# Patient Record
Sex: MALE | Race: WHITE | Employment: OTHER | URBAN - NONMETROPOLITAN AREA
[De-identification: names, ages, dates, MRNs, and addresses within clinical notes are randomized per-mention and may not be internally consistent; named-entity substitution may affect disease eponyms.]

---

## 2018-01-08 PROBLEM — E78.5 HYPERLIPIDEMIA: Status: ACTIVE | Noted: 2018-01-08

## 2018-01-19 ENCOUNTER — HOSPITAL ENCOUNTER (OUTPATIENT)
Age: 78
Discharge: HOME | End: 2018-01-19
Payer: MEDICARE

## 2018-01-19 LAB
ALT SERPL-CCNC: 17 U/L (ref 5–41)
AST SERPL-CCNC: 18 U/L
CHOLESTEROL/HDL RATIO: 3.7
CHOLESTEROL: 165 MG/DL
HDLC SERPL-MCNC: 45 MG/DL
LDL CHOLESTEROL: 97 MG/DL (ref 0–130)
PROSTATE SPECIFIC ANTIGEN: 5.97 UG/L
TRIGL SERPL-MCNC: 114 MG/DL
VLDLC SERPL CALC-MCNC: NORMAL MG/DL (ref 1–30)

## 2018-01-19 PROCEDURE — 36415 COLL VENOUS BLD VENIPUNCTURE: CPT

## 2018-01-19 PROCEDURE — G0103 PSA SCREENING: HCPCS

## 2018-01-19 PROCEDURE — 84460 ALANINE AMINO (ALT) (SGPT): CPT

## 2018-01-19 PROCEDURE — 84450 TRANSFERASE (AST) (SGOT): CPT

## 2018-01-19 PROCEDURE — 80061 LIPID PANEL: CPT

## 2018-01-23 ENCOUNTER — HOSPITAL ENCOUNTER (OUTPATIENT)
Dept: NON INVASIVE DIAGNOSTICS | Age: 78
Discharge: HOME | End: 2018-01-23
Payer: MEDICARE

## 2018-01-23 DIAGNOSIS — I49.9 IRREGULAR HEARTBEAT: ICD-10-CM

## 2018-01-23 PROCEDURE — 93225 XTRNL ECG REC<48 HRS REC: CPT

## 2018-02-13 ENCOUNTER — HOSPITAL ENCOUNTER (OUTPATIENT)
Dept: NON INVASIVE DIAGNOSTICS | Age: 78
Discharge: HOME | End: 2018-02-13
Payer: MEDICARE

## 2018-02-13 DIAGNOSIS — R94.31 ABNORMAL HOLTER MONITOR FINDING: ICD-10-CM

## 2018-02-13 DIAGNOSIS — I48.91 ATRIAL FIBRILLATION, UNSPECIFIED TYPE (HCC): ICD-10-CM

## 2018-02-13 LAB
LV EF: 58 %
LVEF MODALITY: NORMAL

## 2018-02-13 PROCEDURE — 3430000000 HC RX DIAGNOSTIC RADIOPHARMACEUTICAL: Performed by: INTERNAL MEDICINE

## 2018-02-13 PROCEDURE — 93017 CV STRESS TEST TRACING ONLY: CPT

## 2018-02-13 PROCEDURE — 78452 HT MUSCLE IMAGE SPECT MULT: CPT

## 2018-02-13 PROCEDURE — 6360000002 HC RX W HCPCS: Performed by: FAMILY MEDICINE

## 2018-02-13 PROCEDURE — A9500 TC99M SESTAMIBI: HCPCS | Performed by: INTERNAL MEDICINE

## 2018-02-13 PROCEDURE — 93306 TTE W/DOPPLER COMPLETE: CPT

## 2018-02-13 RX ADMIN — REGADENOSON 0.4 MG: 0.08 INJECTION, SOLUTION INTRAVENOUS at 09:33

## 2018-02-13 RX ADMIN — Medication 30 MILLICURIE: at 09:39

## 2018-02-14 ENCOUNTER — HOSPITAL ENCOUNTER (OUTPATIENT)
Dept: NON INVASIVE DIAGNOSTICS | Age: 78
Discharge: HOME | End: 2018-02-14
Payer: MEDICARE

## 2018-02-14 PROCEDURE — A9500 TC99M SESTAMIBI: HCPCS | Performed by: INTERNAL MEDICINE

## 2018-02-14 PROCEDURE — 3430000000 HC RX DIAGNOSTIC RADIOPHARMACEUTICAL: Performed by: INTERNAL MEDICINE

## 2018-02-14 RX ADMIN — Medication 30 MILLICURIE: at 12:24

## 2018-02-16 NOTE — PROCEDURES
[address] [address]                                CARDIAC STRESS TEST    PATIENT NAME: Rhina Perry                   :        1940  MED REC NO:   [id number]                              ROOM:  ACCOUNT NO:   [de-identified]                           ADMIT DATE: 2018  PROVIDER:     Fallon Ferguson    CARDIOVASCULAR DIAGNOSTIC DEPARTMENT    DATE OF STUDY:  2018    ORDERING PROVIDER:  Jaiden Luevano MD    PRIMARY CARE PROVIDER:  Jaiden Luevano MD    INTERPRETING PHYSICIAN:  Fallon Ferguson MD    PHARMACOLOGIC MYOCARDIAL PERFUSION STRESS TESTING    Stress/rest single isotope SPECT imaging with exercise stress and gated  SPECT imaging. INDICATIONS:  Assessment of a cardiac cause of:  Abnormal ECG    CLINICAL HISTORY:  The patient is a 68-year-old man with no known coronary  artery disease. Previous cardiac history includes:  None    Other previous history includes:  Chest pain    Symptoms just prior to testing include:  None    Relevant medications:  None    PROCEDURE:  The heart rate was 82 at baseline and tom to 117 beats per  minute during the regadenoson infusion. The rest blood pressure was 130/80  mm/Hg and increased to 144/80 mm/Hg. The patient did not complain of any  significant symptoms following infusion. Pharmacologic stress testing was performed with regadenoson at a dose of  0.4 mg. Additionally, low level exercise using slow treadmill walking was  performed along with vasodilator infusion. MYOCARDIAL PERFUSION IMAGING:  Imaging was performed at rest 30-45 minutes  following the injection of 30.0 mCi of sestamibi. Approximately 10 seconds  after Lexiscan injection, the patient was injected with 30.0 mCi of  sestamibi. Gating post-stress tomographic imaging was performed 30-45  minutes after stress.     STRESS ECG RESULTS:  The resting electrocardiogram demonstrated normal  sinus rhythm without significant ST-segment abnormalities that may impair  accurate ECG detection of stress induced cardiac ischemia. During vasodilator infusion and during recovery, the patient developed:    No significant ST segment changes suggestive of myocardial ischemia with  occasional premature atrial contractions (PACs) and occasional premature  ventricular contractions (PVCs). NUCLEAR IMAGING RESULTS:  The overall quality of the study is good. Moderate attenuation artifact was seen. There is no evidence of abnormal  lung uptake. Additionally, the right ventricle appears normal.  The left  ventricular cavity is noted to be normal in size on the stress images. There is no evidence of transient ischemic dilatation (TID) of the left  ventricle. Gated SPECT imaging reveals normal myocardial thickening and wall motion  with a calculated left ventricular ejection fraction of 58%. The rest images demonstrated a small perfusion abnormality of mild  intensity in the inferolateral region(s) which is most likely due to  artifact. On stress imaging, a small/moderate perfusion abnormality of mild/moderate  intensity in the inferolateral and inferior region(s) which may be due to  artifact. IMPRESSION:  1. Equivocal myocardial perfusion study. There is a small/moderate  perfusion defect of mild/moderate intensity in the inferolateral and  inferior region(s) during stress imaging which is most consistent with  ischemia but may be due to artifact. 2.  Global left ventricular systolic function was normal without regional  wall motion abnormalities. 3.  No significant electrocardiographic evidence of myocardial ischemia  during EKG monitoring without significant associated arrhythmias. Overall, these results are most consistent with a low risk for significant  coronary artery disease.     Depending on the patient symptoms and level of clinical suspicion,  aggressive medical management vs. additional testing by coronary  angiography may be indicated. Efraín Chaidez    D: 02/16/2018 5:51:48       T: 02/16/2018 5:54:27     [email or web address]  Job#: [id number]    Doc#: Unknown    CC:  [employer]. Para Goldberg

## 2018-02-28 ENCOUNTER — OFFICE VISIT (OUTPATIENT)
Dept: CARDIOLOGY | Age: 78
End: 2018-02-28
Payer: MEDICARE

## 2018-02-28 VITALS
HEIGHT: 74 IN | RESPIRATION RATE: 16 BRPM | WEIGHT: 207.2 LBS | BODY MASS INDEX: 26.59 KG/M2 | SYSTOLIC BLOOD PRESSURE: 135 MMHG | OXYGEN SATURATION: 95 % | DIASTOLIC BLOOD PRESSURE: 82 MMHG | HEART RATE: 60 BPM

## 2018-02-28 DIAGNOSIS — I49.3 FREQUENT PVCS: ICD-10-CM

## 2018-02-28 DIAGNOSIS — E78.2 MIXED HYPERLIPIDEMIA: ICD-10-CM

## 2018-02-28 DIAGNOSIS — I48.0 PAF (PAROXYSMAL ATRIAL FIBRILLATION) (HCC): Primary | ICD-10-CM

## 2018-02-28 DIAGNOSIS — R94.39 ABNORMAL STRESS TEST: ICD-10-CM

## 2018-02-28 PROCEDURE — 1036F TOBACCO NON-USER: CPT | Performed by: FAMILY MEDICINE

## 2018-02-28 PROCEDURE — 1123F ACP DISCUSS/DSCN MKR DOCD: CPT | Performed by: FAMILY MEDICINE

## 2018-02-28 PROCEDURE — G8484 FLU IMMUNIZE NO ADMIN: HCPCS | Performed by: FAMILY MEDICINE

## 2018-02-28 PROCEDURE — G8419 CALC BMI OUT NRM PARAM NOF/U: HCPCS | Performed by: FAMILY MEDICINE

## 2018-02-28 PROCEDURE — G8427 DOCREV CUR MEDS BY ELIG CLIN: HCPCS | Performed by: FAMILY MEDICINE

## 2018-02-28 PROCEDURE — 99215 OFFICE O/P EST HI 40 MIN: CPT | Performed by: FAMILY MEDICINE

## 2018-02-28 PROCEDURE — 4040F PNEUMOC VAC/ADMIN/RCVD: CPT | Performed by: FAMILY MEDICINE

## 2018-02-28 RX ORDER — B-COMPLEX WITH VITAMIN C
TABLET ORAL PRN
COMMUNITY
End: 2020-11-19

## 2018-02-28 NOTE — PROGRESS NOTES
inferolateral and inferior regions during stress imaging which is most consistent with ischemia but may be due to artifact. Overall, thses results are most consistent with a low risk for CAD    H/O echocardiogram 02/13/2018    EF 55-60%. The left ventiruclar wall thickness is mildly incrased. Aortic valve calcification without significant stenosis or regurgirtation. Evidence of mild (grade I) diastolic dysfucntion is seen.  History of Holter monitoring 01/29/2018    Atrial fibrillation for 51% of the test duration with heart rates ranging from 44 to 127 bpm with an average HR of 65 bpm. No symptoms were reported.  Hyperlipidemia     Other and unspecified hyperlipidemia     Psychosexual dysfunction with inhibited sexual excitement     Unspecified hyperplasia of prostate without urinary obstruction and other lower urinary tract symptoms (LUTS)        CURRENT ALLERGIES: Patient has no known allergies. REVIEW OF SYSTEMS: 14 systems were reviewed. Pertinent positives and negatives as above, all else negative. Past Surgical History:   Procedure Laterality Date    CYSTOURETHROSCOPY  1/12/05    HERNIA REPAIR      inguinal    PROSTATE SURGERY  1/5/11,1/12/05    TRUS-P    Social History:  Social History   Substance Use Topics    Smoking status: Former Smoker     Packs/day: 2.00     Years: 20.00     Quit date: 1/3/1977    Smokeless tobacco: Never Used    Alcohol use Yes        CURRENT MEDICATIONS:  No outpatient prescriptions have been marked as taking for the 2/28/18 encounter (Office Visit) with Keyla Acuna MD.       FAMILY HISTORY: family history includes Breast Cancer in his daughter and sister; Cancer in his son; Diabetes in his father; Heart Disease in his mother; Liver Cancer in his father.      PHYSICAL EXAM:   /82 (Site: Left Arm, Position: Sitting, Cuff Size: Medium Adult)   Pulse 60   Resp 16   Ht 6' 2\" (1.88 m)   Wt 207 lb 3.2 oz (94 kg)   SpO2 95%   BMI 26.60 kg/m²  Body mass index is 26.6 kg/m². Constitutional: He is oriented to person, place, and time. He appears well-developed and well-nourished. In no acute distress. HEENT: Normocephalic and atraumatic. No JVD present. Carotid bruit is not present. No mass and no thyromegaly present. No lymphadenopathy present. Cardiovascular: Normal rate, regular rhythm, normal heart sounds. Exam reveals no gallop and no friction rubs. A I/VI systolic murmur at the 2nd right intercostal space  Pulmonary/Chest: Effort normal and breath sounds normal. No respiratory distress. He has no wheezes, rhonchi or rales. Abdominal: Soft, non-tender. Bowel sounds and aorta are normal. He exhibits no organomegaly, mass or bruit. Extremities: No edema. No cyanosis and no clubbing. Pulses are 2+ radial and carotid pulses. 2+ dorsalis pedis and posterior tibial pulses bilaterally. Neurological: He is alert and oriented to person. No evidence of gross cranial nerve deficit. Coordination appeared normal.   Skin: Skin is warm and dry. There is no rash or diaphoresis. Psychiatric: He has a normal mood and affect. His speech is normal and behavior is normal.      MOST RECENT LABS ON RECORD:   Lab Results   Component Value Date    WBC 7.34 06/10/2016    HGB 14.1 06/10/2016    HCT 42.5 06/10/2016     06/10/2016    CHOL 165 01/19/2018    TRIG 114 01/19/2018    HDL 45 01/19/2018    ALT 17 01/19/2018    AST 18 01/19/2018     06/10/2016    K 4.5 06/10/2016     06/10/2016    CREATININE 1.00 06/10/2016    BUN 11 06/10/2016    CO2 27 06/10/2016    PSA 5.97 (H) 01/19/2018    LABA1C 5.7 06/10/2016       ASSESSMENT:  1. PAF (paroxysmal atrial fibrillation) ([facility] [address]    2. Abnormal stress test    3. Mixed hyperlipidemia    4.  Frequent PVCs       PLAN:  New Onset Paroxymal Atrial Fibrillation: Rate Control-Holter monitor: 51% of atrial fibrillation on 1/29/2018  Beta Blocker: Not indicated due to being asymptomatic with rate controlled at the current

## 2018-08-29 ENCOUNTER — OFFICE VISIT (OUTPATIENT)
Dept: CARDIOLOGY | Age: 78
End: 2018-08-29
Payer: MEDICARE

## 2018-08-29 VITALS
WEIGHT: 205.6 LBS | HEART RATE: 94 BPM | BODY MASS INDEX: 26.39 KG/M2 | HEIGHT: 74 IN | OXYGEN SATURATION: 95 % | SYSTOLIC BLOOD PRESSURE: 119 MMHG | DIASTOLIC BLOOD PRESSURE: 77 MMHG | RESPIRATION RATE: 16 BRPM

## 2018-08-29 DIAGNOSIS — E78.2 MIXED HYPERLIPIDEMIA: ICD-10-CM

## 2018-08-29 DIAGNOSIS — I49.3 FREQUENT PVCS: ICD-10-CM

## 2018-08-29 DIAGNOSIS — I48.0 PAF (PAROXYSMAL ATRIAL FIBRILLATION) (HCC): Primary | ICD-10-CM

## 2018-08-29 PROCEDURE — 1123F ACP DISCUSS/DSCN MKR DOCD: CPT | Performed by: FAMILY MEDICINE

## 2018-08-29 PROCEDURE — 93000 ELECTROCARDIOGRAM COMPLETE: CPT | Performed by: FAMILY MEDICINE

## 2018-08-29 PROCEDURE — G8427 DOCREV CUR MEDS BY ELIG CLIN: HCPCS | Performed by: FAMILY MEDICINE

## 2018-08-29 PROCEDURE — G8419 CALC BMI OUT NRM PARAM NOF/U: HCPCS | Performed by: FAMILY MEDICINE

## 2018-08-29 PROCEDURE — 1101F PT FALLS ASSESS-DOCD LE1/YR: CPT | Performed by: FAMILY MEDICINE

## 2018-08-29 PROCEDURE — 1036F TOBACCO NON-USER: CPT | Performed by: FAMILY MEDICINE

## 2018-08-29 PROCEDURE — 99213 OFFICE O/P EST LOW 20 MIN: CPT | Performed by: FAMILY MEDICINE

## 2018-08-29 PROCEDURE — 4040F PNEUMOC VAC/ADMIN/RCVD: CPT | Performed by: FAMILY MEDICINE

## 2018-08-29 RX ORDER — LOVASTATIN 40 MG/1
40 TABLET ORAL NIGHTLY
Qty: 90 TABLET | Refills: 3 | Status: SHIPPED | OUTPATIENT
Start: 2018-08-29 | End: 2019-03-22

## 2018-08-29 NOTE — PROGRESS NOTES
of the test duration with heart rates ranging from 44 to 127 bpm with an average HR of 65 bpm. No symptoms were reported.  Hyperlipidemia     Other and unspecified hyperlipidemia     Psychosexual dysfunction with inhibited sexual excitement     Unspecified hyperplasia of prostate without urinary obstruction and other lower urinary tract symptoms (LUTS)        CURRENT ALLERGIES: Patient has no known allergies. REVIEW OF SYSTEMS: 14 systems were reviewed. Pertinent positives and negatives as above, all else negative. Past Surgical History:   Procedure Laterality Date    CYSTOURETHROSCOPY  1/12/05    HERNIA REPAIR      inguinal    PROSTATE SURGERY  1/5/11,1/12/05    TRUS-P    Social History:  Social History   Substance Use Topics    Smoking status: Former Smoker     Packs/day: 2.00     Years: 20.00     Quit date: 1/3/1977    Smokeless tobacco: Never Used    Alcohol use Yes        CURRENT MEDICATIONS:  Outpatient Prescriptions Marked as Taking for the 8/29/18 encounter (Office Visit) with Jah Dumont MD   Medication Sig Dispense Refill    apixaban (ELIQUIS) 5 MG TABS tablet Take 1 tablet by mouth 2 times daily 180 tablet 3    Multiple Vitamins-Minerals (ICAPS AREDS 2 PO) Take by mouth      B Complex Vitamins (VITAMIN B COMPLEX) TABS Take by mouth as needed      finasteride (PROSCAR) 5 MG tablet Take 5 mg by mouth daily.  tamsulosin (FLOMAX) 0.4 MG capsule Take 0.4 mg by mouth daily. FAMILY HISTORY: family history includes Breast Cancer in his daughter and sister; Cancer in his son; Diabetes in his father; Heart Disease in his mother; Liver Cancer in his father. PHYSICAL EXAM:   /77 (Site: Right Arm, Position: Sitting, Cuff Size: Medium Adult)   Pulse 94   Resp 16   Ht 6' 2\" (1.88 m)   Wt 205 lb 9.6 oz (93.3 kg)   SpO2 95%   BMI 26.40 kg/m²  Body mass index is 26.4 kg/m². Constitutional: He is oriented to person, place, and time.  He appears well-developed and well-nourished. In no acute distress. HEENT: Normocephalic and atraumatic. No JVD present. Carotid bruit is not present. No mass and no thyromegaly present. No lymphadenopathy present. Cardiovascular: Normal rate, irregularly irregular rhythm, normal heart sounds. Exam reveals no gallop and no friction rubs. A I/VI systolic murmur at the 2nd right intercostal space  Pulmonary/Chest: Effort normal and breath sounds normal. No respiratory distress. He has no wheezes, rhonchi or rales. Abdominal: Soft, non-tender. Bowel sounds and aorta are normal. He exhibits no organomegaly, mass or bruit. Extremities: No edema. No cyanosis and no clubbing. Pulses are 2+ radial and carotid pulses. 2+ dorsalis pedis and posterior tibial pulses bilaterally. Neurological: He is alert and oriented to person. No evidence of gross cranial nerve deficit. Coordination appeared normal.   Skin: Skin is warm and dry. There is no rash or diaphoresis. Psychiatric: He has a normal mood and affect. His speech is normal and behavior is normal.      MOST RECENT LABS ON RECORD:   Lab Results   Component Value Date    WBC 8.32 05/14/2018    HGB 14.4 05/14/2018    HCT 43.3 05/14/2018     05/14/2018    CHOL 165 01/19/2018    TRIG 114 01/19/2018    HDL 55 05/14/2018    ALT 17 05/14/2018    ALT 17 05/14/2018    AST 22 05/14/2018    AST 22 05/14/2018    AST 22 05/14/2018     05/14/2018    K 4.4 05/14/2018     05/14/2018    CREATININE 1.00 05/14/2018    BUN 15 05/14/2018    CO2 26 05/14/2018    PSA 5.97 (H) 01/19/2018    GLUF 98 05/14/2018    LABA1C 5.7 06/10/2016       ASSESSMENT:  1. PAF (paroxysmal atrial fibrillation) ([facility] [address]    2. Frequent PVCs    3. Mixed hyperlipidemia       PLAN:  Paroxymal Atrial Fibrillation: Rate Control-Holter monitor: 51% of atrial fibrillation on 1/29/2018. Currently appears completely asymptomatic. Beta Blocker: Not indicated due to being asymptomatic with rate controlled at the current time.   Stroke

## 2018-09-21 PROBLEM — I48.0 PAROXYSMAL ATRIAL FIBRILLATION (HCC): Status: ACTIVE | Noted: 2018-09-21

## 2018-11-05 ENCOUNTER — HOSPITAL ENCOUNTER (OUTPATIENT)
Age: 78
Discharge: HOME | End: 2018-11-05
Payer: MEDICARE

## 2018-11-05 DIAGNOSIS — E78.2 MIXED HYPERLIPIDEMIA: ICD-10-CM

## 2018-11-05 DIAGNOSIS — E78.5 HYPERLIPIDEMIA, UNSPECIFIED HYPERLIPIDEMIA TYPE: ICD-10-CM

## 2018-11-05 DIAGNOSIS — Z12.5 SCREENING PSA (PROSTATE SPECIFIC ANTIGEN): ICD-10-CM

## 2018-11-05 LAB
ALT SERPL-CCNC: 17 U/L (ref 5–41)
CHOLESTEROL, FASTING: 186 MG/DL
CHOLESTEROL/HDL RATIO: 3.7
HDLC SERPL-MCNC: 50 MG/DL
LDL CHOLESTEROL: 117 MG/DL (ref 0–130)
PROSTATE SPECIFIC ANTIGEN: 4.99 UG/L
TRIGLYCERIDE, FASTING: 94 MG/DL
VLDLC SERPL CALC-MCNC: NORMAL MG/DL (ref 1–30)

## 2018-11-05 PROCEDURE — 36415 COLL VENOUS BLD VENIPUNCTURE: CPT

## 2018-11-05 PROCEDURE — 80061 LIPID PANEL: CPT

## 2018-11-05 PROCEDURE — G0103 PSA SCREENING: HCPCS

## 2018-11-05 PROCEDURE — 84460 ALANINE AMINO (ALT) (SGPT): CPT

## 2019-02-01 ENCOUNTER — HOSPITAL ENCOUNTER (OUTPATIENT)
Age: 79
Discharge: HOME | End: 2019-02-01
Payer: MEDICARE

## 2019-02-01 LAB
-: ABNORMAL
AMORPHOUS: ABNORMAL
BACTERIA: ABNORMAL
BILIRUBIN URINE: NEGATIVE
CASTS UA: ABNORMAL /LPF
COLOR: YELLOW
COMMENT UA: NORMAL
CRYSTALS, UA: ABNORMAL /HPF
EPITHELIAL CELLS UA: ABNORMAL /HPF (ref 0–5)
GLUCOSE URINE: NEGATIVE
KETONES, URINE: NEGATIVE
LEUKOCYTE ESTERASE, URINE: NEGATIVE
MUCUS: ABNORMAL
NITRITE, URINE: NEGATIVE
OTHER OBSERVATIONS UA: ABNORMAL
PH UA: 6.5 (ref 5–9)
PROSTATE SPECIFIC ANTIGEN: 5.28 UG/L
PROTEIN UA: NEGATIVE
RBC UA: ABNORMAL /HPF (ref 0–2)
RENAL EPITHELIAL, UA: ABNORMAL /HPF
SPECIFIC GRAVITY UA: 1.02 (ref 1.01–1.02)
TRICHOMONAS: ABNORMAL
TURBIDITY: CLEAR
URINE HGB: NEGATIVE
UROBILINOGEN, URINE: NORMAL
WBC UA: ABNORMAL /HPF (ref 0–5)
YEAST: ABNORMAL

## 2019-02-01 PROCEDURE — 81001 URINALYSIS AUTO W/SCOPE: CPT

## 2019-02-01 PROCEDURE — 84153 ASSAY OF PSA TOTAL: CPT

## 2019-02-01 PROCEDURE — 36415 COLL VENOUS BLD VENIPUNCTURE: CPT

## 2019-09-04 ENCOUNTER — OFFICE VISIT (OUTPATIENT)
Dept: CARDIOLOGY | Age: 79
End: 2019-09-04
Payer: MEDICARE

## 2019-09-04 VITALS
RESPIRATION RATE: 18 BRPM | DIASTOLIC BLOOD PRESSURE: 70 MMHG | HEIGHT: 74 IN | SYSTOLIC BLOOD PRESSURE: 122 MMHG | WEIGHT: 204 LBS | OXYGEN SATURATION: 94 % | BODY MASS INDEX: 26.18 KG/M2 | HEART RATE: 64 BPM

## 2019-09-04 DIAGNOSIS — E78.2 MIXED HYPERLIPIDEMIA: ICD-10-CM

## 2019-09-04 DIAGNOSIS — I48.0 PAROXYSMAL ATRIAL FIBRILLATION (HCC): Primary | ICD-10-CM

## 2019-09-04 PROCEDURE — 1123F ACP DISCUSS/DSCN MKR DOCD: CPT | Performed by: FAMILY MEDICINE

## 2019-09-04 PROCEDURE — G8427 DOCREV CUR MEDS BY ELIG CLIN: HCPCS | Performed by: FAMILY MEDICINE

## 2019-09-04 PROCEDURE — 1036F TOBACCO NON-USER: CPT | Performed by: FAMILY MEDICINE

## 2019-09-04 PROCEDURE — 93000 ELECTROCARDIOGRAM COMPLETE: CPT | Performed by: FAMILY MEDICINE

## 2019-09-04 PROCEDURE — 4040F PNEUMOC VAC/ADMIN/RCVD: CPT | Performed by: FAMILY MEDICINE

## 2019-09-04 PROCEDURE — G8419 CALC BMI OUT NRM PARAM NOF/U: HCPCS | Performed by: FAMILY MEDICINE

## 2019-09-04 PROCEDURE — 99213 OFFICE O/P EST LOW 20 MIN: CPT | Performed by: FAMILY MEDICINE

## 2019-09-04 NOTE — PROGRESS NOTES
Equivocal myocardial perfusion study. There is a small/moderate perfusion defect of mild/moderate intensity in the inferolateral and inferior regions during stress imaging which is most consistent with ischemia but may be due to artifact. Overall, thses results are most consistent with a low risk for CAD    H/O echocardiogram 2018    EF 55-60%. The left ventiruclar wall thickness is mildly incrased. Aortic valve calcification without significant stenosis or regurgirtation. Evidence of mild (grade I) diastolic dysfucntion is seen.  History of Holter monitoring 2018    Atrial fibrillation for 51% of the test duration with heart rates ranging from 44 to 127 bpm with an average HR of 65 bpm. No symptoms were reported.  Hyperlipidemia     Other and unspecified hyperlipidemia     Psychosexual dysfunction with inhibited sexual excitement     Unspecified hyperplasia of prostate without urinary obstruction and other lower urinary tract symptoms (LUTS)        CURRENT ALLERGIES: Patient has no known allergies. REVIEW OF SYSTEMS: 14 systems were reviewed. Pertinent positives and negatives as above, all else negative. Past Surgical History:   Procedure Laterality Date    CYSTOURETHROSCOPY  05    HERNIA REPAIR      inguinal    PROSTATE SURGERY  11,05    TRUS-P    Social History:  Social History     Tobacco Use    Smoking status: Former Smoker     Packs/day: 2.00     Years: 20.00     Pack years: 40.00     Last attempt to quit: 1/3/1977     Years since quittin.6    Smokeless tobacco: Never Used   Substance Use Topics    Alcohol use:  Yes    Drug use: Not on file        CURRENT MEDICATIONS:  Outpatient Medications Marked as Taking for the 19 encounter (Office Visit) with Arjun Hurtado MD   Medication Sig Dispense Refill    simvastatin (ZOCOR) 20 MG tablet Take 1 tablet by mouth every evening 90 tablet 3    finasteride (PROSCAR) 5 MG tablet Take 5 mg by mouth daily     

## 2020-01-23 ENCOUNTER — TELEPHONE (OUTPATIENT)
Dept: CARDIOLOGY | Age: 80
End: 2020-01-23

## 2020-10-07 ENCOUNTER — OFFICE VISIT (OUTPATIENT)
Dept: CARDIOLOGY | Age: 80
End: 2020-10-07
Payer: MEDICARE

## 2020-10-07 ENCOUNTER — HOSPITAL ENCOUNTER (OUTPATIENT)
Age: 80
Discharge: HOME | End: 2020-10-07
Payer: MEDICARE

## 2020-10-07 ENCOUNTER — HOSPITAL ENCOUNTER (OUTPATIENT)
Dept: NON INVASIVE DIAGNOSTICS | Age: 80
Discharge: HOME | End: 2020-10-07
Payer: MEDICARE

## 2020-10-07 VITALS
OXYGEN SATURATION: 96 % | DIASTOLIC BLOOD PRESSURE: 79 MMHG | HEIGHT: 74 IN | SYSTOLIC BLOOD PRESSURE: 134 MMHG | HEART RATE: 54 BPM | BODY MASS INDEX: 27.34 KG/M2 | RESPIRATION RATE: 18 BRPM | WEIGHT: 213 LBS

## 2020-10-07 PROBLEM — R00.1 SEVERE SINUS BRADYCARDIA: Status: ACTIVE | Noted: 2020-10-07

## 2020-10-07 LAB
HCT VFR BLD CALC: 43.8 % (ref 40.7–50.3)
HEMOGLOBIN: 14.1 G/DL (ref 13–17)
MCH RBC QN AUTO: 31.3 PG (ref 25.2–33.5)
MCHC RBC AUTO-ENTMCNC: 32.2 G/DL (ref 28.4–34.8)
MCV RBC AUTO: 97.3 FL (ref 82.6–102.9)
NRBC AUTOMATED: 0 PER 100 WBC
PDW BLD-RTO: 13.1 % (ref 11.8–14.4)
PLATELET # BLD: 263 K/UL (ref 138–453)
PMV BLD AUTO: 8.4 FL (ref 8.1–13.5)
RBC # BLD: 4.5 M/UL (ref 4.21–5.77)
WBC # BLD: 7.8 K/UL (ref 3.5–11.3)

## 2020-10-07 PROCEDURE — G8417 CALC BMI ABV UP PARAM F/U: HCPCS | Performed by: FAMILY MEDICINE

## 2020-10-07 PROCEDURE — G8484 FLU IMMUNIZE NO ADMIN: HCPCS | Performed by: FAMILY MEDICINE

## 2020-10-07 PROCEDURE — 93226 XTRNL ECG REC<48 HR SCAN A/R: CPT

## 2020-10-07 PROCEDURE — G8427 DOCREV CUR MEDS BY ELIG CLIN: HCPCS | Performed by: FAMILY MEDICINE

## 2020-10-07 PROCEDURE — 93225 XTRNL ECG REC<48 HRS REC: CPT

## 2020-10-07 PROCEDURE — 85027 COMPLETE CBC AUTOMATED: CPT

## 2020-10-07 PROCEDURE — 1123F ACP DISCUSS/DSCN MKR DOCD: CPT | Performed by: FAMILY MEDICINE

## 2020-10-07 PROCEDURE — 93010 ELECTROCARDIOGRAM REPORT: CPT | Performed by: FAMILY MEDICINE

## 2020-10-07 PROCEDURE — 1036F TOBACCO NON-USER: CPT | Performed by: FAMILY MEDICINE

## 2020-10-07 PROCEDURE — 99211 OFF/OP EST MAY X REQ PHY/QHP: CPT | Performed by: FAMILY MEDICINE

## 2020-10-07 PROCEDURE — 99214 OFFICE O/P EST MOD 30 MIN: CPT | Performed by: FAMILY MEDICINE

## 2020-10-07 PROCEDURE — 93005 ELECTROCARDIOGRAM TRACING: CPT | Performed by: FAMILY MEDICINE

## 2020-10-07 PROCEDURE — 36415 COLL VENOUS BLD VENIPUNCTURE: CPT

## 2020-10-07 PROCEDURE — 4040F PNEUMOC VAC/ADMIN/RCVD: CPT | Performed by: FAMILY MEDICINE

## 2020-10-07 NOTE — PATIENT INSTRUCTIONS
SURVEY:    You may be receiving a survey from [employer] regarding your visit today. Please complete the survey to enable us to provide the highest quality of care to you and your family. If you cannot score us a very good on any question, please call the office to discuss how we could have made your experience a very good one. Thank you. Eron Mckeon was your MA today!

## 2020-10-07 NOTE — PROGRESS NOTES
Jose Maria Espinoza am scribing for and in the presence of Samir Kingsley. Citlalli DONIS, MS, F.A.C.C. Patient: Judy Swift  : 1940  Date of Visit: 2020    REASON FOR VISIT / CONSULTATION: Follow-up (Hx:PAF,HLD. Pt states he is doing just fine. C/o: Palpitaitos on occashion. Denies: CP, Lighteaded/dizziness, SOB.)      Dear Barry Bales MD,    I had the pleasure of seeing Judy Swift today. Mr. Jayla Crowe is a 78 y.o. male with a new onset of atrial fibrillation that had shown on a Holter monitor of 51% of atrial fibrillation. He also had an echocardiogram that had shown an ejection fraction of 55% and a cardiovascular stress test that had shown small-moderate perfusion defect of mild/moderate intensity in the inferolateral and inferior region during stress imaging which is most consistent with ischemia. However, because of his lack of symptoms we decided to opt for a guideline maximal medical management approach. Mr. Jayla Crowe says he is doing well. He just came back from [place] and has a great time down there. He does get some mild lightheaded and dizziness with standing up to quickly however no falls or near falls. He denied any current or recent chest pain, abdominal pain, bleeding problems, problems with his medications or any other concerns at this time. Exercise Tolerance: Mr. Jayla Crowe reports that he has an excellent exercise tolerance. His says that he could thinks he could walk 4-5 miles without developing chest discomfort or significant shortness of breath. He reports occasional lightheadedness and dizziness but mostly just if he gets up to quick and denies any falls or near falls. Bleeding Risks: Mr. Jayla Crowe denies any current or recent bleeding problems including a history of a GI bleed, ulcers, recent or upcoming surgeries, blood in his stool or black tarry stools or blood in his urine.     Past Medical History:   Diagnosis Date    Enlarged prostate     H/O cardiovascular stress test 2018    Equivocal myocardial perfusion study. There is a small/moderate perfusion defect of mild/moderate intensity in the inferolateral and inferior regions during stress imaging which is most consistent with ischemia but may be due to artifact. Overall, thses results are most consistent with a low risk for CAD    H/O echocardiogram 2018    EF 55-60%. The left ventiruclar wall thickness is mildly incrased. Aortic valve calcification without significant stenosis or regurgirtation. Evidence of mild (grade I) diastolic dysfucntion is seen.  History of Holter monitoring 2018    Atrial fibrillation for 51% of the test duration with heart rates ranging from 44 to 127 bpm with an average HR of 65 bpm. No symptoms were reported.  Hyperlipidemia     Other and unspecified hyperlipidemia     Psychosexual dysfunction with inhibited sexual excitement     Unspecified hyperplasia of prostate without urinary obstruction and other lower urinary tract symptoms (LUTS)        CURRENT ALLERGIES: Patient has no known allergies. REVIEW OF SYSTEMS: 14 systems were reviewed. Pertinent positives and negatives as above, all else negative. Past Surgical History:   Procedure Laterality Date    CYSTOURETHROSCOPY  05    HERNIA REPAIR      inguinal    PROSTATE SURGERY  11,05    TRUS-P    Social History:  Social History     Tobacco Use    Smoking status: Former Smoker     Packs/day: 2.00     Years: 20.00     Pack years: 40.00     Last attempt to quit: 1/3/1977     Years since quittin.7    Smokeless tobacco: Never Used   Substance Use Topics    Alcohol use:  Yes    Drug use: Not on file        CURRENT MEDICATIONS:  Outpatient Medications Marked as Taking for the 10/7/20 encounter (Office Visit) with Mona Griffith MD   Medication Sig Dispense Refill    simvastatin (ZOCOR) 20 MG tablet Take 1 tablet by mouth every evening 90 tablet 3    apixaban (ELIQUIS) 5 MG TABS tablet Take 1 tablet by mouth 2 times daily 180 tablet 3    finasteride (PROSCAR) 5 MG tablet Take 5 mg by mouth daily      Multiple Vitamins-Minerals (ICAPS AREDS 2 PO) Take by mouth      B Complex Vitamins (VITAMIN B COMPLEX) TABS Take by mouth as needed      tamsulosin (FLOMAX) 0.4 MG capsule Take 0.4 mg by mouth daily. FAMILY HISTORY: family history includes Breast Cancer in his daughter and sister; Cancer in his son; Diabetes in his father; Heart Disease in his mother; Liver Cancer in his father. PHYSICAL EXAM:   BP (!) 144/74 (Site: Right Upper Arm, Position: Sitting, Cuff Size: Medium Adult)   Pulse 57   Resp 18   Ht 6' 2\" (1.88 m)   Wt 213 lb (96.6 kg)   SpO2 96%   BMI 27.35 kg/m²  Body mass index is 27.35 kg/m². Constitutional: He is oriented to person, place, and time. He appears well-developed and well-nourished. In no acute distress. HEENT: Normocephalic and atraumatic. No JVD present. Carotid bruit is not present. No mass and no thyromegaly present. No lymphadenopathy present. Cardiovascular: Normal rate, regular rhythm, normal heart sounds. Exam reveals no gallop and no friction rubs. A II/VI systolic murmur at the myocardial apex  Pulmonary/Chest: Effort normal and breath sounds normal. No respiratory distress. He has no wheezes, rhonchi or rales. Abdominal: Soft, non-tender. Bowel sounds and aorta are normal. He exhibits no organomegaly, mass or bruit. Extremities: No edema. No cyanosis and no clubbing. Pulses are 2+ radial and carotid pulses. 2+ dorsalis pedis and posterior tibial pulses bilaterally. Neurological: He is alert and oriented to person. No evidence of gross cranial nerve deficit. Coordination appeared normal.   Skin: Skin is warm and dry. There is no rash or diaphoresis. Psychiatric: He has a normal mood and affect.  His speech is normal and behavior is normal.      MOST RECENT LABS ON RECORD:   Lab Results   Component Value Date    WBC 8.16 05/15/2019    HGB 14.3 05/15/2019    HCT 44.7 05/15/2019     05/15/2019    CHOL 165 01/19/2018    TRIG 114 01/19/2018    HDL 54 05/15/2019    ALT 18 05/15/2019    ALT 18 05/15/2019    AST 26 05/15/2019    AST 26 05/15/2019     05/15/2019    K 4.6 05/15/2019     05/15/2019    CREATININE 0.95 05/15/2019    BUN 21 05/15/2019    CO2 23 05/15/2019    PSA 5.28 (H) 02/01/2019    GLUF 98 05/14/2018    LABA1C 5.7 06/10/2016       ASSESSMENT:  1. Severe sinus bradycardia    2. Paroxysmal atrial fibrillation (HCC)    3. Bradycardia    4. Essential hypertension    5. Mixed hyperlipidemia       PLAN:  Paroxymal Atrial Fibrillation: Rate Control: Holter monitor: 51% of atrial fibrillation on 1/29/2018. Appears to remain completely asymptomatic. Beta Blocker: Not indicated due to being asymptomatic with rate controlled at the current time. [id number] Score for Atrial Fibrillation Stroke Risk   Risk   Factors  Component Value   C CHF No 0   H HTN No 0   A2 Age >= 76 Yes,  (78 y.o.) 2   D DM No 0   S2 Prior Stroke/TIA No 0   V Vascular Disease No 0   A Age 74-69 No,  (78 y.o.) 0   Sc Sex male 0    [id number]  Score  2   Score last updated 09/3/41 31:78 PM EDT  Click here for a link to the UpToDate guideline \"Atrial Fibrillation: Anticoagulation therapy to prevent embolization  Disclaimer: Risk Score calculation is dependent on accuracy of patient problem list and past encounter diagnosis. Stroke Risk: His CHADS2-VASc score is 2 (2.2% stroke risk)   Anticoagulation: Continue Apixaban (Eliquis) 5 mg every 12 hours. I also reminded him to watch for signs of blood in his stool or black tarry stools and stop the medication immediately if this develops as this could be life threatening. Because of this, I also took the liberty of ordering a Complete Blood Count (CBC) now to assess his Hgb and WBC count. 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  However at this time he appears to most

## 2020-10-08 ENCOUNTER — TELEPHONE (OUTPATIENT)
Dept: CARDIOLOGY | Age: 80
End: 2020-10-08

## 2020-10-08 NOTE — TELEPHONE ENCOUNTER
----- Message from Marta Richards MD sent at 10/7/2020 11:49 PM EDT -----  Let Mr. Nielsonwyn Chacho know their test result was ok. Thanks.

## 2020-10-19 LAB
ACQUISITION DURATION: NORMAL S
AVERAGE HEART RATE: 60 BPM
EKG DIAGNOSIS: NORMAL
FASTEST SUPRAVENTRICULAR RATE: 138 BPM
HOLTER MAX HEART RATE: 109 BPM
HOOKUP DATE: NORMAL
HOOKUP TIME: NORMAL
LONGEST SUPRAVENTRICULAR RATE: 87 BPM
MAX HEART RATE TIME/DATE: NORMAL
MIN HEART RATE TIME/DATE: NORMAL
MIN HEART RATE: 43 BPM
NUMBER OF FASTEST SUPRAVENTRICULAR BEATS: 9
NUMBER OF LONGEST SUPRAVENTRICULAR BEATS: 19
NUMBER OF QRS COMPLEXES: NORMAL
NUMBER OF SUPRAVENTRICULAR BEATS IN RUNS: 207
NUMBER OF SUPRAVENTRICULAR COUPLETS: 374
NUMBER OF SUPRAVENTRICULAR ECTOPICS: 4947
NUMBER OF SUPRAVENTRICULAR ISOLATED BEATS: 3986
NUMBER OF SUPRAVENTRICULAR RUNS: 56
NUMBER OF VENTRICULAR BEATS IN RUNS: 0
NUMBER OF VENTRICULAR BIGEMINAL CYCLES: 28
NUMBER OF VENTRICULAR COUPLETS: 1
NUMBER OF VENTRICULAR ECTOPICS: 239
NUMBER OF VENTRICULAR ISOLATED BEATS: 237
NUMBER OF VENTRICULAR RUNS: 0

## 2020-10-20 ENCOUNTER — TELEPHONE (OUTPATIENT)
Dept: CARDIOLOGY | Age: 80
End: 2020-10-20

## 2020-10-20 NOTE — TELEPHONE ENCOUNTER
----- Message from Maday Owens MD sent at 10/19/2020 10:42 PM EDT -----  Let Mr. Yanezia Doctor know their test result was ok. Thanks.

## 2020-11-24 ENCOUNTER — HOSPITAL ENCOUNTER (OUTPATIENT)
Age: 80
Discharge: HOME | End: 2020-11-24
Payer: MEDICARE

## 2020-11-24 LAB
ALBUMIN SERPL-MCNC: 4.1 G/DL (ref 3.5–5.2)
ALBUMIN/GLOBULIN RATIO: 1.4 (ref 1–2.5)
ALP BLD-CCNC: 82 U/L (ref 40–129)
ALT SERPL-CCNC: 14 U/L (ref 5–41)
ANION GAP SERPL CALCULATED.3IONS-SCNC: 10 MMOL/L (ref 9–17)
AST SERPL-CCNC: 18 U/L
BILIRUB SERPL-MCNC: 0.33 MG/DL (ref 0.3–1.2)
BUN BLDV-MCNC: 15 MG/DL (ref 8–23)
BUN/CREAT BLD: 17 (ref 9–20)
CALCIUM SERPL-MCNC: 9.3 MG/DL (ref 8.6–10.4)
CHLORIDE BLD-SCNC: 106 MMOL/L (ref 98–107)
CHOLESTEROL/HDL RATIO: 3.9
CHOLESTEROL: 182 MG/DL
CO2: 26 MMOL/L (ref 20–31)
CREAT SERPL-MCNC: 0.87 MG/DL (ref 0.7–1.2)
ESTIMATED AVERAGE GLUCOSE: 120 MG/DL
GFR AFRICAN AMERICAN: >60 ML/MIN
GFR NON-AFRICAN AMERICAN: >60 ML/MIN
GFR SERPL CREATININE-BSD FRML MDRD: ABNORMAL ML/MIN/{1.73_M2}
GFR SERPL CREATININE-BSD FRML MDRD: ABNORMAL ML/MIN/{1.73_M2}
GLUCOSE BLD-MCNC: 101 MG/DL (ref 70–99)
HBA1C MFR BLD: 5.8 % (ref 4–6)
HCT VFR BLD CALC: 45.8 % (ref 40.7–50.3)
HDLC SERPL-MCNC: 47 MG/DL
HEMOGLOBIN: 14.8 G/DL (ref 13–17)
LDL CHOLESTEROL: 115 MG/DL (ref 0–130)
MCH RBC QN AUTO: 31.4 PG (ref 25.2–33.5)
MCHC RBC AUTO-ENTMCNC: 32.3 G/DL (ref 28.4–34.8)
MCV RBC AUTO: 97.2 FL (ref 82.6–102.9)
NRBC AUTOMATED: 0 PER 100 WBC
PDW BLD-RTO: 12.7 % (ref 11.8–14.4)
PLATELET # BLD: 255 K/UL (ref 138–453)
PMV BLD AUTO: 8.2 FL (ref 8.1–13.5)
POTASSIUM SERPL-SCNC: 4.6 MMOL/L (ref 3.7–5.3)
PROSTATE SPECIFIC ANTIGEN: 6.81 UG/L
RBC # BLD: 4.71 M/UL (ref 4.21–5.77)
SODIUM BLD-SCNC: 142 MMOL/L (ref 135–144)
TOTAL PROTEIN: 7.1 G/DL (ref 6.4–8.3)
TRIGL SERPL-MCNC: 101 MG/DL
VLDLC SERPL CALC-MCNC: NORMAL MG/DL (ref 1–30)
WBC # BLD: 7.7 K/UL (ref 3.5–11.3)

## 2020-11-24 PROCEDURE — 36415 COLL VENOUS BLD VENIPUNCTURE: CPT

## 2020-11-24 PROCEDURE — 80061 LIPID PANEL: CPT

## 2020-11-24 PROCEDURE — 83036 HEMOGLOBIN GLYCOSYLATED A1C: CPT

## 2020-11-24 PROCEDURE — G0103 PSA SCREENING: HCPCS

## 2020-11-24 PROCEDURE — 85027 COMPLETE CBC AUTOMATED: CPT

## 2020-11-24 PROCEDURE — 80053 COMPREHEN METABOLIC PANEL: CPT

## 2021-05-06 ENCOUNTER — HOSPITAL ENCOUNTER (OUTPATIENT)
Age: 81
Discharge: HOME | End: 2021-05-06
Payer: MEDICARE

## 2021-05-06 DIAGNOSIS — I48.0 PAROXYSMAL ATRIAL FIBRILLATION (HCC): ICD-10-CM

## 2021-05-06 LAB
ALBUMIN SERPL-MCNC: 3.9 G/DL (ref 3.5–5.2)
ALBUMIN/GLOBULIN RATIO: 1.3 (ref 1–2.5)
ALP BLD-CCNC: 76 U/L (ref 40–129)
ALT SERPL-CCNC: 14 U/L (ref 5–41)
ANION GAP SERPL CALCULATED.3IONS-SCNC: 6 MMOL/L (ref 9–17)
AST SERPL-CCNC: 20 U/L
BILIRUB SERPL-MCNC: 0.41 MG/DL (ref 0.3–1.2)
BUN BLDV-MCNC: 14 MG/DL (ref 8–23)
BUN/CREAT BLD: 16 (ref 9–20)
CALCIUM SERPL-MCNC: 9.4 MG/DL (ref 8.6–10.4)
CHLORIDE BLD-SCNC: 105 MMOL/L (ref 98–107)
CO2: 27 MMOL/L (ref 20–31)
CREAT SERPL-MCNC: 0.89 MG/DL (ref 0.7–1.2)
GFR AFRICAN AMERICAN: >60 ML/MIN
GFR NON-AFRICAN AMERICAN: >60 ML/MIN
GFR SERPL CREATININE-BSD FRML MDRD: ABNORMAL ML/MIN/{1.73_M2}
GFR SERPL CREATININE-BSD FRML MDRD: ABNORMAL ML/MIN/{1.73_M2}
GLUCOSE BLD-MCNC: 98 MG/DL (ref 70–99)
POTASSIUM SERPL-SCNC: 4.1 MMOL/L (ref 3.7–5.3)
SODIUM BLD-SCNC: 138 MMOL/L (ref 135–144)
TOTAL PROTEIN: 6.9 G/DL (ref 6.4–8.3)

## 2021-05-06 PROCEDURE — 80053 COMPREHEN METABOLIC PANEL: CPT

## 2021-05-06 PROCEDURE — 36415 COLL VENOUS BLD VENIPUNCTURE: CPT

## 2021-10-13 ENCOUNTER — OFFICE VISIT (OUTPATIENT)
Dept: CARDIOLOGY | Age: 81
End: 2021-10-13
Payer: MEDICARE

## 2021-10-13 VITALS
DIASTOLIC BLOOD PRESSURE: 73 MMHG | WEIGHT: 205 LBS | HEART RATE: 63 BPM | RESPIRATION RATE: 18 BRPM | OXYGEN SATURATION: 97 % | BODY MASS INDEX: 26.31 KG/M2 | HEIGHT: 74 IN | SYSTOLIC BLOOD PRESSURE: 124 MMHG

## 2021-10-13 DIAGNOSIS — I48.0 PAROXYSMAL ATRIAL FIBRILLATION (HCC): Primary | ICD-10-CM

## 2021-10-13 DIAGNOSIS — I51.7 LVH (LEFT VENTRICULAR HYPERTROPHY): ICD-10-CM

## 2021-10-13 DIAGNOSIS — R00.1 BRADYCARDIA: ICD-10-CM

## 2021-10-13 DIAGNOSIS — E78.2 MIXED HYPERLIPIDEMIA: ICD-10-CM

## 2021-10-13 DIAGNOSIS — I10 ESSENTIAL HYPERTENSION: ICD-10-CM

## 2021-10-13 PROCEDURE — 1123F ACP DISCUSS/DSCN MKR DOCD: CPT | Performed by: FAMILY MEDICINE

## 2021-10-13 PROCEDURE — G8417 CALC BMI ABV UP PARAM F/U: HCPCS | Performed by: FAMILY MEDICINE

## 2021-10-13 PROCEDURE — 93005 ELECTROCARDIOGRAM TRACING: CPT | Performed by: FAMILY MEDICINE

## 2021-10-13 PROCEDURE — 4040F PNEUMOC VAC/ADMIN/RCVD: CPT | Performed by: FAMILY MEDICINE

## 2021-10-13 PROCEDURE — G8427 DOCREV CUR MEDS BY ELIG CLIN: HCPCS | Performed by: FAMILY MEDICINE

## 2021-10-13 PROCEDURE — 93010 ELECTROCARDIOGRAM REPORT: CPT | Performed by: FAMILY MEDICINE

## 2021-10-13 PROCEDURE — 99211 OFF/OP EST MAY X REQ PHY/QHP: CPT | Performed by: FAMILY MEDICINE

## 2021-10-13 PROCEDURE — 99214 OFFICE O/P EST MOD 30 MIN: CPT | Performed by: FAMILY MEDICINE

## 2021-10-13 PROCEDURE — G8484 FLU IMMUNIZE NO ADMIN: HCPCS | Performed by: FAMILY MEDICINE

## 2021-10-13 PROCEDURE — 1036F TOBACCO NON-USER: CPT | Performed by: FAMILY MEDICINE

## 2021-10-13 NOTE — PATIENT INSTRUCTIONS
SURVEY:    You may be receiving a survey from [employer] regarding your visit today. Please complete the survey to enable us to provide the highest quality of care to you and your family. If you cannot score us a very good on any question, please call the office to discuss how we could have made your experience a very good one. Thank you.

## 2021-10-13 NOTE — PROGRESS NOTES
Supriya Malin am scribing for and in the presence of Nazia Ortiz. Citlalli DONIS, MS, F.A.C.C. Patient: Vinnie Waldrop  : 1940  Date of Visit: 2021    REASON FOR VISIT / CONSULTATION: Follow-up (Hx: nico, PAF, HTN, HLD. pt is here for 1 year f/u. doing well. denies: CP, sob, dizziness, lightheaded, palps)      Dear ROSIE Sapp CNP,    I had the pleasure of seeing Vinnie Waldrop today. Mr. Fabian Teresa is a 78 y.o. male with a new onset of atrial fibrillation that had shown on a Holter monitor of 51% of atrial fibrillation. He also had an echocardiogram that had shown an ejection fraction of 55% and a cardiovascular stress test that had shown small-moderate perfusion defect of mild/moderate intensity in the inferolateral and inferior region during stress imaging which is most consistent with ischemia. However, because of his lack of symptoms we decided to opt for a guideline maximal medical management approach. Mr. Fabian Teresa says he is doing well. He said he checks his pulse and it does skip beats sometimes. Overall he reports doing well. He denies any significant chest pain or chest pressure and says he works out regularly but denies any significant chest pain or chest pressure. He reports being fairly active but says he may have a bit more shortness of breath with exertion. He denied any current or recent chest pain, abdominal pain, bleeding problems, problems with his medications or any other concerns at this time. He denies any dizziness or lightheadedness. He denies any palpitations. He is eating and drinking and okay. Bleeding Risks: Mr. Fabian Teresa denies any current or recent bleeding problems including a history of a GI bleed, ulcers, recent or upcoming surgeries, blood in his stool or black tarry stools or blood in his urine.     Past Medical History:   Diagnosis Date    Bradycardia     Enlarged prostate     H/O cardiovascular stress test 2018 Equivocal myocardial perfusion study. There is a small/moderate perfusion defect of mild/moderate intensity in the inferolateral and inferior regions during stress imaging which is most consistent with ischemia but may be due to artifact. Overall, thses results are most consistent with a low risk for CAD    H/O echocardiogram 2018    EF 55-60%. The left ventiruclar wall thickness is mildly incrased. Aortic valve calcification without significant stenosis or regurgirtation. Evidence of mild (grade I) diastolic dysfucntion is seen.  History of Holter monitoring 2018    Atrial fibrillation for 51% of the test duration with heart rates ranging from 44 to 127 bpm with an average HR of 65 bpm. No symptoms were reported.  Hyperlipidemia     Other and unspecified hyperlipidemia     PAF (paroxysmal atrial fibrillation) (HCC)     Psychosexual dysfunction with inhibited sexual excitement     Unspecified hyperplasia of prostate without urinary obstruction and other lower urinary tract symptoms (LUTS)        CURRENT ALLERGIES: Patient has no known allergies. REVIEW OF SYSTEMS: 14 systems were reviewed. Pertinent positives and negatives as above, all else negative. Past Surgical History:   Procedure Laterality Date    CYSTOURETHROSCOPY  05    HERNIA REPAIR      inguinal    PROSTATE SURGERY  11,05    TRUS-P    Social History:  Social History     Tobacco Use    Smoking status: Former Smoker     Packs/day: 2.00     Years: 20.00     Pack years: 40.00     Quit date: 1/3/1977     Years since quittin.8    Smokeless tobacco: Never Used   Substance Use Topics    Alcohol use:  Yes    Drug use: Not on file        CURRENT MEDICATIONS:  Outpatient Medications Marked as Taking for the 10/13/21 encounter (Office Visit) with Fabby Yancey MD   Medication Sig Dispense Refill    ELIQUIS 5 MG TABS tablet TAKE 1 TABLET BY MOUTH TWICE DAILY 180 tablet 3    simvastatin (ZOCOR) 20 MG tablet Take 1 tablet by mouth every evening 90 tablet 3    finasteride (PROSCAR) 5 MG tablet Take 5 mg by mouth daily      Multiple Vitamins-Minerals (ICAPS AREDS 2 PO) Take by mouth      tamsulosin (FLOMAX) 0.4 MG capsule Take 0.4 mg by mouth daily. FAMILY HISTORY: family history includes Breast Cancer in his daughter and sister; Cancer in his son; Diabetes in his father; Heart Disease in his mother; Liver Cancer in his father. PHYSICAL EXAM:   /73 (Site: Left Upper Arm, Position: Sitting, Cuff Size: Medium Adult)   Pulse 63   Resp 18   Ht 6' 2.02\" (1.88 m)   Wt 205 lb (93 kg)   SpO2 97%   BMI 26.31 kg/m²  Body mass index is 26.31 kg/m². Constitutional: He is oriented to person, place, and time. He appears well-developed and well-nourished. In no acute distress. HEENT: Normocephalic and atraumatic. No JVD present. Carotid bruit is not present. No mass and no thyromegaly present. No lymphadenopathy present. Cardiovascular: Normal rate, regular rhythm, normal heart sounds. Exam reveals no gallop and no friction rubs. A II/VI systolic murmur at the myocardial apex  Pulmonary/Chest: Effort normal and breath sounds normal. No respiratory distress. He has no wheezes, rhonchi or rales. Abdominal: Soft, non-tender. Bowel sounds and aorta are normal. He exhibits no organomegaly, mass or bruit. Extremities: No edema. No cyanosis and no clubbing. Pulses are 2+ radial and carotid pulses. 2+ dorsalis pedis and posterior tibial pulses bilaterally. Neurological: He is alert and oriented to person. No evidence of gross cranial nerve deficit. Coordination appeared normal.   Skin: Skin is warm and dry. There is no rash or diaphoresis. Psychiatric: He has a normal mood and affect.  His speech is normal and behavior is normal.      MOST RECENT LABS ON RECORD:   Lab Results   Component Value Date    WBC 7.7 11/24/2020    HGB 14.8 11/24/2020    HCT 45.8 11/24/2020     11/24/2020    CHOL 182 11/24/2020 TRIG 101 11/24/2020    HDL 47 11/24/2020    ALT 14 05/06/2021    AST 20 05/06/2021     05/06/2021    K 4.1 05/06/2021     05/06/2021    CREATININE 0.89 05/06/2021    BUN 14 05/06/2021    CO2 27 05/06/2021    PSA 6.81 (H) 11/24/2020    GLUF 98 05/14/2018    LABA1C 5.8 11/24/2020       ASSESSMENT:  1. Paroxysmal atrial fibrillation (HCC)    2. Bradycardia    3. Essential hypertension    4. Mixed hyperlipidemia    5. LVH (left ventricular hypertrophy)       PLAN:    · Increased shortness of breath with exertion: may be age related  · I ordered an echocardiogram due to his shortness of breath to assess his myocardial structure and function. Paroxymal Atrial Fibrillation: Rate Control: Holter monitor: 51% of atrial fibrillation on 1/29/2018. Appears to remain completely asymptomatic. Beta Blocker: Not indicated due to being asymptomatic with rate controlled at the current time. [id number] Score for Atrial Fibrillation Stroke Risk   Risk   Factors  Component Value   C CHF No 0   H HTN Yes 1   A2 Age >= 76 Yes,  (78 y.o.) 2   D DM No 0   S2 Prior Stroke/TIA No 0   V Vascular Disease No 0   A Age 74-69 No,  (78 y.o.) 0   Sc Sex male 0    [id number]  Score  3   Score last updated 05/4/75 54:47 PM EDT  Click here for a link to the UpToDate guideline \"Atrial Fibrillation: Anticoagulation therapy to prevent embolization  Disclaimer: Risk Score calculation is dependent on accuracy of patient problem list and past encounter diagnosis. In regards to anticoagulation. Mr. Yaa Layne currently has a CHADS2-VASc score of 3/9 with an approximately 3.2% risk of stroke per year. Anticoagulation: Continue Apixaban (Eliquis) 5 mg every 12 hours. I also reminded him to watch for signs of blood in his stool or black tarry stools and stop the medication immediately if this develops as this could be life threatening.      History of Bradycardia: Asymptomatic, as above we did discuss this and we will continue to monitor this for now.   Essential Hypertension: Controlled   ·  Additional Testing List: I ordered a echocardiogram to better assess for the etiology of this problem and to help guide future management    · Hyperlipidemia: Mixed LDL: 115 on 11/24/2020   · Cholesterol Reduction Therapy: Continue simvastatin (Zocor) 20 mg daily. In the meantime, I encouraged Mr. Shamir Phillips to continue to take his other medications. FOLLOW UP:   I told Mr. Shamir Phillips to call my office if he had any problems, but otherwise I asked him to Return in about 1 year (around 10/13/2022). However, I would be happy to see him sooner should the need arise. Sincerely,  Kristopher Renteria. Citlalli DONIS, MS, F.A.C.C. [facility] Cardiology Specialist    [address] [place] [address]  Phone: [phone], Fax: [phone]     I believe that the risk of significant morbidity and mortality related to the patient's current medical conditions are: Intermediate. The documentation recorded by the scribe, accurately and completely reflects the services I personally performed and the decisions made by me. Radha Rodriguez MD, MS, F.A.C.C.  October 13, 2021

## 2021-10-29 ENCOUNTER — HOSPITAL ENCOUNTER (OUTPATIENT)
Dept: NON INVASIVE DIAGNOSTICS | Age: 81
Discharge: HOME | End: 2021-10-29
Payer: MEDICARE

## 2021-10-29 DIAGNOSIS — I48.0 PAROXYSMAL ATRIAL FIBRILLATION (HCC): ICD-10-CM

## 2021-10-29 DIAGNOSIS — I10 ESSENTIAL HYPERTENSION: ICD-10-CM

## 2021-10-29 DIAGNOSIS — E78.2 MIXED HYPERLIPIDEMIA: ICD-10-CM

## 2021-10-29 DIAGNOSIS — R00.1 BRADYCARDIA: ICD-10-CM

## 2021-10-29 LAB
LV EF: 55 %
LVEF MODALITY: NORMAL

## 2021-10-29 PROCEDURE — 93306 TTE W/DOPPLER COMPLETE: CPT

## 2021-11-02 ENCOUNTER — TELEPHONE (OUTPATIENT)
Dept: CARDIOLOGY | Age: 81
End: 2021-11-02

## 2021-11-02 NOTE — TELEPHONE ENCOUNTER
----- Message from Amrita Yung MD sent at 11/1/2021 10:26 PM EDT -----  Let Mr. Fabian Teresa know their test result was ok. Will discuss at next visit. Thanks.

## 2021-12-20 DIAGNOSIS — I48.0 PAROXYSMAL ATRIAL FIBRILLATION (HCC): Primary | ICD-10-CM

## 2022-01-28 ENCOUNTER — TELEPHONE (OUTPATIENT)
Dept: CARDIOLOGY | Age: 82
End: 2022-01-28

## 2022-01-28 NOTE — TELEPHONE ENCOUNTER
That is fine. I would do something like the . Jamee dot phrase with the correct dose. Thanks. Because of Mr. Sheehan's elevated CHADS2-VASc2, I am moderately concerned about his longterm risk of stroke. Therefore I discussed with him at length the risks, benefits and alternatives to treating with anticoagulation including no treatment, aspirin therapy, Warfarin, Pradaxa, Xarelto, Savysa and Eliquis. I explained that although any treatments would increase his bleeding risk which unfortunately occasionally can be fatal, treated with anticoagulation would likely cut her ischemic stroke risk in half. After discussing the risks and benefits of each of the medications we ultimately settled on Eliquis 5 mg b.i.d. I instructed him to watch for any signs of blood in his stool, urine or black tarry stools and if this developed to stop the medication immediately and call my or your office and/or go to nearest emergency room. Mr. Mariel Valencia verbalized understanding.

## 2022-01-28 NOTE — TELEPHONE ENCOUNTER
Mr. Batsheva Mario is requesting a letter be written listing why he needs to be on Eliquis and why this is beneficial so that we can fax his prescription & this letter to the [place]. He reports the [place] told him if we could provide this, they would send him Eliquis. What would you like me to include in letter?

## 2022-10-10 ENCOUNTER — OFFICE VISIT (OUTPATIENT)
Dept: CARDIOLOGY | Age: 82
End: 2022-10-10
Payer: MEDICARE

## 2022-10-10 VITALS
OXYGEN SATURATION: 96 % | WEIGHT: 198.2 LBS | BODY MASS INDEX: 25.44 KG/M2 | HEART RATE: 56 BPM | SYSTOLIC BLOOD PRESSURE: 118 MMHG | HEIGHT: 74 IN | DIASTOLIC BLOOD PRESSURE: 64 MMHG | RESPIRATION RATE: 18 BRPM

## 2022-10-10 DIAGNOSIS — Z79.01 ENCOUNTER FOR CURRENT LONG-TERM USE OF ANTICOAGULANTS: ICD-10-CM

## 2022-10-10 DIAGNOSIS — I48.0 PAROXYSMAL ATRIAL FIBRILLATION (HCC): Primary | ICD-10-CM

## 2022-10-10 DIAGNOSIS — R00.1 BRADYCARDIA: ICD-10-CM

## 2022-10-10 DIAGNOSIS — E78.2 MIXED HYPERLIPIDEMIA: ICD-10-CM

## 2022-10-10 DIAGNOSIS — I10 ESSENTIAL HYPERTENSION: ICD-10-CM

## 2022-10-10 PROCEDURE — G8417 CALC BMI ABV UP PARAM F/U: HCPCS | Performed by: FAMILY MEDICINE

## 2022-10-10 PROCEDURE — 93010 ELECTROCARDIOGRAM REPORT: CPT | Performed by: FAMILY MEDICINE

## 2022-10-10 PROCEDURE — 99213 OFFICE O/P EST LOW 20 MIN: CPT | Performed by: FAMILY MEDICINE

## 2022-10-10 PROCEDURE — G8484 FLU IMMUNIZE NO ADMIN: HCPCS | Performed by: FAMILY MEDICINE

## 2022-10-10 PROCEDURE — 1123F ACP DISCUSS/DSCN MKR DOCD: CPT | Performed by: FAMILY MEDICINE

## 2022-10-10 PROCEDURE — 1036F TOBACCO NON-USER: CPT | Performed by: FAMILY MEDICINE

## 2022-10-10 PROCEDURE — G8427 DOCREV CUR MEDS BY ELIG CLIN: HCPCS | Performed by: FAMILY MEDICINE

## 2022-10-10 PROCEDURE — 99211 OFF/OP EST MAY X REQ PHY/QHP: CPT | Performed by: FAMILY MEDICINE

## 2022-10-10 PROCEDURE — 93005 ELECTROCARDIOGRAM TRACING: CPT | Performed by: FAMILY MEDICINE

## 2022-10-10 NOTE — PROGRESS NOTES
Winsome Lyons am scribing for and in the presence of Halley Frances. Citlalli DONIS, MS, F.A.C.C. Patient: Lalita Lyon  : 1940  Date of Visit: October 10, 2022    REASON FOR VISIT / CONSULTATION: Follow-up (Hx: PAF, HTN, HLD, hx of nico. Pt is here for 1 year f/u. Denies: CP, Sob, dizziness, lightheaded, palps)    Dear ROSIE Montano - CNP,    I had the pleasure of seeing Lalita Lyon today. Mr. Caden Sood is a 78 y.o. male with a new onset of atrial fibrillation that had shown on a Holter monitor of 51% of atrial fibrillation. He also had an echocardiogram that had shown an ejection fraction of 55% and a cardiovascular stress test that had shown small-moderate perfusion defect of mild/moderate intensity in the inferolateral and inferior region during stress imaging which is most consistent with ischemia. However, because of his lack of symptoms we decided to opt for a guideline maximal medical management approach. Echo done on 10/29/2021 showed EF 55%, Mild mitral regurgitation. Mr. Caden Sood is here today for his yearly follow up. He has been doing well since his lat visit. He said after 70 years he is officially retiring from [employer]. He is going to [facility] for a two week vacation. He only had one episode of dizziness from standing too fast. He has noticed going in and out of atrial fibrillation only by feeling his pulse. He denies any shortness of breath with exertion. He denied any current or recent chest pain, abdominal pain, bleeding problems, problems with his medications or any other concerns at this time. He denies any recurrent dizziness or lightheadedness. He denies any palpitations. Bleeding Risks: Mr. Caden Sood denies any current or recent bleeding problems including a history of a GI bleed, ulcers, recent or upcoming surgeries, blood in his stool or black tarry stools or blood in his urine.      Exercise Tolerance: Mr. Caden Sood reports that he has a fairly good exercise tolerance. He says that he could walk 1 mile without developing chest discomfort or significant shortness of breath. Past Medical History:   Diagnosis Date    Bradycardia     Enlarged prostate     H/O cardiovascular stress test 2018    Equivocal myocardial perfusion study. There is a small/moderate perfusion defect of mild/moderate intensity in the inferolateral and inferior regions during stress imaging which is most consistent with ischemia but may be due to artifact. Overall, thses results are most consistent with a low risk for CAD    H/O echocardiogram 2018    EF 55-60%. The left ventiruclar wall thickness is mildly incrased. Aortic valve calcification without significant stenosis or regurgirtation. Evidence of mild (grade I) diastolic dysfucntion is seen. History of Holter monitoring 2018    Atrial fibrillation for 51% of the test duration with heart rates ranging from 44 to 127 bpm with an average HR of 65 bpm. No symptoms were reported. Hyperlipidemia     Other and unspecified hyperlipidemia     PAF (paroxysmal atrial fibrillation) (HCC)     Psychosexual dysfunction with inhibited sexual excitement     Unspecified hyperplasia of prostate without urinary obstruction and other lower urinary tract symptoms (LUTS)        CURRENT ALLERGIES: Patient has no known allergies. REVIEW OF SYSTEMS: 14 systems were reviewed. Pertinent positives and negatives as above, all else negative.      Past Surgical History:   Procedure Laterality Date    CYSTOURETHROSCOPY  05    HERNIA REPAIR      inguinal    PROSTATE SURGERY  11,05    TRUS-P    Social History:  Social History     Tobacco Use    Smoking status: Former     Packs/day: 2.00     Years: 20.00     Pack years: 40.00     Types: Cigarettes     Quit date: 1/3/1977     Years since quittin.7    Smokeless tobacco: Never   Substance Use Topics    Alcohol use: Yes        CURRENT MEDICATIONS:  Outpatient Medications Marked as Taking for the 10/10/22 encounter (Office Visit) with Lupis Mendieta MD   Medication Sig Dispense Refill    ELIQUIS 5 MG TABS tablet TAKE 1 TABLET BY MOUTH TWICE DAILY 180 tablet 3    simvastatin (ZOCOR) 20 MG tablet Take 1 tablet by mouth every evening 90 tablet 3    finasteride (PROSCAR) 5 MG tablet Take 5 mg by mouth daily      Multiple Vitamins-Minerals (ICAPS AREDS 2 PO) Take by mouth      tamsulosin (FLOMAX) 0.4 MG capsule Take 0.4 mg by mouth daily. FAMILY HISTORY: family history includes Breast Cancer in his daughter and sister; Cancer in his son; Diabetes in his father; Heart Disease in his mother; Liver Cancer in his father. PHYSICAL EXAM:   /64 (Site: Left Upper Arm, Position: Sitting, Cuff Size: Medium Adult)   Pulse 56   Resp 18   Ht 6' 2.02\" (1.88 m)   Wt 198 lb 3.2 oz (89.9 kg)   SpO2 96%   BMI 25.44 kg/m²  Body mass index is 25.44 kg/m². Constitutional: He is oriented to person, place, and time. He appears well-developed and well-nourished. In no acute distress. HEENT: Normocephalic and atraumatic. No JVD present. Carotid bruit is not present. No mass and no thyromegaly present. No lymphadenopathy present. Cardiovascular: Normal rate, regular rhythm, normal heart sounds. Exam reveals no gallop and no friction rubs. A IV/VI systolic murmur at the 2nd right intercostal space  Pulmonary/Chest: Effort normal and breath sounds normal. No respiratory distress. He has no wheezes, rhonchi or rales. Abdominal: Soft, non-tender. Bowel sounds and aorta are normal. He exhibits no organomegaly, mass or bruit. Extremities: No edema. No cyanosis and no clubbing. Pulses are 2+ radial and carotid pulses. 2+ dorsalis pedis and posterior tibial pulses bilaterally. Neurological: He is alert and oriented to person. No evidence of gross cranial nerve deficit. Coordination appeared normal.   Skin: Skin is warm and dry. There is no rash or diaphoresis.    Psychiatric: He has a normal mood and affect. His speech is normal and behavior is normal.      MOST RECENT LABS ON RECORD:   Lab Results   Component Value Date    WBC 7.25 11/15/2021    HGB 14.6 11/15/2021    HCT 43.7 11/15/2021     11/15/2021    CHOL 172 11/15/2021    TRIG 90 11/15/2021    HDL 39 11/15/2021    ALT 16 11/15/2021    AST 17 11/15/2021     11/15/2021    K 4.4 11/15/2021     11/15/2021    CREATININE 0.96 11/15/2021    BUN 12 11/15/2021    CO2 25 11/15/2021    TSH 2.4013 11/15/2021    PSA 6.81 (H) 11/24/2020    GLUF 99 11/15/2021    LABA1C 5.8 11/24/2020       ASSESSMENT:  1. Paroxysmal atrial fibrillation (HCC)    2. Bradycardia    3. Essential hypertension    4. Mixed hyperlipidemia    5. Encounter for current long-term use of anticoagulants       PLAN:  Paroxymal Atrial Fibrillation: Rate Control: Holter monitor: 51% of atrial fibrillation on 1/29/2018. Appears to remain completely asymptomatic. EKG today shows sinus bradycardia  Beta Blocker: Not indicated due to being asymptomatic with rate controlled at the current time. [id number] Score for Atrial Fibrillation Stroke Risk   Risk   Factors  Component Value   C CHF No 0   H HTN Yes 1   A2 Age >= 76 Yes,  (78 y.o.) 2   D DM No 0   S2 Prior Stroke/TIA No 0   V Vascular Disease No 0   A Age 74-69 No,  (78 y.o.) 0   Sc Sex male 0    [id number]  Score  3   Score last updated 28/7/04 59:64 PM EDT  Click here for a link to the UpToDate guideline \"Atrial Fibrillation: Anticoagulation therapy to prevent embolization  Disclaimer: Risk Score calculation is dependent on accuracy of patient problem list and past encounter diagnosis. In regards to anticoagulation. Mr. Laron Barthel currently has a CHADS2-VASc score of 3/9 with an approximately 3.2% risk of stroke per year. Anticoagulation: Continue Apixaban (Eliquis) 5 mg every 12 hours.  I also reminded him to watch for signs of blood in his stool or black tarry stools and stop the medication immediately if this develops as this could be life threatening. History of Bradycardia: Asymptomatic, We will continue to monitor this for now. Essential Hypertension: Controlled     Hyperlipidemia: Mixed LDL: 115 on 11/15/2021  Cholesterol Reduction Therapy: Continue simvastatin (Zocor) 20 mg daily. We will review his blood work when he brings a copy to our office. In the meantime, I encouraged Mr. Kathie Hernandez to continue to take his other medications. FOLLOW UP:   I told Mr. Kathie Hernandez to call my office if he had any problems, but otherwise I asked him to Return in about 1 year (around 10/10/2023). However, I would be happy to see him sooner should the need arise. Sincerely,  Sun Pacheco. Citlalli DONIS, MS, F.A.C.C. [facility] Cardiology Specialist    [address] [place] [address]  Phone: [phone], Fax: [phone]     I believe that the risk of significant morbidity and mortality related to the patient's current medical conditions are: Intermediate. The documentation recorded by the scribe, accurately and completely reflects the services I personally performed and the decisions made by me. Cecilio Edwards MD, MS, F.A.C.C.  October 10, 2022

## 2023-10-25 ENCOUNTER — OFFICE VISIT (OUTPATIENT)
Dept: CARDIOLOGY | Age: 83
End: 2023-10-25
Payer: MEDICARE

## 2023-10-25 VITALS
HEIGHT: 74 IN | RESPIRATION RATE: 16 BRPM | SYSTOLIC BLOOD PRESSURE: 151 MMHG | HEART RATE: 63 BPM | WEIGHT: 203.8 LBS | DIASTOLIC BLOOD PRESSURE: 74 MMHG | OXYGEN SATURATION: 95 % | BODY MASS INDEX: 26.15 KG/M2

## 2023-10-25 DIAGNOSIS — Z87.898 HISTORY OF BRADYCARDIA: ICD-10-CM

## 2023-10-25 DIAGNOSIS — E78.2 MIXED HYPERLIPIDEMIA: ICD-10-CM

## 2023-10-25 DIAGNOSIS — I10 PRIMARY HYPERTENSION: ICD-10-CM

## 2023-10-25 DIAGNOSIS — I38 DIASTOLIC MURMUR: ICD-10-CM

## 2023-10-25 DIAGNOSIS — I48.0 PAF (PAROXYSMAL ATRIAL FIBRILLATION) (HCC): Primary | ICD-10-CM

## 2023-10-25 DIAGNOSIS — Z79.01 CHRONIC ANTICOAGULATION: ICD-10-CM

## 2023-10-25 PROCEDURE — 99214 OFFICE O/P EST MOD 30 MIN: CPT | Performed by: FAMILY MEDICINE

## 2023-10-25 PROCEDURE — 93010 ELECTROCARDIOGRAM REPORT: CPT | Performed by: FAMILY MEDICINE

## 2023-10-25 PROCEDURE — 3077F SYST BP >= 140 MM HG: CPT | Performed by: FAMILY MEDICINE

## 2023-10-25 PROCEDURE — 3078F DIAST BP <80 MM HG: CPT | Performed by: FAMILY MEDICINE

## 2023-10-25 PROCEDURE — G8484 FLU IMMUNIZE NO ADMIN: HCPCS | Performed by: FAMILY MEDICINE

## 2023-10-25 PROCEDURE — 93005 ELECTROCARDIOGRAM TRACING: CPT | Performed by: FAMILY MEDICINE

## 2023-10-25 PROCEDURE — G8417 CALC BMI ABV UP PARAM F/U: HCPCS | Performed by: FAMILY MEDICINE

## 2023-10-25 PROCEDURE — G8427 DOCREV CUR MEDS BY ELIG CLIN: HCPCS | Performed by: FAMILY MEDICINE

## 2023-10-25 PROCEDURE — 1036F TOBACCO NON-USER: CPT | Performed by: FAMILY MEDICINE

## 2023-10-25 PROCEDURE — 1123F ACP DISCUSS/DSCN MKR DOCD: CPT | Performed by: FAMILY MEDICINE

## 2023-10-25 RX ORDER — LOSARTAN POTASSIUM 25 MG/1
25 TABLET ORAL DAILY
Qty: 90 TABLET | Refills: 3 | Status: SHIPPED | OUTPATIENT
Start: 2023-10-25

## 2023-10-25 NOTE — PATIENT INSTRUCTIONS
SURVEY:    You may be receiving a survey from [facility] regarding your visit today. Please complete the survey to enable us to provide the highest quality of care to you and your family. If you cannot score us a very good on any question, please call the office to discuss how we could have made your experience a very good one. Thank you.

## 2023-10-25 NOTE — PROGRESS NOTES
Mikayla Tinsley am scribing for and in the presence of Angeles Serrano. Citlalli DONIS, MS, F.A.C.C. Patient: Jeffery Chilel  : 1940  Date of Visit: 2023    REASON FOR VISIT / CONSULTATION: Atrial Fibrillation (Hx: PAF, HTN, HLD, Hx of [place]lan. Yearly follow up. //He has been doing well. Palpitations when he sleeping/laying down - very rare. //Denies: CP, SOB, Lightheaded/dizziness. )    Dear Devante Escalante, APRN - CNP,    I had the pleasure of seeing Jeffery Chilel today. Mr. Felicitas Villareal is a 78 y.o. male with a new onset of atrial fibrillation that had shown on a Holter monitor of 51% of atrial fibrillation. He also had an echocardiogram that had shown an ejection fraction of 55% and a cardiovascular stress test that had shown small-moderate perfusion defect of mild/moderate intensity in the inferolateral and inferior region during stress imaging which is most consistent with ischemia. However, because of his lack of symptoms we decided to opt for a guideline maximal medical management approach. Echo done on 10/29/2021 showed EF 55%, Mild mitral regurgitation. Mr. Felicitas Villareal is here today for his yearly follow up. He has been doing well since his lat visit. He reports he does feel some rare palpitations when laying down/sleeping at night. He has officially been retired for 1 year from [employer] after working there for 70 years. He denies having any chest pain, pressure or tightness. He denies any shortness of breath with exertion or lightheaded/dizziness. No abdominal pain, bleeding problems, bowel issues, problems with his medications or any other concerns at this time. No cough, fever or chills. No nausea or vomiting. Bleeding Risks: Mr. Felicitas Villareal denies any current or recent bleeding problems including a history of a GI bleed, ulcers, recent or upcoming surgeries, blood in his stool or black tarry stools or blood in his urine.      Exercise Tolerance: Mr. Felicitas Villareal reports that

## 2023-11-10 ENCOUNTER — NURSE ONLY (OUTPATIENT)
Dept: CARDIOLOGY | Age: 83
End: 2023-11-10

## 2023-11-10 ENCOUNTER — TELEPHONE (OUTPATIENT)
Dept: CARDIOLOGY | Age: 83
End: 2023-11-10

## 2023-11-10 VITALS — SYSTOLIC BLOOD PRESSURE: 131 MMHG | DIASTOLIC BLOOD PRESSURE: 74 MMHG | HEART RATE: 58 BPM

## 2023-11-10 NOTE — TELEPHONE ENCOUNTER
Mr. Shari Turner came into the office today for a nurse visit BP check. Per our last note from 10/25 we did start him on Losartan due to his higher readings when here. His nurse visit today showed mildly elevated readings as well, however his home BP readings are fairly well controlled. For example 032-652 mmHg Systolic number and HR in the 60's. We did compare his home cuff with our cuff today as well and it was fairly comparable so I do believe his home readings. He reported today that he DID NOT START the Losartan due to his home BP readings being controlled. His last Echo from 2021 showed no LVH. He does have an Echo scheduled to be done on 11/17 a week from today to reassess this. I let him know that not taking this medication is ultimately his choice however I also told him that I would let you know and get your opinion as well. Please advise. Thanks!

## 2023-11-10 NOTE — PROGRESS NOTES
BP check to compare his home BP cuff to our machine. His readings were fairly controlled. He has an Echo scheduled for next Friday. We can then assess his LVH at that time. His last Echo from 2021 showed no LVH.

## 2023-11-11 NOTE — TELEPHONE ENCOUNTER
Yes it is up to him but I do think that his risk of stroke is unnecessarily elevated and that starting this low dose losartan would reduce that risk but it is up to him. The issue is not that his BP is not ever controlled but frequently uncontrolled. Thanks.

## 2023-11-17 ENCOUNTER — HOSPITAL ENCOUNTER (OUTPATIENT)
Age: 83
End: 2023-11-17
Attending: FAMILY MEDICINE
Payer: MEDICARE

## 2023-11-17 VITALS
WEIGHT: 203.71 LBS | BODY MASS INDEX: 26.14 KG/M2 | SYSTOLIC BLOOD PRESSURE: 131 MMHG | HEIGHT: 74 IN | DIASTOLIC BLOOD PRESSURE: 74 MMHG

## 2023-11-17 DIAGNOSIS — Z87.898 HISTORY OF BRADYCARDIA: ICD-10-CM

## 2023-11-17 DIAGNOSIS — Z79.01 CHRONIC ANTICOAGULATION: ICD-10-CM

## 2023-11-17 DIAGNOSIS — I48.0 PAF (PAROXYSMAL ATRIAL FIBRILLATION) (HCC): ICD-10-CM

## 2023-11-17 DIAGNOSIS — E78.2 MIXED HYPERLIPIDEMIA: ICD-10-CM

## 2023-11-17 DIAGNOSIS — I10 PRIMARY HYPERTENSION: ICD-10-CM

## 2023-11-17 DIAGNOSIS — I38 DIASTOLIC MURMUR: ICD-10-CM

## 2023-11-17 LAB
ECHO AO ROOT DIAM: 3.8 CM
ECHO AO ROOT INDEX: 1.74 CM/M2
ECHO AV ACCELERATION TIME: 60.78 MS
ECHO AV CUSP MM: 1.5 CM
ECHO AV MEAN GRADIENT: 7 MMHG
ECHO AV MEAN VELOCITY: 1.2 M/S
ECHO AV PEAK VELOCITY: 1.7 M/S
ECHO AV VTI: 33 CM
ECHO BSA: 2.2 M2
ECHO EST RA PRESSURE: 3 MMHG
ECHO LA DIAMETER INDEX: 1.87 CM/M2
ECHO LA DIAMETER: 4.1 CM
ECHO LA MAJOR AXIS: 5 CM
ECHO LA TO AORTIC ROOT RATIO: 1.08
ECHO LA VOL BP: 53 ML (ref 18–58)
ECHO LA VOL MOD A2C: 71 ML (ref 18–58)
ECHO LA VOL MOD A4C: 35 ML (ref 18–58)
ECHO LA VOL/BSA BIPLANE: 24 ML/M2 (ref 16–34)
ECHO LA VOLUME AREA LENGTH: 60 ML
ECHO LA VOLUME INDEX AREA LENGTH: 27 ML/M2 (ref 16–34)
ECHO LA VOLUME INDEX MOD A2C: 32 ML/M2 (ref 16–34)
ECHO LA VOLUME INDEX MOD A4C: 16 ML/M2 (ref 16–34)
ECHO LV E' LATERAL VELOCITY: 6 CM/S
ECHO LV EDV A2C: 95 ML
ECHO LV EDV A4C: 91 ML
ECHO LV EDV BP: 93 ML (ref 67–155)
ECHO LV EDV INDEX A4C: 42 ML/M2
ECHO LV EDV INDEX BP: 42 ML/M2
ECHO LV EDV NDEX A2C: 43 ML/M2
ECHO LV EJECTION FRACTION BIPLANE: 75 % (ref 55–100)
ECHO LV ESV A2C: 27 ML
ECHO LV ESV A4C: 21 ML
ECHO LV ESV BP: 23 ML (ref 22–58)
ECHO LV ESV INDEX A2C: 12 ML/M2
ECHO LV ESV INDEX A4C: 10 ML/M2
ECHO LV ESV INDEX BP: 11 ML/M2
ECHO LV FRACTIONAL SHORTENING: 31 % (ref 28–44)
ECHO LV INTERNAL DIMENSION DIASTOLE INDEX: 2.05 CM/M2
ECHO LV INTERNAL DIMENSION DIASTOLIC: 4.5 CM (ref 4.2–5.9)
ECHO LV INTERNAL DIMENSION SYSTOLIC INDEX: 1.42 CM/M2
ECHO LV INTERNAL DIMENSION SYSTOLIC: 3.1 CM
ECHO LV IVSD: 1.2 CM (ref 0.6–1)
ECHO LV IVSS: 1.4 CM
ECHO LV MASS 2D: 210.2 G (ref 88–224)
ECHO LV MASS INDEX 2D: 96 G/M2 (ref 49–115)
ECHO LV POSTERIOR WALL DIASTOLIC: 1.3 CM (ref 0.6–1)
ECHO LV POSTERIOR WALL SYSTOLIC: 1.4 CM
ECHO LV RELATIVE WALL THICKNESS RATIO: 0.58
ECHO MV A VELOCITY: 0.56 M/S
ECHO MV E DECELERATION TIME (DT): 294.7 MS
ECHO MV E VELOCITY: 0.63 M/S
ECHO MV E/A RATIO: 1.13
ECHO MV E/E' LATERAL: 10.5
ECHO PV MAX VELOCITY: 0.8 M/S
ECHO RIGHT VENTRICULAR SYSTOLIC PRESSURE (RVSP): 20 MMHG
ECHO TV REGURGITANT MAX VELOCITY: 2.08 M/S

## 2023-11-17 PROCEDURE — 93306 TTE W/DOPPLER COMPLETE: CPT

## 2023-11-17 PROCEDURE — 93306 TTE W/DOPPLER COMPLETE: CPT | Performed by: FAMILY MEDICINE

## 2023-11-20 ENCOUNTER — TELEPHONE (OUTPATIENT)
Dept: CARDIOLOGY | Age: 83
End: 2023-11-20

## 2023-11-20 NOTE — TELEPHONE ENCOUNTER
----- Message from Rob Gordon MD sent at 11/17/2023  6:21 PM EST -----  Let Mr. Brett Chairez know their test result was ok. Will discuss at next visit. Thanks.

## 2024-11-20 ENCOUNTER — OFFICE VISIT (OUTPATIENT)
Dept: CARDIOLOGY | Age: 84
End: 2024-11-20
Payer: MEDICARE

## 2024-11-20 VITALS
HEIGHT: 74 IN | DIASTOLIC BLOOD PRESSURE: 65 MMHG | HEART RATE: 66 BPM | BODY MASS INDEX: 26.69 KG/M2 | OXYGEN SATURATION: 93 % | RESPIRATION RATE: 18 BRPM | WEIGHT: 208 LBS | SYSTOLIC BLOOD PRESSURE: 136 MMHG

## 2024-11-20 DIAGNOSIS — E78.2 MIXED HYPERLIPIDEMIA: ICD-10-CM

## 2024-11-20 DIAGNOSIS — D68.69 SECONDARY HYPERCOAGULABILITY DISORDER (HCC): ICD-10-CM

## 2024-11-20 DIAGNOSIS — I48.0 PAF (PAROXYSMAL ATRIAL FIBRILLATION) (HCC): Primary | ICD-10-CM

## 2024-11-20 DIAGNOSIS — I38 DIASTOLIC MURMUR: ICD-10-CM

## 2024-11-20 DIAGNOSIS — Z79.01 ENCOUNTER FOR CURRENT LONG-TERM USE OF ANTICOAGULANTS: ICD-10-CM

## 2024-11-20 DIAGNOSIS — Z87.898 HISTORY OF BRADYCARDIA: ICD-10-CM

## 2024-11-20 DIAGNOSIS — I10 PRIMARY HYPERTENSION: ICD-10-CM

## 2024-11-20 DIAGNOSIS — Z79.01 CHRONIC ANTICOAGULATION: ICD-10-CM

## 2024-11-20 PROCEDURE — G8427 DOCREV CUR MEDS BY ELIG CLIN: HCPCS | Performed by: FAMILY MEDICINE

## 2024-11-20 PROCEDURE — 93010 ELECTROCARDIOGRAM REPORT: CPT | Performed by: FAMILY MEDICINE

## 2024-11-20 PROCEDURE — 99211 OFF/OP EST MAY X REQ PHY/QHP: CPT | Performed by: FAMILY MEDICINE

## 2024-11-20 PROCEDURE — 93005 ELECTROCARDIOGRAM TRACING: CPT | Performed by: FAMILY MEDICINE

## 2024-11-20 PROCEDURE — 1123F ACP DISCUSS/DSCN MKR DOCD: CPT | Performed by: FAMILY MEDICINE

## 2024-11-20 PROCEDURE — G8417 CALC BMI ABV UP PARAM F/U: HCPCS | Performed by: FAMILY MEDICINE

## 2024-11-20 PROCEDURE — G8484 FLU IMMUNIZE NO ADMIN: HCPCS | Performed by: FAMILY MEDICINE

## 2024-11-20 PROCEDURE — 1159F MED LIST DOCD IN RCRD: CPT | Performed by: FAMILY MEDICINE

## 2024-11-20 PROCEDURE — 99214 OFFICE O/P EST MOD 30 MIN: CPT | Performed by: FAMILY MEDICINE

## 2024-11-20 PROCEDURE — 1036F TOBACCO NON-USER: CPT | Performed by: FAMILY MEDICINE

## 2024-11-20 RX ORDER — ATORVASTATIN CALCIUM 40 MG/1
40 TABLET, FILM COATED ORAL NIGHTLY
Qty: 90 TABLET | Refills: 3 | Status: SHIPPED | OUTPATIENT
Start: 2024-11-20

## 2024-11-20 RX ORDER — ATORVASTATIN CALCIUM 40 MG/1
40 TABLET, FILM COATED ORAL NIGHTLY
Qty: 90 TABLET | Refills: 3 | Status: SHIPPED | OUTPATIENT
Start: 2024-11-20 | End: 2024-11-20 | Stop reason: SDUPTHER

## 2024-11-20 NOTE — PROGRESS NOTES
(around 11/20/2025). However, I would be happy to see him sooner should the need arise.     Sincerely,  Navjot Lennon MD, MS, [email or web address].  [facility] Cardiology Specialist    [address] [place], [place] [address]  Phone: [phone], Fax: [phone]     I believe that the risk of significant morbidity and mortality related to the patient's current medical conditions are: Intermediate. At least 30 minutes were spent during prep work, discussion and exam of the patient, and follow up documentation and all of their questions were answered.    The documentation recorded by the scribe, accurately and completely reflects the services I personally performed and the decisions made by me. Navjot Lennon MD, MS, HOLLAND.KALIE. November 20, 2024    The patient (or guardian, if applicable) and other individuals in attendance with the patient were advised that Artificial Intelligence will be utilized during this visit to record, process the conversation to generate a clinical note, and support improvement of the AI technology. The patient (or guardian, if applicable) and other individuals in attendance at the appointment consented to the use of AI, including the recording.

## 2024-12-08 ENCOUNTER — APPOINTMENT (OUTPATIENT)
Dept: CT IMAGING | Age: 84
End: 2024-12-08
Payer: MEDICARE

## 2024-12-08 ENCOUNTER — HOSPITAL ENCOUNTER (EMERGENCY)
Age: 84
Discharge: ANOTHER HOSPITAL | End: 2024-12-09
Attending: STUDENT IN AN ORGANIZED HEALTH CARE EDUCATION/TRAINING PROGRAM
Payer: MEDICARE

## 2024-12-08 DIAGNOSIS — S32.19XA OTHER CLOSED FRACTURE OF SACRUM, INITIAL ENCOUNTER (HCC): Primary | ICD-10-CM

## 2024-12-08 DIAGNOSIS — S32.592A: ICD-10-CM

## 2024-12-08 PROBLEM — S32.10XA CLOSED FRACTURE OF SACRUM, INITIAL ENCOUNTER (HCC): Status: ACTIVE | Noted: 2024-12-08

## 2024-12-08 LAB
ALBUMIN SERPL-MCNC: 3.7 G/DL (ref 3.5–5.2)
ALBUMIN/GLOB SERPL: 1.7 {RATIO} (ref 1–2.5)
ALP SERPL-CCNC: 62 U/L (ref 40–129)
ALT SERPL-CCNC: 17 U/L (ref 10–50)
ANION GAP SERPL CALCULATED.3IONS-SCNC: 10 MMOL/L (ref 9–16)
AST SERPL-CCNC: 35 U/L (ref 10–50)
BACTERIA URNS QL MICRO: ABNORMAL
BASOPHILS # BLD: <0.03 K/UL (ref 0–0.2)
BASOPHILS NFR BLD: 0 % (ref 0–2)
BILIRUB SERPL-MCNC: 0.7 MG/DL (ref 0–1.2)
BILIRUB UR QL STRIP: NEGATIVE
BUN SERPL-MCNC: 20 MG/DL (ref 8–23)
BUN/CREAT SERPL: 17 (ref 9–20)
CALCIUM SERPL-MCNC: 8.9 MG/DL (ref 8.6–10.4)
CHLORIDE SERPL-SCNC: 107 MMOL/L (ref 98–107)
CLARITY UR: CLEAR
CO2 SERPL-SCNC: 23 MMOL/L (ref 20–31)
COLOR UR: YELLOW
CREAT SERPL-MCNC: 1.2 MG/DL (ref 0.7–1.2)
EOSINOPHIL # BLD: 0.1 K/UL (ref 0–0.44)
EOSINOPHILS RELATIVE PERCENT: 1 % (ref 1–4)
EPI CELLS #/AREA URNS HPF: ABNORMAL /HPF (ref 0–5)
ERYTHROCYTE [DISTWIDTH] IN BLOOD BY AUTOMATED COUNT: 13.2 % (ref 11.8–14.4)
GFR, ESTIMATED: 62 ML/MIN/1.73M2
GLUCOSE SERPL-MCNC: 121 MG/DL (ref 74–99)
GLUCOSE UR STRIP-MCNC: NEGATIVE MG/DL
HCT VFR BLD AUTO: 35.6 % (ref 40.7–50.3)
HGB BLD-MCNC: 12.2 G/DL (ref 13–17)
HGB UR QL STRIP.AUTO: ABNORMAL
IMM GRANULOCYTES # BLD AUTO: 0.04 K/UL (ref 0–0.3)
IMM GRANULOCYTES NFR BLD: 0 %
INR PPP: 1.2
KETONES UR STRIP-MCNC: NEGATIVE MG/DL
LEUKOCYTE ESTERASE UR QL STRIP: NEGATIVE
LYMPHOCYTES NFR BLD: 1.53 K/UL (ref 1.1–3.7)
LYMPHOCYTES RELATIVE PERCENT: 16 % (ref 24–43)
MCH RBC QN AUTO: 31.9 PG (ref 25.2–33.5)
MCHC RBC AUTO-ENTMCNC: 34.3 G/DL (ref 28.4–34.8)
MCV RBC AUTO: 93.2 FL (ref 82.6–102.9)
MONOCYTES NFR BLD: 0.99 K/UL (ref 0.1–1.2)
MONOCYTES NFR BLD: 10 % (ref 3–12)
MUCOUS THREADS URNS QL MICRO: ABNORMAL
NEUTROPHILS NFR BLD: 73 % (ref 36–65)
NEUTS SEG NFR BLD: 6.83 K/UL (ref 1.5–8.1)
NITRITE UR QL STRIP: NEGATIVE
NRBC BLD-RTO: 0 PER 100 WBC
PH UR STRIP: 6 [PH] (ref 5–9)
PLATELET # BLD AUTO: 179 K/UL (ref 138–453)
PMV BLD AUTO: 8.3 FL (ref 8.1–13.5)
POTASSIUM SERPL-SCNC: 4.2 MMOL/L (ref 3.7–5.3)
PROT SERPL-MCNC: 5.9 G/DL (ref 6.6–8.7)
PROT UR STRIP-MCNC: ABNORMAL MG/DL
PROTHROMBIN TIME: 15 SEC (ref 11.7–14.1)
RBC # BLD AUTO: 3.82 M/UL (ref 4.21–5.77)
RBC #/AREA URNS HPF: ABNORMAL /HPF (ref 0–2)
SODIUM SERPL-SCNC: 140 MMOL/L (ref 136–145)
SP GR UR STRIP: >1.03 (ref 1.01–1.02)
TROPONIN I SERPL HS-MCNC: 17 NG/L (ref 0–22)
UROBILINOGEN UR STRIP-ACNC: NORMAL EU/DL (ref 0–1)
WBC #/AREA URNS HPF: ABNORMAL /HPF (ref 0–5)
WBC OTHER # BLD: 9.5 K/UL (ref 3.5–11.3)

## 2024-12-08 PROCEDURE — 71250 CT THORAX DX C-: CPT

## 2024-12-08 PROCEDURE — 99285 EMERGENCY DEPT VISIT HI MDM: CPT

## 2024-12-08 PROCEDURE — 6360000002 HC RX W HCPCS: Performed by: PHYSICIAN ASSISTANT

## 2024-12-08 PROCEDURE — 85014 HEMATOCRIT: CPT

## 2024-12-08 PROCEDURE — 80053 COMPREHEN METABOLIC PANEL: CPT

## 2024-12-08 PROCEDURE — 93005 ELECTROCARDIOGRAM TRACING: CPT | Performed by: PHYSICIAN ASSISTANT

## 2024-12-08 PROCEDURE — 85025 COMPLETE CBC W/AUTO DIFF WBC: CPT

## 2024-12-08 PROCEDURE — 85610 PROTHROMBIN TIME: CPT

## 2024-12-08 PROCEDURE — 96374 THER/PROPH/DIAG INJ IV PUSH: CPT

## 2024-12-08 PROCEDURE — 72125 CT NECK SPINE W/O DYE: CPT

## 2024-12-08 PROCEDURE — 36415 COLL VENOUS BLD VENIPUNCTURE: CPT

## 2024-12-08 PROCEDURE — 85018 HEMOGLOBIN: CPT

## 2024-12-08 PROCEDURE — 81001 URINALYSIS AUTO W/SCOPE: CPT

## 2024-12-08 PROCEDURE — 84484 ASSAY OF TROPONIN QUANT: CPT

## 2024-12-08 PROCEDURE — 70450 CT HEAD/BRAIN W/O DYE: CPT

## 2024-12-08 RX ORDER — FENTANYL CITRATE 50 UG/ML
25 INJECTION, SOLUTION INTRAMUSCULAR; INTRAVENOUS ONCE
Status: COMPLETED | OUTPATIENT
Start: 2024-12-08 | End: 2024-12-08

## 2024-12-08 RX ADMIN — FENTANYL CITRATE 25 MCG: 50 INJECTION INTRAMUSCULAR; INTRAVENOUS at 21:16

## 2024-12-08 ASSESSMENT — PAIN DESCRIPTION - LOCATION
LOCATION: PELVIS
LOCATION: PELVIS

## 2024-12-08 ASSESSMENT — PAIN SCALES - GENERAL
PAINLEVEL_OUTOF10: 9
PAINLEVEL_OUTOF10: 3

## 2024-12-08 ASSESSMENT — LIFESTYLE VARIABLES
HOW MANY STANDARD DRINKS CONTAINING ALCOHOL DO YOU HAVE ON A TYPICAL DAY: PATIENT DOES NOT DRINK
HOW OFTEN DO YOU HAVE A DRINK CONTAINING ALCOHOL: NEVER

## 2024-12-08 ASSESSMENT — PAIN DESCRIPTION - DESCRIPTORS
DESCRIPTORS: ACHING
DESCRIPTORS: ACHING

## 2024-12-08 NOTE — ED PROVIDER NOTES
[facility] EMERGENCY DEPARTMENT  EMERGENCY DEPARTMENT ENCOUNTER      Pt Name: Gonzales Sheehan  MRN: [id number]  Birthdate 1940  Date of evaluation: 12/8/2024  Provider: Ariel Valencia PA-C  8:39 PM    CHIEF COMPLAINT       Chief Complaint   Patient presents with    Fall     Pt. Reports slipping going down steps & slid down a full flight of steps. Pt.  Reports left buttocks & groin pain. Denies LOC or hitting head. Pt. Reports taking eliquis at home. Pt. Tongue bruised. No deformity noted in triage         HISTORY OF PRESENT ILLNESS    Gonzales Sheehan is a 84 y.o. male who presents to the emergency department brought in by EMS because his family was unable to ambulate him to the vehicle.  He fell this morning going to the restroom and went headfirst on his back down 15 stairs at home.  His family has been with him and because of his continued pain which seems to be lower back and pelvis and brought him in for evaluation as well as he is on Eliquis for atrial fibrillation.  He denies any headache or cervical pain associated with his fall but is unable to ambulate even a couple steps with use of a walker because of left-sided hip and low back pain.  He also has bruising to his tongue with no difficulty in speaking no laceration noted and no difficulty with breathing or swallowing.     HPI    Nursing Notes were reviewed.    REVIEW OF SYSTEMS       Review of Systems    Except as noted above the remainder of the review of systems was reviewed and negative.       PAST MEDICAL HISTORY     Past Medical History:   Diagnosis Date    AMD (age-related macular degeneration), wet (HCC)     Bradycardia     Enlarged prostate     H/O cardiovascular stress test 02/13/2018    Equivocal myocardial perfusion study. There is a small/moderate perfusion defect of mild/moderate intensity in the inferolateral and inferior regions during stress imaging which is most consistent with ischemia but may be due to artifact. Overall, thses

## 2024-12-09 ENCOUNTER — HOSPITAL ENCOUNTER (INPATIENT)
Age: 84
LOS: 1 days | Discharge: NURSING, REHAB OR LONG-TERM CARE | DRG: 552 | End: 2024-12-10
Attending: EMERGENCY MEDICINE | Admitting: SURGERY
Payer: MEDICARE

## 2024-12-09 ENCOUNTER — APPOINTMENT (OUTPATIENT)
Dept: CT IMAGING | Age: 84
DRG: 552 | End: 2024-12-09
Payer: MEDICARE

## 2024-12-09 ENCOUNTER — APPOINTMENT (OUTPATIENT)
Dept: GENERAL RADIOLOGY | Age: 84
DRG: 552 | End: 2024-12-09
Payer: MEDICARE

## 2024-12-09 VITALS
BODY MASS INDEX: 26.71 KG/M2 | HEART RATE: 60 BPM | OXYGEN SATURATION: 97 % | DIASTOLIC BLOOD PRESSURE: 54 MMHG | RESPIRATION RATE: 16 BRPM | SYSTOLIC BLOOD PRESSURE: 137 MMHG | WEIGHT: 208 LBS | TEMPERATURE: 97.9 F

## 2024-12-09 DIAGNOSIS — S32.592D: ICD-10-CM

## 2024-12-09 DIAGNOSIS — S32.9XXA CLOSED DISPLACED FRACTURE OF PELVIS, UNSPECIFIED PART OF PELVIS, INITIAL ENCOUNTER (HCC): Primary | ICD-10-CM

## 2024-12-09 DIAGNOSIS — S32.009A LUMBAR TRANSVERSE PROCESS FRACTURE, CLOSED, INITIAL ENCOUNTER (HCC): ICD-10-CM

## 2024-12-09 DIAGNOSIS — N50.1 PELVIC HEMATOMA, MALE: ICD-10-CM

## 2024-12-09 DIAGNOSIS — S32.009A CLOSED FRACTURE OF TRANSVERSE PROCESS OF LUMBAR VERTEBRA, INITIAL ENCOUNTER (HCC): ICD-10-CM

## 2024-12-09 DIAGNOSIS — S32.810S MULTIPLE CLOSED PELVIC FRACTURES WITH DISRUPTION OF PELVIC CIRCLE, SEQUELA: ICD-10-CM
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
## 2024-12-09 LAB
ABO: NORMAL
ALBUMIN SERPL BCG-MCNC: 3.4 G/DL (ref 3.5–5.1)
ALP SERPL-CCNC: 64 U/L (ref 38–126)
ALT SERPL W/O P-5'-P-CCNC: 16 U/L (ref 11–66)
ANION GAP SERPL CALC-SCNC: 9 MEQ/L (ref 8–16)
ANTIBODY SCREEN: NORMAL
AST SERPL-CCNC: 28 U/L (ref 5–40)
BASOPHILS ABSOLUTE: 0 THOU/MM3 (ref 0–0.1)
BASOPHILS NFR BLD AUTO: 0.5 %
BILIRUB SERPL-MCNC: 0.8 MG/DL (ref 0.3–1.2)
BUN SERPL-MCNC: 19 MG/DL (ref 7–22)
CALCIUM SERPL-MCNC: 9 MG/DL (ref 8.5–10.5)
CHLORIDE SERPL-SCNC: 107 MEQ/L (ref 98–111)
CK SERPL-CCNC: 601 U/L (ref 55–170)
CO2 SERPL-SCNC: 24 MEQ/L (ref 23–33)
CREAT SERPL-MCNC: 1 MG/DL (ref 0.4–1.2)
DEPRECATED RDW RBC AUTO: 46 FL (ref 35–45)
EKG ATRIAL RATE: 64 BPM
EKG P AXIS: 61 DEGREES
EKG P-R INTERVAL: 238 MS
EKG Q-T INTERVAL: 396 MS
EKG QRS DURATION: 92 MS
EKG QTC CALCULATION (BAZETT): 408 MS
EKG R AXIS: -36 DEGREES
EKG T AXIS: 21 DEGREES
EKG VENTRICULAR RATE: 64 BPM
EOSINOPHIL NFR BLD AUTO: 2.9 %
EOSINOPHILS ABSOLUTE: 0.3 THOU/MM3 (ref 0–0.4)
ERYTHROCYTE [DISTWIDTH] IN BLOOD BY AUTOMATED COUNT: 13.4 % (ref 11.5–14.5)
GFR SERPL CREATININE-BSD FRML MDRD: 73 ML/MIN/1.73M2
GLUCOSE SERPL-MCNC: 106 MG/DL (ref 70–108)
HCT VFR BLD AUTO: 34.3 % (ref 40.7–50.3)
HCT VFR BLD AUTO: 35.7 % (ref 42–52)
HGB BLD-MCNC: 11.5 G/DL (ref 13–17)
HGB BLD-MCNC: 11.9 GM/DL (ref 14–18)
IMM GRANULOCYTES # BLD AUTO: 0.05 THOU/MM3 (ref 0–0.07)
IMM GRANULOCYTES NFR BLD AUTO: 0.6 %
LYMPHOCYTES ABSOLUTE: 1.9 THOU/MM3 (ref 1–4.8)
LYMPHOCYTES NFR BLD AUTO: 21.5 %
MCH RBC QN AUTO: 31.2 PG (ref 26–33)
MCHC RBC AUTO-ENTMCNC: 33.3 GM/DL (ref 32.2–35.5)
MCV RBC AUTO: 93.5 FL (ref 80–94)
MONOCYTES ABSOLUTE: 1 THOU/MM3 (ref 0.4–1.3)
MONOCYTES NFR BLD AUTO: 11 %
NEUTROPHILS ABSOLUTE: 5.6 THOU/MM3 (ref 1.8–7.7)
NEUTROPHILS NFR BLD AUTO: 63.5 %
NRBC BLD AUTO-RTO: 0 /100 WBC
OSMOLALITY SERPL CALC.SUM OF ELEC: 282.1 MOSMOL/KG (ref 275–300)
PLATELET # BLD AUTO: 173 THOU/MM3 (ref 130–400)
PMV BLD AUTO: 8.5 FL (ref 9.4–12.4)
POTASSIUM SERPL-SCNC: 4 MEQ/L (ref 3.5–5.2)
PROT SERPL-MCNC: 6.1 G/DL (ref 6.1–8)
RBC # BLD AUTO: 3.82 MILL/MM3 (ref 4.7–6.1)
RH FACTOR: NORMAL
SODIUM SERPL-SCNC: 140 MEQ/L (ref 135–145)
WBC # BLD AUTO: 8.8 THOU/MM3 (ref 4.8–10.8)

## 2024-12-09 PROCEDURE — 86900 BLOOD TYPING SEROLOGIC ABO: CPT

## 2024-12-09 PROCEDURE — 6360000002 HC RX W HCPCS: Performed by: OCCUPATIONAL THERAPIST

## 2024-12-09 PROCEDURE — 2580000003 HC RX 258: Performed by: OCCUPATIONAL THERAPIST

## 2024-12-09 PROCEDURE — 74177 CT ABD & PELVIS W/CONTRAST: CPT

## 2024-12-09 PROCEDURE — 86850 RBC ANTIBODY SCREEN: CPT

## 2024-12-09 PROCEDURE — 73630 X-RAY EXAM OF FOOT: CPT

## 2024-12-09 PROCEDURE — 82550 ASSAY OF CK (CPK): CPT

## 2024-12-09 PROCEDURE — 6370000000 HC RX 637 (ALT 250 FOR IP): Performed by: OCCUPATIONAL THERAPIST

## 2024-12-09 PROCEDURE — 85025 COMPLETE CBC W/AUTO DIFF WBC: CPT

## 2024-12-09 PROCEDURE — 36415 COLL VENOUS BLD VENIPUNCTURE: CPT

## 2024-12-09 PROCEDURE — 72190 X-RAY EXAM OF PELVIS: CPT

## 2024-12-09 PROCEDURE — 99223 1ST HOSP IP/OBS HIGH 75: CPT | Performed by: SURGERY

## 2024-12-09 PROCEDURE — 1200000003 HC TELEMETRY R&B

## 2024-12-09 PROCEDURE — 96376 TX/PRO/DX INJ SAME DRUG ADON: CPT

## 2024-12-09 PROCEDURE — 80053 COMPREHEN METABOLIC PANEL: CPT

## 2024-12-09 PROCEDURE — 97110 THERAPEUTIC EXERCISES: CPT

## 2024-12-09 PROCEDURE — 6360000004 HC RX CONTRAST MEDICATION: Performed by: EMERGENCY MEDICINE

## 2024-12-09 PROCEDURE — 93010 ELECTROCARDIOGRAM REPORT: CPT | Performed by: INTERNAL MEDICINE

## 2024-12-09 PROCEDURE — 99285 EMERGENCY DEPT VISIT HI MDM: CPT

## 2024-12-09 PROCEDURE — 97162 PT EVAL MOD COMPLEX 30 MIN: CPT

## 2024-12-09 PROCEDURE — 6360000002 HC RX W HCPCS: Performed by: STUDENT IN AN ORGANIZED HEALTH CARE EDUCATION/TRAINING PROGRAM

## 2024-12-09 PROCEDURE — 97530 THERAPEUTIC ACTIVITIES: CPT

## 2024-12-09 PROCEDURE — 86901 BLOOD TYPING SEROLOGIC RH(D): CPT

## 2024-12-09 RX ORDER — MORPHINE SULFATE 2 MG/ML
2 INJECTION, SOLUTION INTRAMUSCULAR; INTRAVENOUS
Status: DISCONTINUED | OUTPATIENT
Start: 2024-12-09 | End: 2024-12-10 | Stop reason: HOSPADM

## 2024-12-09 RX ORDER — 0.9 % SODIUM CHLORIDE 0.9 %
500 INTRAVENOUS SOLUTION INTRAVENOUS ONCE
Status: DISCONTINUED | OUTPATIENT
Start: 2024-12-09 | End: 2024-12-10 | Stop reason: HOSPADM

## 2024-12-09 RX ORDER — ATORVASTATIN CALCIUM 40 MG/1
40 TABLET, FILM COATED ORAL NIGHTLY
Status: DISCONTINUED | OUTPATIENT
Start: 2024-12-09 | End: 2024-12-10 | Stop reason: HOSPADM

## 2024-12-09 RX ORDER — FAMOTIDINE 20 MG/1
20 TABLET, FILM COATED ORAL 2 TIMES DAILY
Status: DISCONTINUED | OUTPATIENT
Start: 2024-12-09 | End: 2024-12-10 | Stop reason: HOSPADM

## 2024-12-09 RX ORDER — ACETAMINOPHEN 325 MG/1
650 TABLET ORAL EVERY 4 HOURS PRN
Status: DISCONTINUED | OUTPATIENT
Start: 2024-12-09 | End: 2024-12-10 | Stop reason: HOSPADM

## 2024-12-09 RX ORDER — SODIUM CHLORIDE 9 MG/ML
INJECTION, SOLUTION INTRAVENOUS PRN
Status: DISCONTINUED | OUTPATIENT
Start: 2024-12-09 | End: 2024-12-10 | Stop reason: HOSPADM

## 2024-12-09 RX ORDER — FINASTERIDE 5 MG/1
5 TABLET, FILM COATED ORAL DAILY
Status: DISCONTINUED | OUTPATIENT
Start: 2024-12-09 | End: 2024-12-10 | Stop reason: HOSPADM

## 2024-12-09 RX ORDER — TRAMADOL HYDROCHLORIDE 50 MG/1
100 TABLET ORAL EVERY 6 HOURS PRN
Status: DISCONTINUED | OUTPATIENT
Start: 2024-12-09 | End: 2024-12-10 | Stop reason: HOSPADM

## 2024-12-09 RX ORDER — BISACODYL 10 MG
10 SUPPOSITORY, RECTAL RECTAL DAILY PRN
Status: DISCONTINUED | OUTPATIENT
Start: 2024-12-09 | End: 2024-12-10 | Stop reason: HOSPADM

## 2024-12-09 RX ORDER — FENTANYL CITRATE 50 UG/ML
25 INJECTION, SOLUTION INTRAMUSCULAR; INTRAVENOUS ONCE
Status: COMPLETED | OUTPATIENT
Start: 2024-12-09 | End: 2024-12-09

## 2024-12-09 RX ORDER — CYCLOBENZAPRINE HCL 10 MG
10 TABLET ORAL 3 TIMES DAILY PRN
Status: DISCONTINUED | OUTPATIENT
Start: 2024-12-09 | End: 2024-12-10 | Stop reason: HOSPADM

## 2024-12-09 RX ORDER — ONDANSETRON 4 MG/1
4 TABLET, ORALLY DISINTEGRATING ORAL EVERY 8 HOURS PRN
Status: DISCONTINUED | OUTPATIENT
Start: 2024-12-09 | End: 2024-12-10 | Stop reason: HOSPADM

## 2024-12-09 RX ORDER — POLYETHYLENE GLYCOL 3350 17 G/17G
17 POWDER, FOR SOLUTION ORAL DAILY
Status: DISCONTINUED | OUTPATIENT
Start: 2024-12-09 | End: 2024-12-10 | Stop reason: HOSPADM

## 2024-12-09 RX ORDER — TRAMADOL HYDROCHLORIDE 50 MG/1
50 TABLET ORAL EVERY 6 HOURS PRN
Status: DISCONTINUED | OUTPATIENT
Start: 2024-12-09 | End: 2024-12-10 | Stop reason: HOSPADM

## 2024-12-09 RX ORDER — ONDANSETRON 2 MG/ML
4 INJECTION INTRAMUSCULAR; INTRAVENOUS EVERY 6 HOURS PRN
Status: DISCONTINUED | OUTPATIENT
Start: 2024-12-09 | End: 2024-12-10 | Stop reason: HOSPADM

## 2024-12-09 RX ORDER — POTASSIUM CHLORIDE 7.45 MG/ML
10 INJECTION INTRAVENOUS PRN
Status: DISCONTINUED | OUTPATIENT
Start: 2024-12-09 | End: 2024-12-10 | Stop reason: HOSPADM

## 2024-12-09 RX ORDER — SODIUM CHLORIDE 0.9 % (FLUSH) 0.9 %
5-40 SYRINGE (ML) INJECTION PRN
Status: DISCONTINUED | OUTPATIENT
Start: 2024-12-09 | End: 2024-12-10 | Stop reason: HOSPADM

## 2024-12-09 RX ORDER — SODIUM CHLORIDE 0.9 % (FLUSH) 0.9 %
5-40 SYRINGE (ML) INJECTION EVERY 12 HOURS SCHEDULED
Status: DISCONTINUED | OUTPATIENT
Start: 2024-12-09 | End: 2024-12-10 | Stop reason: HOSPADM

## 2024-12-09 RX ORDER — IOPAMIDOL 755 MG/ML
80 INJECTION, SOLUTION INTRAVASCULAR
Status: COMPLETED | OUTPATIENT
Start: 2024-12-09 | End: 2024-12-09

## 2024-12-09 RX ORDER — TRAMADOL HYDROCHLORIDE 50 MG/1
50 TABLET ORAL ONCE
Status: COMPLETED | OUTPATIENT
Start: 2024-12-09 | End: 2024-12-09

## 2024-12-09 RX ORDER — MAGNESIUM SULFATE IN WATER 40 MG/ML
2000 INJECTION, SOLUTION INTRAVENOUS PRN
Status: DISCONTINUED | OUTPATIENT
Start: 2024-12-09 | End: 2024-12-10 | Stop reason: HOSPADM

## 2024-12-09 RX ORDER — POTASSIUM CHLORIDE 29.8 MG/ML
20 INJECTION INTRAVENOUS PRN
Status: DISCONTINUED | OUTPATIENT
Start: 2024-12-09 | End: 2024-12-10 | Stop reason: HOSPADM

## 2024-12-09 RX ORDER — SENNOSIDES A AND B 8.6 MG/1
1 TABLET, FILM COATED ORAL DAILY PRN
Status: DISCONTINUED | OUTPATIENT
Start: 2024-12-09 | End: 2024-12-10

## 2024-12-09 RX ORDER — TAMSULOSIN HYDROCHLORIDE 0.4 MG/1
0.4 CAPSULE ORAL DAILY
Status: DISCONTINUED | OUTPATIENT
Start: 2024-12-09 | End: 2024-12-10 | Stop reason: HOSPADM

## 2024-12-09 RX ADMIN — SODIUM CHLORIDE, PRESERVATIVE FREE 10 ML: 5 INJECTION INTRAVENOUS at 10:16

## 2024-12-09 RX ADMIN — TRAMADOL HYDROCHLORIDE 50 MG: 50 TABLET ORAL at 03:28

## 2024-12-09 RX ADMIN — TRAMADOL HYDROCHLORIDE 50 MG: 50 TABLET ORAL at 12:57

## 2024-12-09 RX ADMIN — FENTANYL CITRATE 25 MCG: 50 INJECTION INTRAMUSCULAR; INTRAVENOUS at 00:06

## 2024-12-09 RX ADMIN — MORPHINE SULFATE 2 MG: 2 INJECTION, SOLUTION INTRAMUSCULAR; INTRAVENOUS at 16:25

## 2024-12-09 RX ADMIN — ATORVASTATIN CALCIUM 40 MG: 40 TABLET, FILM COATED ORAL at 21:13

## 2024-12-09 RX ADMIN — TRAMADOL HYDROCHLORIDE 100 MG: 50 TABLET ORAL at 21:13

## 2024-12-09 RX ADMIN — TAMSULOSIN HYDROCHLORIDE 0.4 MG: 0.4 CAPSULE ORAL at 21:13

## 2024-12-09 RX ADMIN — FINASTERIDE 5 MG: 5 TABLET, FILM COATED ORAL at 21:13

## 2024-12-09 RX ADMIN — POLYETHYLENE GLYCOL 3350 17 G: 17 POWDER, FOR SOLUTION ORAL at 10:16

## 2024-12-09 RX ADMIN — MORPHINE SULFATE 2 MG: 2 INJECTION, SOLUTION INTRAMUSCULAR; INTRAVENOUS at 06:56

## 2024-12-09 RX ADMIN — IOPAMIDOL 80 ML: 755 INJECTION, SOLUTION INTRAVENOUS at 02:31

## 2024-12-09 RX ADMIN — SODIUM CHLORIDE, PRESERVATIVE FREE 10 ML: 5 INJECTION INTRAVENOUS at 23:10

## 2024-12-09 RX ADMIN — FAMOTIDINE 20 MG: 20 TABLET, FILM COATED ORAL at 21:13

## 2024-12-09 RX ADMIN — FAMOTIDINE 20 MG: 20 TABLET, FILM COATED ORAL at 10:16

## 2024-12-09 ASSESSMENT — PAIN SCALES - GENERAL
PAINLEVEL_OUTOF10: 7
PAINLEVEL_OUTOF10: 8
PAINLEVEL_OUTOF10: 2
PAINLEVEL_OUTOF10: 4
PAINLEVEL_OUTOF10: 5
PAINLEVEL_OUTOF10: 5
PAINLEVEL_OUTOF10: 10
PAINLEVEL_OUTOF10: 3
PAINLEVEL_OUTOF10: 8
PAINLEVEL_OUTOF10: 5
PAINLEVEL_OUTOF10: 3
PAINLEVEL_OUTOF10: 5
PAINLEVEL_OUTOF10: 5

## 2024-12-09 ASSESSMENT — PAIN DESCRIPTION - LOCATION
LOCATION: HIP
LOCATION: PELVIS
LOCATION: SACRUM
LOCATION: HIP

## 2024-12-09 ASSESSMENT — ENCOUNTER SYMPTOMS
COUGH: 0
VOMITING: 0
COLOR CHANGE: 0
SHORTNESS OF BREATH: 0
BACK PAIN: 1
EYE PAIN: 0
ABDOMINAL PAIN: 0
NAUSEA: 0

## 2024-12-09 ASSESSMENT — PAIN - FUNCTIONAL ASSESSMENT
PAIN_FUNCTIONAL_ASSESSMENT: ACTIVITIES ARE NOT PREVENTED
PAIN_FUNCTIONAL_ASSESSMENT: 0-10
PAIN_FUNCTIONAL_ASSESSMENT: ACTIVITIES ARE NOT PREVENTED
PAIN_FUNCTIONAL_ASSESSMENT: 0-10
PAIN_FUNCTIONAL_ASSESSMENT: NONE - DENIES PAIN
PAIN_FUNCTIONAL_ASSESSMENT: ACTIVITIES ARE NOT PREVENTED
PAIN_FUNCTIONAL_ASSESSMENT: ACTIVITIES ARE NOT PREVENTED

## 2024-12-09 ASSESSMENT — PATIENT HEALTH QUESTIONNAIRE - PHQ9
SUM OF ALL RESPONSES TO PHQ QUESTIONS 1-9: 0
1. LITTLE INTEREST OR PLEASURE IN DOING THINGS: NOT AT ALL
SUM OF ALL RESPONSES TO PHQ9 QUESTIONS 1 & 2: 0
SUM OF ALL RESPONSES TO PHQ QUESTIONS 1-9: 0
SUM OF ALL RESPONSES TO PHQ QUESTIONS 1-9: 0
2. FEELING DOWN, DEPRESSED OR HOPELESS: NOT AT ALL
SUM OF ALL RESPONSES TO PHQ QUESTIONS 1-9: 0

## 2024-12-09 ASSESSMENT — PAIN DESCRIPTION - DESCRIPTORS
DESCRIPTORS: ACHING
DESCRIPTORS: STABBING
DESCRIPTORS: ACHING
DESCRIPTORS: STABBING
DESCRIPTORS: STABBING
DESCRIPTORS: ACHING

## 2024-12-09 ASSESSMENT — PAIN DESCRIPTION - ORIENTATION
ORIENTATION: LEFT

## 2024-12-09 ASSESSMENT — PAIN DESCRIPTION - PAIN TYPE
TYPE: ACUTE PAIN

## 2024-12-09 ASSESSMENT — LIFESTYLE VARIABLES
HOW MANY STANDARD DRINKS CONTAINING ALCOHOL DO YOU HAVE ON A TYPICAL DAY: 3 OR 4
HOW OFTEN DO YOU HAVE A DRINK CONTAINING ALCOHOL: 2-4 TIMES A MONTH

## 2024-12-09 ASSESSMENT — PAIN SCALES - WONG BAKER: WONGBAKER_NUMERICALRESPONSE: NO HURT

## 2024-12-09 NOTE — ED NOTES
Pt. To be transferred to [facility]. Spoke to Marialuisa prior to transfer. Pt. In stable condition at this time.

## 2024-12-09 NOTE — ED PROVIDER NOTES
Referral to Emergency Department     Referring Physician: Dr. Francois Wesley  Referring Facility: Hospital: [facility] Attending Receiving Referral: Mohan Atkinson MD  Date: 12/8/24  Time: 11:14 PM EST     Brief History: 84-year-old gentleman who slipped and fell down a full slight flight of stairs at around 1 AM earlier today.  Presented to the [place] emergency department at around 8 PM with right sided hip pain.  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  Acute comminuted fracture of the left sacral kris, traumatic avulsion fracture of the left pubic symphysis with diastases of the pubic symphysis measuring approximately 2.3 cm there is also associated hemorrhage with both the sacral kris fracture and the pubic symphysis diastases.  Additionally acute fractures at the left L4 and L5 transverse process were also noted.  CT scans were done without IV contrast.  Images are available in PACS.    Vitals are stable.       Clinical Concern: Trauma with left sacral ala fracture, pubic symphysis diastases both with associated hematomas.      Charge nurse notified of expected patient.    Mohan Atkinson MD  11:14 PM Mohan Marshall MD  12/08/24 [id number]

## 2024-12-09 NOTE — ED NOTES
Patient resting on cot with eyes closed. Respirations even and unlabored. VS stable. Call light in reach. Patient family at bedside. Patient denies needs at this time.

## 2024-12-09 NOTE — H&P
minutes collectively between subjective/objective examination, chart review, documentation, clinical reasoning and discussion with attending regarding plan/interval changes.    Electronically signed by Schuyler Roman MD on 12/9/2024 at 5:43 AM     Patient seen and examined independently by me 12/9/2024  MARIAMA after fall > 24 hrs prior  Pelvic fxs with diastasis of pubic symphysis TP fx lumbar  Ortho consult  Pain control  Therapies  May need I{AR depending on course  Was on eliquis, last dose Saturday stable Hg no plans for reversal   I personally supervised the PA/NP in the evaluation, management and development of the treatment plan for Gonzales Sheehan  on the same date of service as above.      I personally interviewed Gonzales Sheehan   and  discussed his review of symptoms as able due to the patient's condition, as well as performed an individual physical exam on the same   date of service as above.  In addition I discussed the patient's condition and treatment options with the patient, if able, and/or designated family if available.      I have also reviewed and agree with the past medical,  family and social history updates as well as care plans unless otherwise noted below.  All questions were answered.      I examined independently and reviewed relevant data myself and may have done so in the context of team rounds.  A full chart review was performed by me.       I attest that this medical record entry accurately reflects signatures and notations that I made in my capacity as an M.D. when I treated and diagnosed Gonzales Sheehan on the date of service above     I was responsible for all medical decision making involving this encounter.      I identified and/or confirmed all problems associated with this patient encounter by my own direct physical examination of this patient and review of all radiology studies and labwork  that were ordered and available.    Active Hospital Problems    Diagnosis

## 2024-12-09 NOTE — ED NOTES
ED to inpatient nurses report      Chief Complaint:  Chief Complaint   Patient presents with    Trauma Transfer     Present to ED from: home - trauma transfer from [place]     MOA:     LOC: alert and orientated to name, place, date  Mobility: Fully dependent  Oxygen Baseline: RA    Current needs required: RA     Code Status:   Full Code    What abnormal results were found and what did you give/do to treat them? Pelvic Fracture/Lumbar Fracture      Mental Status:  Level of Consciousness: Alert (0)    Psych Assessment:        Vitals:  Patient Vitals for the past 24 hrs:   BP Temp Pulse Resp SpO2 Weight   12/09/24 0550 -- -- 59 17 96 % --   12/09/24 0524 -- -- 62 14 95 % --   12/09/24 0358 131/74 -- 62 17 95 % --   12/09/24 0329 (!) 108/48 -- 68 19 95 % --   12/09/24 0313 134/74 -- 62 13 95 % --   12/09/24 0212 139/77 -- 67 12 97 % --   12/09/24 0157 124/74 -- 72 16 94 % --   12/09/24 0144 -- 97.4 °F (36.3 °C) -- -- -- --   12/09/24 0141 (!) 150/95 -- 63 20 97 % 94.3 kg (208 lb)        LDAs:   Peripheral IV 12/08/24 Right Antecubital (Active)       Peripheral IV 12/09/24 Right Antecubital (Active)       Peripheral IV 12/09/24 Left Antecubital (Active)       Ambulatory Status:  Presents to emergency department  because of falls (Syncope, seizure, or loss of consciousness): Yes, Age > 70: Yes, Altered Mental Status, Intoxication with alcohol or substance confusion (Disorientation, impaired judgment, poor safety awaremess, or inability to follow instructions): No, Impaired Mobility: Ambulates or transfers with assistive devices or assistance; Unable to ambulate or transer.: No, Nursing Judgement: Yes    Diagnosis:  DISPOSITION Admitted 12/09/2024 02:58:26 AM   Final diagnoses:   Closed displaced fracture of pelvis, unspecified part of pelvis, initial encounter ([facility])   Closed fracture of transverse process of lumbar vertebra, initial encounter ([facility])        Consults:  IP CONSULT TO ORTHOPEDIC SURGERY     Pain Score:  Pain

## 2024-12-09 NOTE — ED TRIAGE NOTES
Patient into the ED by ems as a trauma consult transfer from [facility]. Pt reports he fell down several stairs on 12/8 at 0100. LOC denied. Pt on Elequis. Fractures of L4 & L5 and a pelvic fracture with left sacral displacement reported. Pelvic binder in place upon arrival. Pt a/o x4. [place] reports giving 25 mcg fentanyl at 0000. 2/10 pain reported. Dr. Garcia at bedside.

## 2024-12-09 NOTE — ED PROVIDER NOTES
[facility] ORTHOPEDICS [facility]K      EMERGENCY MEDICINE     Pt Name: Gonzales Sheehan  MRN: [id number]  Birthdate 1940  Date of evaluation: 12/8/2024  Provider: PADILLA CUMMINGS MD    CHIEF COMPLAINT       Chief Complaint   Patient presents with    Trauma Transfer     HISTORY OF PRESENT ILLNESS   Gonzales Sheehan is a pleasant 84 y.o. male who presents to the emergency department from as a transfer from [facility], brought in by EMS for evaluation of traumatic pelvic and spinal fractures.  Patient was seen at [facility] after falling down stairs.  He stated that he fell going down 15 stairs at home.  This occurred on the morning of the eighth.  He had increasing pain and so family brought him to be evaluated.  At [facility] they found that he had acute comminuted fracture of the left sacral kris, traumatic avulsion fracture of the left pubic symphysis with diastasis of the pubic symphysis and traumatic fractures of left L4 and L5 transverse processes.  Patient arrived with normal vital signs.  Pelvic binder was in place.  Patient is on Eliquis for A-fib.  He was transferred here for trauma consultation and orthopedics.    PASTMEDICAL HISTORY     Past Medical History:   Diagnosis Date    AMD (age-related macular degeneration), wet (HCC)     Bradycardia     Enlarged prostate     H/O cardiovascular stress test 02/13/2018    Equivocal myocardial perfusion study. There is a small/moderate perfusion defect of mild/moderate intensity in the inferolateral and inferior regions during stress imaging which is most consistent with ischemia but may be due to artifact. Overall, thses results are most consistent with a low risk for CAD    H/O echocardiogram 02/13/2018    EF 55-60%. The left ventiruclar wall thickness is mildly incrased. Aortic valve calcification without significant stenosis or regurgirtation. Evidence of mild (grade I) diastolic dysfucntion is seen.     History of Holter monitoring 01/29/2018    Atrial fibrillation for 51%

## 2024-12-09 NOTE — PLAN OF CARE
Problem: Discharge Planning  Goal: Discharge to home or other facility with appropriate resources  Outcome: Progressing  Flowsheets  Taken 12/9/2024 1136  Discharge to home or other facility with appropriate resources: Identify barriers to discharge with patient and caregiver  Taken 12/9/2024 0801  Discharge to home or other facility with appropriate resources: Identify barriers to discharge with patient and caregiver     Problem: Pain  Goal: Verbalizes/displays adequate comfort level or baseline comfort level  Outcome: Progressing  Flowsheets (Taken 12/9/2024 1136)  Verbalizes/displays adequate comfort level or baseline comfort level: Encourage patient to monitor pain and request assistance     Problem: ABCDS Injury Assessment  Goal: Absence of physical injury  Outcome: Progressing  Flowsheets (Taken 12/9/2024 1136)  Absence of Physical Injury: Implement safety measures based on patient assessment     Problem: Safety - Adult  Goal: Free from fall injury  Outcome: Progressing  Flowsheets (Taken 12/9/2024 1136)  Free From Fall Injury: Instruct family/caregiver on patient safety     Problem: Skin/Tissue Integrity - Adult  Goal: Skin integrity remains intact  Outcome: Progressing  Flowsheets (Taken 12/9/2024 1136)  Skin Integrity Remains Intact: Monitor for areas of redness and/or skin breakdown     Problem: Musculoskeletal - Adult  Goal: Return mobility to safest level of function  Outcome: Progressing  Flowsheets (Taken 12/9/2024 1136)  Return Mobility to Safest Level of Function: Assess patient stability and activity tolerance for standing, transferring and ambulating with or without assistive devices     Problem: Gastrointestinal - Adult  Goal: Maintains or returns to baseline bowel function  Outcome: Progressing  Flowsheets (Taken 12/9/2024 1136)  Maintains or returns to baseline bowel function: Assess bowel function     Problem: Genitourinary - Adult  Goal: Absence of urinary retention  Outcome:

## 2024-12-10 ENCOUNTER — HOSPITAL ENCOUNTER (INPATIENT)
Age: 84
LOS: 14 days | Discharge: HOME HEALTH | DRG: 560 | End: 2024-12-24
Attending: STUDENT IN AN ORGANIZED HEALTH CARE EDUCATION/TRAINING PROGRAM | Admitting: STUDENT IN AN ORGANIZED HEALTH CARE EDUCATION/TRAINING PROGRAM
Payer: MEDICARE

## 2024-12-10 VITALS
TEMPERATURE: 98.2 F | RESPIRATION RATE: 18 BRPM | HEART RATE: 67 BPM | BODY MASS INDEX: 26.03 KG/M2 | HEIGHT: 74 IN | SYSTOLIC BLOOD PRESSURE: 118 MMHG | OXYGEN SATURATION: 93 % | DIASTOLIC BLOOD PRESSURE: 62 MMHG | WEIGHT: 202.82 LBS

## 2024-12-10 DIAGNOSIS — S32.10XA CLOSED FRACTURE OF SACRUM, INITIAL ENCOUNTER (HCC): ICD-10-CM

## 2024-12-10 DIAGNOSIS — S32.810S MULTIPLE CLOSED PELVIC FRACTURES WITH DISRUPTION OF PELVIC CIRCLE, SEQUELA: ICD-10-CM

## 2024-12-10 DIAGNOSIS — S32.009A LUMBAR TRANSVERSE PROCESS FRACTURE, CLOSED, INITIAL ENCOUNTER (HCC): Primary | ICD-10-CM

## 2024-12-10 DIAGNOSIS — N50.1 PELVIC HEMATOMA, MALE: ICD-10-CM
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
## 2024-12-10 DIAGNOSIS — I48.0 PAROXYSMAL ATRIAL FIBRILLATION (HCC): ICD-10-CM

## 2024-12-10 DIAGNOSIS — S32.592D: ICD-10-CM
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
## 2024-12-10 LAB
ANION GAP SERPL CALC-SCNC: 10 MEQ/L (ref 8–16)
BASOPHILS ABSOLUTE: 0 THOU/MM3 (ref 0–0.1)
BASOPHILS NFR BLD AUTO: 0.3 %
BUN SERPL-MCNC: 18 MG/DL (ref 7–22)
CALCIUM SERPL-MCNC: 8.6 MG/DL (ref 8.5–10.5)
CHLORIDE SERPL-SCNC: 105 MEQ/L (ref 98–111)
CO2 SERPL-SCNC: 21 MEQ/L (ref 23–33)
CREAT SERPL-MCNC: 0.9 MG/DL (ref 0.4–1.2)
DEPRECATED RDW RBC AUTO: 47.8 FL (ref 35–45)
EOSINOPHIL NFR BLD AUTO: 3.4 %
EOSINOPHILS ABSOLUTE: 0.3 THOU/MM3 (ref 0–0.4)
ERYTHROCYTE [DISTWIDTH] IN BLOOD BY AUTOMATED COUNT: 13.5 % (ref 11.5–14.5)
GFR SERPL CREATININE-BSD FRML MDRD: 83 ML/MIN/1.73M2
GLUCOSE SERPL-MCNC: 118 MG/DL (ref 70–108)
HCT VFR BLD AUTO: 31.6 % (ref 42–52)
HGB BLD-MCNC: 10.2 GM/DL (ref 14–18)
IMM GRANULOCYTES # BLD AUTO: 0.05 THOU/MM3 (ref 0–0.07)
IMM GRANULOCYTES NFR BLD AUTO: 0.5 %
LYMPHOCYTES ABSOLUTE: 1.6 THOU/MM3 (ref 1–4.8)
LYMPHOCYTES NFR BLD AUTO: 16.6 %
MCH RBC QN AUTO: 31 PG (ref 26–33)
MCHC RBC AUTO-ENTMCNC: 32.3 GM/DL (ref 32.2–35.5)
MCV RBC AUTO: 96 FL (ref 80–94)
MONOCYTES ABSOLUTE: 1 THOU/MM3 (ref 0.4–1.3)
MONOCYTES NFR BLD AUTO: 10.6 %
NEUTROPHILS ABSOLUTE: 6.6 THOU/MM3 (ref 1.8–7.7)
NEUTROPHILS NFR BLD AUTO: 68.6 %
NRBC BLD AUTO-RTO: 0 /100 WBC
PLATELET # BLD AUTO: 157 THOU/MM3 (ref 130–400)
PMV BLD AUTO: 8.8 FL (ref 9.4–12.4)
POTASSIUM SERPL-SCNC: 4.2 MEQ/L (ref 3.5–5.2)
RBC # BLD AUTO: 3.29 MILL/MM3 (ref 4.7–6.1)
SODIUM SERPL-SCNC: 136 MEQ/L (ref 135–145)
WBC # BLD AUTO: 9.6 THOU/MM3 (ref 4.8–10.8)

## 2024-12-10 PROCEDURE — 6370000000 HC RX 637 (ALT 250 FOR IP): Performed by: OCCUPATIONAL THERAPIST

## 2024-12-10 PROCEDURE — 36415 COLL VENOUS BLD VENIPUNCTURE: CPT

## 2024-12-10 PROCEDURE — 2580000003 HC RX 258: Performed by: OCCUPATIONAL THERAPIST

## 2024-12-10 PROCEDURE — 1180000000 HC REHAB R&B

## 2024-12-10 PROCEDURE — 99223 1ST HOSP IP/OBS HIGH 75: CPT | Performed by: NURSE PRACTITIONER

## 2024-12-10 PROCEDURE — 97110 THERAPEUTIC EXERCISES: CPT

## 2024-12-10 PROCEDURE — 6360000002 HC RX W HCPCS: Performed by: OCCUPATIONAL THERAPIST

## 2024-12-10 PROCEDURE — 97535 SELF CARE MNGMENT TRAINING: CPT

## 2024-12-10 PROCEDURE — 80048 BASIC METABOLIC PNL TOTAL CA: CPT

## 2024-12-10 PROCEDURE — 97530 THERAPEUTIC ACTIVITIES: CPT

## 2024-12-10 PROCEDURE — 2580000003 HC RX 258: Performed by: NURSE PRACTITIONER

## 2024-12-10 PROCEDURE — 85025 COMPLETE CBC W/AUTO DIFF WBC: CPT

## 2024-12-10 PROCEDURE — 6370000000 HC RX 637 (ALT 250 FOR IP): Performed by: NURSE PRACTITIONER

## 2024-12-10 PROCEDURE — 99239 HOSP IP/OBS DSCHRG MGMT >30: CPT | Performed by: SURGERY

## 2024-12-10 PROCEDURE — 97166 OT EVAL MOD COMPLEX 45 MIN: CPT

## 2024-12-10 RX ORDER — POLYETHYLENE GLYCOL 3350 17 G/17G
17 POWDER, FOR SOLUTION ORAL DAILY
Status: CANCELLED | OUTPATIENT
Start: 2024-12-11

## 2024-12-10 RX ORDER — MAGNESIUM SULFATE IN WATER 40 MG/ML
2000 INJECTION, SOLUTION INTRAVENOUS PRN
Status: CANCELLED | OUTPATIENT
Start: 2024-12-10

## 2024-12-10 RX ORDER — ASCORBIC ACID 500 MG
500 TABLET ORAL 2 TIMES DAILY WITH MEALS
Status: DISCONTINUED | OUTPATIENT
Start: 2024-12-11 | End: 2024-12-19

## 2024-12-10 RX ORDER — ACETAMINOPHEN 325 MG/1
650 TABLET ORAL EVERY 4 HOURS PRN
Status: DISCONTINUED | OUTPATIENT
Start: 2024-12-10 | End: 2024-12-12

## 2024-12-10 RX ORDER — CYCLOBENZAPRINE HCL 10 MG
10 TABLET ORAL 3 TIMES DAILY PRN
Status: DISCONTINUED | OUTPATIENT
Start: 2024-12-10 | End: 2024-12-24 | Stop reason: HOSPADM

## 2024-12-10 RX ORDER — FINASTERIDE 5 MG/1
5 TABLET, FILM COATED ORAL DAILY
Status: DISCONTINUED | OUTPATIENT
Start: 2024-12-10 | End: 2024-12-24 | Stop reason: HOSPADM

## 2024-12-10 RX ORDER — ATORVASTATIN CALCIUM 40 MG/1
40 TABLET, FILM COATED ORAL NIGHTLY
Status: DISCONTINUED | OUTPATIENT
Start: 2024-12-10 | End: 2024-12-24 | Stop reason: HOSPADM

## 2024-12-10 RX ORDER — TRAMADOL HYDROCHLORIDE 50 MG/1
100 TABLET ORAL EVERY 6 HOURS PRN
Status: DISCONTINUED | OUTPATIENT
Start: 2024-12-10 | End: 2024-12-24 | Stop reason: HOSPADM

## 2024-12-10 RX ORDER — SODIUM CHLORIDE 9 MG/ML
INJECTION, SOLUTION INTRAVENOUS PRN
Status: DISCONTINUED | OUTPATIENT
Start: 2024-12-10 | End: 2024-12-24 | Stop reason: HOSPADM

## 2024-12-10 RX ORDER — POTASSIUM CHLORIDE 7.45 MG/ML
10 INJECTION INTRAVENOUS PRN
Status: DISCONTINUED | OUTPATIENT
Start: 2024-12-10 | End: 2024-12-24 | Stop reason: HOSPADM

## 2024-12-10 RX ORDER — SODIUM CHLORIDE 0.9 % (FLUSH) 0.9 %
5-40 SYRINGE (ML) INJECTION PRN
Status: CANCELLED | OUTPATIENT
Start: 2024-12-10

## 2024-12-10 RX ORDER — ONDANSETRON 4 MG/1
4 TABLET, ORALLY DISINTEGRATING ORAL EVERY 8 HOURS PRN
Status: CANCELLED | OUTPATIENT
Start: 2024-12-10

## 2024-12-10 RX ORDER — TRAMADOL HYDROCHLORIDE 50 MG/1
50 TABLET ORAL EVERY 6 HOURS PRN
Status: CANCELLED | OUTPATIENT
Start: 2024-12-10

## 2024-12-10 RX ORDER — SODIUM CHLORIDE 0.9 % (FLUSH) 0.9 %
5-40 SYRINGE (ML) INJECTION EVERY 12 HOURS SCHEDULED
Status: DISCONTINUED | OUTPATIENT
Start: 2024-12-10 | End: 2024-12-18

## 2024-12-10 RX ORDER — BISACODYL 10 MG
10 SUPPOSITORY, RECTAL RECTAL DAILY PRN
Status: DISCONTINUED | OUTPATIENT
Start: 2024-12-10 | End: 2024-12-24 | Stop reason: HOSPADM

## 2024-12-10 RX ORDER — SENNOSIDES A AND B 8.6 MG/1
2 TABLET, FILM COATED ORAL NIGHTLY
Status: CANCELLED | OUTPATIENT
Start: 2024-12-10

## 2024-12-10 RX ORDER — FERROUS SULFATE 325(65) MG
325 TABLET ORAL 2 TIMES DAILY WITH MEALS
Status: DISCONTINUED | OUTPATIENT
Start: 2024-12-11 | End: 2024-12-19

## 2024-12-10 RX ORDER — FAMOTIDINE 20 MG/1
20 TABLET, FILM COATED ORAL 2 TIMES DAILY
Status: DISCONTINUED | OUTPATIENT
Start: 2024-12-10 | End: 2024-12-24 | Stop reason: HOSPADM

## 2024-12-10 RX ORDER — SENNOSIDES A AND B 8.6 MG/1
2 TABLET, FILM COATED ORAL NIGHTLY
Status: DISCONTINUED | OUTPATIENT
Start: 2024-12-10 | End: 2024-12-24 | Stop reason: HOSPADM

## 2024-12-10 RX ORDER — CALCIUM CARBONATE 500(1250)
500 TABLET ORAL 2 TIMES DAILY WITH MEALS
Status: DISCONTINUED | OUTPATIENT
Start: 2024-12-11 | End: 2024-12-24 | Stop reason: HOSPADM

## 2024-12-10 RX ORDER — MAGNESIUM SULFATE IN WATER 40 MG/ML
2000 INJECTION, SOLUTION INTRAVENOUS PRN
Status: DISCONTINUED | OUTPATIENT
Start: 2024-12-10 | End: 2024-12-24 | Stop reason: HOSPADM

## 2024-12-10 RX ORDER — ATORVASTATIN CALCIUM 40 MG/1
40 TABLET, FILM COATED ORAL NIGHTLY
Status: CANCELLED | OUTPATIENT
Start: 2024-12-10

## 2024-12-10 RX ORDER — BISACODYL 10 MG
10 SUPPOSITORY, RECTAL RECTAL DAILY PRN
Status: CANCELLED | OUTPATIENT
Start: 2024-12-10

## 2024-12-10 RX ORDER — DOCUSATE SODIUM 100 MG/1
100 CAPSULE, LIQUID FILLED ORAL 2 TIMES DAILY
Status: DISCONTINUED | OUTPATIENT
Start: 2024-12-10 | End: 2024-12-19

## 2024-12-10 RX ORDER — ONDANSETRON 4 MG/1
4 TABLET, ORALLY DISINTEGRATING ORAL EVERY 8 HOURS PRN
Status: DISCONTINUED | OUTPATIENT
Start: 2024-12-10 | End: 2024-12-24 | Stop reason: HOSPADM

## 2024-12-10 RX ORDER — CYCLOBENZAPRINE HCL 10 MG
10 TABLET ORAL 3 TIMES DAILY PRN
Qty: 20 TABLET | Refills: 0 | Status: ON HOLD | OUTPATIENT
Start: 2024-12-10 | End: 2024-12-20

## 2024-12-10 RX ORDER — POLYETHYLENE GLYCOL 3350 17 G/17G
17 POWDER, FOR SOLUTION ORAL DAILY
Status: DISCONTINUED | OUTPATIENT
Start: 2024-12-11 | End: 2024-12-19

## 2024-12-10 RX ORDER — POTASSIUM CHLORIDE 29.8 MG/ML
20 INJECTION INTRAVENOUS PRN
Status: CANCELLED | OUTPATIENT
Start: 2024-12-10

## 2024-12-10 RX ORDER — TRAMADOL HYDROCHLORIDE 50 MG/1
50 TABLET ORAL EVERY 6 HOURS PRN
Status: DISCONTINUED | OUTPATIENT
Start: 2024-12-10 | End: 2024-12-24 | Stop reason: HOSPADM

## 2024-12-10 RX ORDER — POTASSIUM CHLORIDE 7.45 MG/ML
10 INJECTION INTRAVENOUS PRN
Status: CANCELLED | OUTPATIENT
Start: 2024-12-10

## 2024-12-10 RX ORDER — POTASSIUM CHLORIDE 29.8 MG/ML
20 INJECTION INTRAVENOUS PRN
Status: DISCONTINUED | OUTPATIENT
Start: 2024-12-10 | End: 2024-12-24 | Stop reason: HOSPADM

## 2024-12-10 RX ORDER — SODIUM CHLORIDE 0.9 % (FLUSH) 0.9 %
5-40 SYRINGE (ML) INJECTION EVERY 12 HOURS SCHEDULED
Status: CANCELLED | OUTPATIENT
Start: 2024-12-10

## 2024-12-10 RX ORDER — CYCLOBENZAPRINE HCL 10 MG
10 TABLET ORAL 3 TIMES DAILY PRN
Status: CANCELLED | OUTPATIENT
Start: 2024-12-10

## 2024-12-10 RX ORDER — TRAMADOL HYDROCHLORIDE 50 MG/1
50 TABLET ORAL EVERY 6 HOURS PRN
Qty: 20 TABLET | Refills: 0 | Status: ON HOLD | OUTPATIENT
Start: 2024-12-10 | End: 2024-12-15

## 2024-12-10 RX ORDER — TAMSULOSIN HYDROCHLORIDE 0.4 MG/1
0.4 CAPSULE ORAL DAILY
Status: CANCELLED | OUTPATIENT
Start: 2024-12-10

## 2024-12-10 RX ORDER — DOCUSATE SODIUM 100 MG/1
100 CAPSULE, LIQUID FILLED ORAL 2 TIMES DAILY
Status: DISCONTINUED | OUTPATIENT
Start: 2024-12-10 | End: 2024-12-10 | Stop reason: HOSPADM

## 2024-12-10 RX ORDER — POLYETHYLENE GLYCOL 3350 17 G/17G
17 POWDER, FOR SOLUTION ORAL DAILY
Qty: 30 PACKET | Refills: 0 | Status: ON HOLD | OUTPATIENT
Start: 2024-12-11 | End: 2025-01-10

## 2024-12-10 RX ORDER — DOCUSATE SODIUM 100 MG/1
100 CAPSULE, LIQUID FILLED ORAL 2 TIMES DAILY
Status: CANCELLED | OUTPATIENT
Start: 2024-12-10

## 2024-12-10 RX ORDER — SENNOSIDES A AND B 8.6 MG/1
2 TABLET, FILM COATED ORAL NIGHTLY
Status: DISCONTINUED | OUTPATIENT
Start: 2024-12-10 | End: 2024-12-10 | Stop reason: HOSPADM

## 2024-12-10 RX ORDER — ACETAMINOPHEN 325 MG/1
650 TABLET ORAL EVERY 4 HOURS PRN
Status: CANCELLED | OUTPATIENT
Start: 2024-12-10

## 2024-12-10 RX ORDER — FAMOTIDINE 20 MG/1
20 TABLET, FILM COATED ORAL 2 TIMES DAILY
Status: CANCELLED | OUTPATIENT
Start: 2024-12-10

## 2024-12-10 RX ORDER — SODIUM CHLORIDE 0.9 % (FLUSH) 0.9 %
5-40 SYRINGE (ML) INJECTION PRN
Status: DISCONTINUED | OUTPATIENT
Start: 2024-12-10 | End: 2024-12-24 | Stop reason: HOSPADM

## 2024-12-10 RX ORDER — TAMSULOSIN HYDROCHLORIDE 0.4 MG/1
0.4 CAPSULE ORAL DAILY
Status: DISCONTINUED | OUTPATIENT
Start: 2024-12-10 | End: 2024-12-18

## 2024-12-10 RX ORDER — TRAMADOL HYDROCHLORIDE 50 MG/1
100 TABLET ORAL EVERY 6 HOURS PRN
Status: CANCELLED | OUTPATIENT
Start: 2024-12-10

## 2024-12-10 RX ORDER — SODIUM CHLORIDE 9 MG/ML
INJECTION, SOLUTION INTRAVENOUS PRN
Status: CANCELLED | OUTPATIENT
Start: 2024-12-10

## 2024-12-10 RX ORDER — FINASTERIDE 5 MG/1
5 TABLET, FILM COATED ORAL DAILY
Status: CANCELLED | OUTPATIENT
Start: 2024-12-10

## 2024-12-10 RX ORDER — SENNOSIDES A AND B 8.6 MG/1
2 TABLET, FILM COATED ORAL NIGHTLY
Qty: 60 TABLET | Refills: 0 | Status: ON HOLD | OUTPATIENT
Start: 2024-12-10 | End: 2025-01-09

## 2024-12-10 RX ADMIN — SODIUM CHLORIDE, PRESERVATIVE FREE 10 ML: 5 INJECTION INTRAVENOUS at 08:47

## 2024-12-10 RX ADMIN — DOCUSATE SODIUM 100 MG: 100 CAPSULE, LIQUID FILLED ORAL at 13:14

## 2024-12-10 RX ADMIN — MORPHINE SULFATE 2 MG: 2 INJECTION, SOLUTION INTRAMUSCULAR; INTRAVENOUS at 00:54

## 2024-12-10 RX ADMIN — FINASTERIDE 5 MG: 5 TABLET, FILM COATED ORAL at 21:37

## 2024-12-10 RX ADMIN — DOCUSATE SODIUM 100 MG: 100 CAPSULE, LIQUID FILLED ORAL at 21:36

## 2024-12-10 RX ADMIN — TRAMADOL HYDROCHLORIDE 100 MG: 50 TABLET ORAL at 08:47

## 2024-12-10 RX ADMIN — FAMOTIDINE 20 MG: 20 TABLET, FILM COATED ORAL at 21:37

## 2024-12-10 RX ADMIN — POLYETHYLENE GLYCOL 3350 17 G: 17 POWDER, FOR SOLUTION ORAL at 08:47

## 2024-12-10 RX ADMIN — Medication 4.8 MG: at 21:43

## 2024-12-10 RX ADMIN — SENNOSIDES 17.2 MG: 8.6 TABLET ORAL at 21:36

## 2024-12-10 RX ADMIN — TAMSULOSIN HYDROCHLORIDE 0.4 MG: 0.4 CAPSULE ORAL at 21:37

## 2024-12-10 RX ADMIN — FAMOTIDINE 20 MG: 20 TABLET, FILM COATED ORAL at 08:47

## 2024-12-10 RX ADMIN — SODIUM CHLORIDE, PRESERVATIVE FREE 10 ML: 5 INJECTION INTRAVENOUS at 21:37

## 2024-12-10 RX ADMIN — ATORVASTATIN CALCIUM 40 MG: 40 TABLET, FILM COATED ORAL at 21:37

## 2024-12-10 ASSESSMENT — PAIN DESCRIPTION - DESCRIPTORS
DESCRIPTORS: ACHING
DESCRIPTORS: ACHING
DESCRIPTORS: OTHER (COMMENT)

## 2024-12-10 ASSESSMENT — PAIN - FUNCTIONAL ASSESSMENT
PAIN_FUNCTIONAL_ASSESSMENT: ACTIVITIES ARE NOT PREVENTED

## 2024-12-10 ASSESSMENT — PAIN SCALES - GENERAL
PAINLEVEL_OUTOF10: 7
PAINLEVEL_OUTOF10: 4
PAINLEVEL_OUTOF10: 0
PAINLEVEL_OUTOF10: 7
PAINLEVEL_OUTOF10: 0
PAINLEVEL_OUTOF10: 8

## 2024-12-10 ASSESSMENT — PAIN DESCRIPTION - LOCATION
LOCATION: HIP
LOCATION: HIP
LOCATION: THROAT
LOCATION: HIP

## 2024-12-10 ASSESSMENT — PAIN DESCRIPTION - PAIN TYPE: TYPE: ACUTE PAIN

## 2024-12-10 ASSESSMENT — PAIN DESCRIPTION - ONSET: ONSET: ON-GOING

## 2024-12-10 ASSESSMENT — PAIN DESCRIPTION - ORIENTATION
ORIENTATION: LEFT
ORIENTATION: LEFT

## 2024-12-10 ASSESSMENT — PAIN DESCRIPTION - FREQUENCY: FREQUENCY: INTERMITTENT

## 2024-12-10 ASSESSMENT — PAIN SCALES - WONG BAKER: WONGBAKER_NUMERICALRESPONSE: NO HURT

## 2024-12-10 NOTE — PLAN OF CARE
Problem: Discharge Planning  Goal: Discharge to home or other facility with appropriate resources  Outcome: Progressing  Flowsheets (Taken 12/10/2024 1615)  Discharge to home or other facility with appropriate resources: Identify barriers to discharge with patient and caregiver     Problem: Safety - Adult  Goal: Free from fall injury  Outcome: Progressing  Flowsheets (Taken 12/10/2024 1615)  Free From Fall Injury: Instruct family/caregiver on patient safety     Problem: ABCDS Injury Assessment  Goal: Absence of physical injury  Outcome: Progressing  Flowsheets (Taken 12/10/2024 1615)  Absence of Physical Injury: Implement safety measures based on patient assessment     Problem: Pain  Goal: Verbalizes/displays adequate comfort level or baseline comfort level  Outcome: Progressing  Flowsheets (Taken 12/10/2024 1615)  Verbalizes/displays adequate comfort level or baseline comfort level: Encourage patient to monitor pain and request assistance     Problem: Skin/Tissue Integrity  Goal: Absence of new skin breakdown  Description: 1.  Monitor for areas of redness and/or skin breakdown  2.  Assess vascular access sites hourly  3.  Every 4-6 hours minimum:  Change oxygen saturation probe site  4.  Every 4-6 hours:  If on nasal continuous positive airway pressure, respiratory therapy assess nares and determine need for appliance change or resting period.  Outcome: Progressing     Problem: Chronic Conditions and Co-morbidities  Goal: Patient's chronic conditions and co-morbidity symptoms are monitored and maintained or improved  Outcome: Progressing  Flowsheets (Taken 12/10/2024 1615)  Care Plan - Patient's Chronic Conditions and Co-Morbidity Symptoms are Monitored and Maintained or Improved: Monitor and assess patient's chronic conditions and comorbid symptoms for stability, deterioration, or improvement

## 2024-12-10 NOTE — CARE COORDINATION
Patient educated on how to use incentive spirometer. Patient verbalized understanding and demonstrated proper use. Emphasized importance and usage of device, with coughing and deep breathing every 2 hours while awake. Patient calm and cooperative. Family at bedside during shift. Patient measured for FRANCOISE hose. Discussed purpose of FRANCOISE hose during the day.

## 2024-12-10 NOTE — PROGRESS NOTES
Brief Intervention and Referral to Treatment Summary    Patient was provided PHQ-9, AUDIT-C and DAST Screening:      PHQ-9 Score: 0  AUDIT-C Score:  3  DAST Score:  0    Patient’s substance use is considered     Low Risk/Healthy      Patient’s depression is considered:     Minimal    Brief Education Was Provided    Patient was not receptive      Brief Intervention Is Provided (Only for AUDIT-C or DAST)     Patient reports readiness to decrease and/or stop use and a plan was discussed   Patient denies readiness to decrease and/or stop use and a plan was not discussed      Injured Trauma Survivor Screening  1.  When you were injured or right afterward   Did you think you were going to die? RESPONSES; YES+1/NO: NO  Do you think this was done to you intentionally? NO    Since your injury  Have you felt more restless, tense or jumpy than usual? RESPONSES; YES+1/NO: NO  Have you found yourself unable to stop worrying? RESPONSES; YES+1/NO: NO  Do you find yourself thinking that the world is unsafe and that people are not to be trusted? RESPONSES; YES+1/NO: NO    TOTAL SCORE from ITSS Questions 1 and 2: 0  NOTE: A score of greater than or equal to 2 is considered positive for PTSD risk and is to receive a community resource packet to link with appropriate providers.    Recommendations/Referrals for Brief and/or Specialized Treatment Provided to Patient:    
[facility]  Acute Inpatient Rehab Preadmission Assessment    Patient Name: Gonzales Sheehan        Ethnicity:Not of , /a, or [place]ian origin  Race:White  MRN: [id number]    : 1940  (84 y.o.)  Gender: male     Admitted from:[facility]  Initial Assessment    Date of admission to the hospital: 2024  1:36 AM  Date patient eligible for admission:12/10/2024    Primary Diagnosis: Multiple closed pelvic fractures with disruption of pelvic [place]       Did patient have surgery?  no    Physicians: Schuyelr Roman MD, Dr. Arreola, Dr. Rendon, Dr. Ramirez    Risk for clinical complications/co-morbidities:   Past Medical History:   Diagnosis Date    AMD (age-related macular degeneration), wet ([facility])     Bradycardia     Enlarged prostate     H/O cardiovascular stress test 2018    Equivocal myocardial perfusion study. There is a small/moderate perfusion defect of mild/moderate intensity in the inferolateral and inferior regions during stress imaging which is most consistent with ischemia but may be due to artifact. Overall, thses results are most consistent with a low risk for CAD    H/O echocardiogram 2018    EF 55-60%. The left ventiruclar wall thickness is mildly incrased. Aortic valve calcification without significant stenosis or regurgirtation. Evidence of mild (grade I) diastolic dysfucntion is seen.     History of Holter monitoring 2018    Atrial fibrillation for 51% of the test duration with heart rates ranging from 44 to 127 bpm with an average HR of 65 bpm. No symptoms were reported.     Hyperlipidemia     Other and unspecified hyperlipidemia     PAF (paroxysmal atrial fibrillation) ([facility])     Dr. Lennon    Psychosexual dysfunction with inhibited sexual excitement     Unspecified hyperplasia of prostate without urinary obstruction and other lower urinary tract symptoms (LUTS)      Financial Information  Primary insurance: Medicare    Secondary 
[facility]  INPATIENT PHYSICAL THERAPY  EVALUATION  [facility] ORTHOPEDICS 7K - 7K-05/005-A    Discharge Recommendations: Continue to assess pending progress, IP Rehab, Patient able to tolerate 3 hours of therapy per day, Therapy recommended at discharge  Equipment Recommendations:    monitor for needs             Time In: 1620  Time Out: 1654  Timed Code Treatment Minutes: 25 Minutes  Minutes: 34          Date: 2024  Patient Name: Gonzales hSeehan,  Gender:  male        MRN: [id number]  : 1940  (84 y.o.)      Referring Practitioner: Alyssa Bills PA-C  Diagnosis: Multiple closed pelvic fractures with disruption of pelvic [place], sequela  Additional Pertinent Hx: Per Trauma HPI, \"84 y.o. male presenting at [facility] by activation of level 3 trauma, brought by EMS as transfer from [facility] following a fall downstairs with no reported LOC. Per patient, he got up to use the restroom around 1:30 AM on Saturday night when he lost his balance.  Patient had a nonsyncopal fall and fell down the stairs headfirst on his back.  Denies any head strike or head trauma.  Wife was able to assist him and reportedly was moving him around the house on his sheets.  Son arrived to the house on  afternoon and patient attempted to ambulate with a walker and was unable to.  Was taken to ER for evaluation and found to have multiple pelvic fractures with associated hemorrhage and spinal fractures.  He does take his Eliquis for A-fib.  C/O left-sided pelvic pain and groin pain.  Rated 7/10.  Also C/oh some back pain and tightness.  Pain is described as achy and symptoms are worsened by movement and palpation, rolling in the bed. Patient denies chest pain, shortness of breath, cough, headache, dizziness, lightheadedness, numbness, paraesthesias, weakness, chills, fevers, abdominal pain, nausea, vomiting, neck pain.  Denies saddle anesthesia.  Brief PMH reviewed, pt with HLD, enlarged prostate, A-fib, 
[facility]  INPATIENT REHABILITATION  ADMISSIONS COORDINATOR CONSULT    Referral Type: internal    Patient Name: Gonzales Sheehan      MRN: [id number]    : 1940  (84 y.o.)  Gender: male   Race:White (non-)     Payor Source: Payor: MEDICARE / Plan: MEDICARE PART A AND B / Product Type: *No Product type* /   Secondary Payor Source:  AETNA    Isolation Status: No active isolations    Lives With: Spouse  Type of Home: House  Home Layout: Two level, Bed/Bath upstairs  Home Access: Stairs to enter with rails  Entrance Stairs - Number of Steps: 3 steps with a post     What is treatment plan?  Await orthospine consult completion.   Disciplines Required upon Admission to Inpatient Rehabilitation: Physical Therapy and Occupational Therapy  Post operative: No  Fall: Yes  Dialysis: No  Diet: ADULT DIET; Regular  Discussed patient with  and PM&R provider: Patient is appropriate for IPR.  Rounded on unit, spoke and daughter at bedside. Explained rehab philosophy, length of stay, and Medicare benefits.    Patient agreeable for admission to IPR when medically cleared.    There is no plan for change of insurance at beginning of year.  LESLY Montoya updated, Primary RN Nitin updated.      
Orthopaedic Progress Note      SUBJECTIVE   Mr. Sheehan is hospital day 2, closed treatment pelvic and sacral fx    Seen at bedside this morning  no adverse events overnight  Mobilization efforts goodyesterday, up to chair  Pain control stabilizing   Family bedside       OBJECTIVE      Physical    VITALS:  /61   Pulse 61   Temp 97.7 °F (36.5 °C) (Oral)   Resp 16   Ht 1.88 m (6' 2.02\")   Wt 92 kg (202 lb 13.2 oz)   SpO2 91%   BMI 26.03 kg/m²   I/O last 3 completed shifts:  In: 560 [P.O.:560]  Out: 400 [Urine:400]    5/10 pain  Gen: alert and oriented  Head: normorcephalic, atraumatic  Resp: unlabored, room air  Pelvis: stable  BLE:- atraumatic hip, knee, ankle, no lacerations or lesions. Sitting in neutral alignment. Compartments soft.  Nontender to palpation asis iliac crest, TTP L SI, nontender R SI, some tenderness L greater troch, nontender to R greater troc, nontender medial lateral joint line, tibia shaft, medial lateral mal, midfoot, calc, calf supple nontender,  Able to perform SLR, gastroc ta ehl intact, painless IR ER through hip. sensation to light touch intact, feet warm well perfused     Data  CBC:   Lab Results   Component Value Date/Time    WBC 9.6 12/10/2024 05:10 AM    HGB 10.2 12/10/2024 05:10 AM     12/10/2024 05:10 AM     08/24/2011 08:45 PM     BMP:    Lab Results   Component Value Date/Time     12/10/2024 05:10 AM    K 4.2 12/10/2024 05:10 AM     12/10/2024 05:10 AM    CO2 21 12/10/2024 05:10 AM    BUN 18 12/10/2024 05:10 AM    CREATININE 0.9 12/10/2024 05:10 AM    CALCIUM 8.6 12/10/2024 05:10 AM    GLUCOSE 118 12/10/2024 05:10 AM    GLUCOSE 96 01/19/2012 09:19 AM     Uric Acid:  No components found for: \"URIC\"  PT/INR:    Lab Results   Component Value Date/Time    PROTIME 15.0 12/08/2024 06:55 PM    INR 1.2 12/08/2024 06:55 PM     Troponin:    Lab Results   Component Value Date/Time    TROPONINI <0.01 08/24/2011 08:45 PM     Urine Culture:  No components 
Patient educated on how to use incentive spirometer. Patient verbalized understanding and demonstrated proper use. Emphasized importance and usage of device, with coughing and deep breathing every 4 hours while awake.        
Patient received sacramental anointing by a . If you are in need of  support, please call [phone]. If you are in need of a  after 6:30pm, please call the house supervisor for the on-call .      Roya Ibrahim  [facility] Health Coordinator  [phone]   
Pt admitted to  7K5 from ED.     Complaints: left hip pain.      IV none infusing into the antecubital right, condition patent and no redness and left, condition patent, no redness,. IV site free of s/s of infection or infiltration. Vital signs obtained. Assessment and data collection initiated.     Two nurse skin assessment performed by Nitza GOMES and Apolonia CONWAY. Oriented to room.     Explained patients right to have family, representative or physician notified of their admission.  Patient has Declined for physician to be notified.  Patient has Declined for family/representative to be notified.    The patient is interested in [facility]’s meds to beds program?:  Yes    Policies and procedures for 7 explained. All questions answered with no further questions at this time. Fall prevention and safety brochure discussed with patient.  Bed alarm on. Call light in reach.      
[facility]  Trauma Surgery - Dr. Schuyler Roman  Daily Progress Note  Pt Name: Gonzales Sheehan  Medical Record Number: [id number]  Date of Birth 1940   Today's Date: 12/10/2024    HD: # 1    CC:  \"pain is manageable\"    ASSESSMENT  1.  Active Hospital Problems    Diagnosis Date Noted    Multiple closed pelvic fractures with disruption of pelvic [place], sequela [S32.810S] 12/09/2024    Fall down stairs [W10.8XXA] 12/09/2024    Pelvic hematoma, male [N50.1] 12/09/2024    Closed fracture of left side of symphysis pubis with diastasis with routine healing [S32.592D] 12/09/2024    Lumbar transverse process fracture, closed, initial encounter (HCC) [S32.009A] 12/09/2024    Closed fracture of sacrum, initial encounter (HCC) [S32.10XA] 12/08/2024    Paroxysmal atrial fibrillation (HCC) [I48.0] 09/21/2018    Enlarged prostate [N40.0] 02/02/2012         PLAN  Patient admitted under Trauma Services (w/ tele)     Trauma by fall              - Fall precautions               - PT/OT eval and treat      Multiple pelvic/ pubic fractures with associated hemorrhage               - CT AP at outside facility, completed without contrast reads acute traumatic comminuted fracture of the left sacral ala with up to 8 mm of displacement at the fracture site. There is associated presacral hemorrhage and left pelvic sidewall hemorrhage. Some of the hemorrhage tracks into the left retroperitoneum without discrete measurable hematoma. Acute traumatic avulsion fracture of the left pubic symphysis with diastasis of the pubic symphysis measuring approximately 2.3 cm. There is associated hemorrhage involving the pubic symphysis diastasis. There is increased thickening and hemorrhage associated with the left adductor musculature as well as small amount hemorrhage in the left pelvic sidewall.              - Transferred with binder in place              - H&H at OSH 12.2/35.6  --> 11.5/34.3, repeat now 11.9/35.7 and appears stable 
Function:  Lives With: Spouse  Type of Home: House  Home Layout: Two level, Bed/Bath upstairs  Home Access: Stairs to enter with rails  Entrance Stairs - Number of Steps: 3 steps with a post  Home Equipment: Walker - Rolling   Bathroom Shower/Tub: Walk-in shower  Bathroom Toilet: Standard  Bathroom Equipment: Grab bars in shower  Bathroom Accessibility: Accessible    Prior Level of Assist for ADLs: Independent  Prior Level of Assist for Homemaking: Independent  Homemaking Responsibilities: Yes  Prior Level of Assist for Transfers: Independent  Active : Yes  Prior Level of Assist for Ambulation: Independent community ambulator, with or without device  Has the patient had two or more falls in the past year or any fall with injury in the past year?: Yes    Restrictions/Precautions:  Restrictions/Precautions: Weight Bearing, General Precautions, Fall Risk  Right Lower Extremity Weight Bearing: Weight Bearing As Tolerated  Left Lower Extremity Weight Bearing: Weight Bearing As Tolerated  Position Activity Restriction  Spinal Precautions: No Bending, No Twisting, No Lifting     SUBJECTIVE: RN reports pt to be going to IPR soon and recommends pt return to bed.     PAIN: 6/10: pelvis and L groin    Vitals: Vitals not assessed per clinical judgement, see nursing flowsheet    OBJECTIVE:  Bed Mobility:  Sit to Supine: Moderate Assistance, X 1, with head of bed flat, with rail, with verbal cues      Transfers:  Sit to Stand: Minimal Assistance, Moderate Assistance, cues for hand placement, with verbal cues for breathing, from chair with arms  Stand to Sit:Minimal Assistance, cues for hand placement    Ambulation:  Minimal Assistance, with verbal cues   Distance: 3 ft  Surface: Level Tile  Device: Rolling Walker  Gait Deviations: Forward Flexed Posture, Decreased Step Length Bilaterally, Decreased Weight Shift Left, Decreased Gait Speed, and Decreased Heel Strike Bilaterally     Stairs:  Not Tested    Balance:  Static 
decrease safety awareness, decreased endurance. Pt requires skilled OT intervention to increase indep and safety with all self cares, transfers, mobility, and IADLs to return to [facility]. Without skilled OT intervention patient is at increased risk for falls, caregiver burden, and hospital readmission after discharge. Pt would benefit from IPR at discharge.     Performance deficits / Impairments: Decreased functional mobility , Decreased safe awareness, Decreased ADL status, Decreased endurance, Decreased strength, Decreased balance, Decreased high-level IADLs  Prognosis: Good  REQUIRES OT FOLLOW-UP: Yes  Decision Making: Medium Complexity    Treatment Initiated: Treatment and education initiated within context of evaluation.  Evaluation time included review of current medical information, gathering information related to past medical, social and functional history, completion of standardized testing, formal and informal observation of tasks, assessment of data and development of plan of care and goals.  Treatment time included skilled education and facilitation of tasks to increase safety and independence with ADL's for improved functional independence and quality of life.    Patient Education:          Patient Education  Education Given To: Patient  Education Provided: Role of Therapy;Plan of Care;Precautions;ADL Adaptive Strategies;Transfer Training  Education Method: Demonstration;Verbal  Education Outcome: Verbalized understanding;Continued education needed    Plan:  Times Per Week: 6x  Times Per Day: Once a day  Current Treatment Recommendations: Strengthening, Balance training, Endurance training, Patient/Caregiver education & training, Self-Care / ADL, Home management training, Equipment evaluation, education, & procurement, Safety education & training, Functional mobility training.  See long-term goal time frame for expected duration of plan of care.  If no long-term goals established, a short length of stay is

## 2024-12-10 NOTE — CARE COORDINATION
12/10/24, 9:37 AM EST    Patient goals/plan/ treatment preferences discussed by  and .  Patient goals/plan/ treatment preferences reviewed with patient/ family.  Patient/ family verbalize understanding of discharge plan and are in agreement with goal/plan/treatment preferences.  Understanding was demonstrated using the teach back method.  AVS provided by RN at time of discharge, which includes all necessary medical information pertaining to the patients current course of illness, treatment, post-discharge goals of care, and treatment preferences.     Services At/After Discharge: Inpatient rehab       Gonzales is discharging to  bed 4 today.    1008 addendum- await ortho spine encounter prior to admission to .  
12/10/24, 9:59 AM EST    DISCHARGE PLANNING EVALUATION    Planning IPR at discharge  
for further evaluation.   6. No findings of active contrast extravasation.   Patient Goals/Plan/Treatment Preferences: Spoke with Gonzales and his son Kofi; he resides home with spouse in [place]. Family live close-by, has RW, normally independent pta. Await therapy recs and pt plans home with family with HH or OP PT.            12/09/24 1114   Service Assessment   Patient Orientation Alert and Oriented   Cognition Alert   History Provided By Patient   Primary Caregiver Self   Accompanied By/Relationship son Kofi   Support Systems Spouse/Significant Other;Children;Family Members   Patient's Healthcare Decision Maker is: Legal Next of Kin   PCP Verified by CM Yes   Last Visit to PCP Within last year   Prior Functional Level Independent in ADLs/IADLs   Current Functional Level Assistance with the following:;Bathing;Housework;Shopping;Mobility   Can patient return to prior living arrangement Unknown at present   Ability to make needs known: Good   Family able to assist with home care needs: Yes   Would you like for me to discuss the discharge plan with any other family members/significant others, and if so, who? No   Financial Resources Medicare   Community Resources None   CM/SW Referral DME   Social/Functional History   Lives With Spouse   Type of Home House   Home Layout Two level   Active  Yes   Discharge Planning   Type of Residence House   Living Arrangements Spouse/Significant Other   Current Services Prior To Admission Durable Medical Equipment   Current DME Prior to Arrival Cane;Walker  (has RW)   Potential Assistance Needed Home Care   DME Ordered? No   Potential Assistance Purchasing Medications No   Type of Home Care Services None   Patient expects to be discharged to: House   Follow Up Appointment: Best Day/Time  Wednesday AM   One/Two Story Residence Two story   Lift Chair Available No   History of falls? 1   Services At/After Discharge   Transition of Care Consult (CM Consult) Home Health   Services

## 2024-12-10 NOTE — DISCHARGE INSTR - DIET

## 2024-12-10 NOTE — PROGRESS NOTES
Admitted to the Inpatient Rehabilitation Unit via bed.  Patient was then oriented to room and unit.  Education provided on the rehabilitation routine: three hours of therapy five days per week.      Explained patients right to have family, representative or physician notified of their admission.  Patient has Declined for physician to be notified.  Patient has Declined for family/representative to be notified.    Admitting medication orders compared with acute stay medications; home medication list reviewed with patient/family.  Medication issues identified No      Vaccination Status  Have you ever received a COVID-19 vaccine? Yes  date of last vaccine: 11/10/2021, Brand: Moderna      Transportation:   Has transportation kept you from medical appointments, meetings, work, or from getting things needed for daily living? (Check all that apply)  No.      Health Literacy:   How often do you need to have someone help you when you read instructions, pamphlets, or other written material from your doctor or pharmacy?  0. - Never    Social Isolation:  How often do you feel lonely or isolated from those around you?  0. Never    Patient Mood Interview (PHQ-2 to 9) (from [employer]©)   Say to Patient: \"Over the last 2 weeks, have you been bothered by any of the following problems?\"   If symptom is present, enter yes in column 1 (Symptom Presence)  If yes in column 1, then ask the patient: “About how often have you been bothered by this?” Indicate response in column 2, Symptom Frequency.   Symptom Presence  No    Yes   9.    No response  Symptom Frequency  Never or 1 day  2-6 days (several days)  7-11 days (half or more of the days)  12-14 days (nearly every day)    Symptom Presence Symptom Frequency   Little interest or pleasure in doing things 0. No 0. Never or 1 day   Feeling down, depressed, or hopeless 0. No 0. Never or 1 day   If either A or B above has symptom frequency coded 2 or 3, CONTINUE asking questions below.

## 2024-12-10 NOTE — DISCHARGE INSTR - COC
Continuity of Care Form    Patient Name: Gonzales Sheehan   :  1940  MRN:  [id number]    Admit date:  2024  Discharge date:  ***    Code Status Order: Full Code   Advance Directives:   Advance Care Flowsheet Documentation             Admitting Physician:  Schuyler Roman MD  PCP: Darrian Edwards, APRN - CNP    Discharging Nurse: ***  Discharging Hospital Unit/Room#: 7K-05/005-A  Discharging Unit Phone Number: ***    Emergency Contact:   Extended Emergency Contact Information  Primary Emergency Contact: Rosey Sheehan  Address: [address] [place] [address]  Home Phone: [phone]  Relation: Spouse  Hearing or visual needs: None  Other needs: None  Preferred language: English   needed? No    Past Surgical History:  Past Surgical History:   Procedure Laterality Date    CYSTOURETHROSCOPY  05    HERNIA REPAIR      inguinal    PROSTATE SURGERY  11,05    TRUS-P       Immunization History:   Immunization History   Administered Date(s) Administered    COVID-19, MODERNA BLUE border, Primary or Immunocompromised, (age 12y+), IM, 100 mcg/0.5mL 2021, 2021, 11/10/2021    Influenza Virus Vaccine 2011, 2012    Influenza, FLUZONE High Dose (age 65 y+), IM, Quadv, 0.7mL 2020, 2022    Influenza, FLUZONE High Dose, (age 65 y+), IM, Trivalent PF, 0.5mL 11/15/2021    Pneumococcal, PCV-13, PREVNAR 13, (age 6w+), IM, 0.5mL 2019    Pneumococcal, PPSV23, PNEUMOVAX 23, (age 2y+), SC/IM, 0.5mL 2003    Zoster Recombinant (Shingrix) 11/15/2021       Active Problems:  Patient Active Problem List   Diagnosis Code    Enlarged prostate N40.0    Elevated PSA R97.20    BPH (benign prostatic hyperplasia) N40.0    Unspecified hyperplasia of prostate without urinary obstruction and other lower urinary tract symptoms (LUTS) N40.0    Psychosexual dysfunction with inhibited sexual excitement F52.8    Hyperlipidemia E78.5

## 2024-12-10 NOTE — CONSULTS
symphysis fracture with diastasis after a fall down stairs Saturday evening. His imaging was reviewed and we discussed his fractures, their alignment, and different management options for his injury. I recommend closed treatment. The risks, benefits, alternatives to closed treatment of the fractures were discussed at length, specifically nonunion and malunion. Natural history also discussed. He was in agreement to move forward with closed treatment. His family bedside was agreeable as well.         PLAN:    -closed treatment sacral and pelvic fracture  -WBAT BLE with walker  -advance diet today  -closely monitor hgb hct  -hold eliquis today if able, can resume as early as tomorrow from ortho standpoint pending lab trend and trauma preference  -no indication for binder  -PT OT as appropriate  -multimodal pain control   -follow up 2-3 weeks Dr Ramirez for repeat imaging    Patient history physical and plan were reviewed with Dr Ramirez, he was in agreement with the plan.         
no acute abnormality. SOFT TISSUES/SKULL:  No acute abnormality of the visualized skull or soft tissues. CERVICAL SPINE BONES/ALIGNMENT: There is no acute fracture or traumatic malalignment. DEGENERATIVE CHANGES: Moderate degenerative changes. SOFT TISSUES: There is no prevertebral soft tissue swelling.   No acute intracranial abnormality. Small bilateral low-density convexity subdural collections measuring 5 mm on the right and 3 mm on the left.  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 No acute fracture in the cervical spine.         Impression:  Trauma by fall  Multiple pelvic/ pubic fractures with associated hemorrhage; left sacral ala, comminuted and mildly displaced. Pubic symphyseal diastasis  Pelvic hemorrhage and hematoma, complicated by anticoagulant   Lumbar and Sacral spine fractures; Nondisplaced left L5 and L4 transverse process fractures. Fracture of the superior S5 vertebral body, nondisplaced.  Elevated CK   Bradycardia  enlarged prostate  A-fib  HLD    Recommendations:  Continue current therapies, await OT evaluation  There is mention of orthospine consult for vertebral fractures, discussed further with trauma NP, whom reached out to orthospine, no intervention planned, no need for bracing, okay with PT OT.  Increase senna to 2 tabs nightly, added Colace twice daily.  Continue MiraLAX daily.  Plan admission to IPR today.  Orders completed.  Trauma NP updated    It was my pleasure to evaluate Gonzales Sheehan today.  Please call with questions.    Monserrat Hanson, ROSIE - CNP       
Implemented All Universal Safety Interventions:  [place] to call system. Call bell, personal items and telephone within reach. Instruct patient to call for assistance. Room bathroom lighting operational. Non-slip footwear when patient is off stretcher. Physically safe environment: no spills, clutter or unnecessary equipment. Stretcher in lowest position, wheels locked, appropriate side rails in place.

## 2024-12-10 NOTE — PLAN OF CARE
Problem: Discharge Planning  Goal: Discharge to home or other facility with appropriate resources  12/9/2024 2327 by Puja Mackey, RN  Outcome: Progressing  Flowsheets (Taken 12/9/2024 2327)  Discharge to home or other facility with appropriate resources:   Identify discharge learning needs (meds, wound care, etc)   Identify barriers to discharge with patient and caregiver   Refer to discharge planning if patient needs post-hospital services based on physician order or complex needs related to functional status, cognitive ability or social support system     Problem: Pain  Goal: Verbalizes/displays adequate comfort level or baseline comfort level  12/9/2024 2327 by Puja Mackey, RN  Outcome: Progressing  Flowsheets (Taken 12/9/2024 2327)  Verbalizes/displays adequate comfort level or baseline comfort level:   Encourage patient to monitor pain and request assistance   Administer analgesics based on type and severity of pain and evaluate response   Consider cultural and social influences on pain and pain management   Implement non-pharmacological measures as appropriate and evaluate response   Assess pain using appropriate pain scale     Problem: ABCDS Injury Assessment  Goal: Absence of physical injury  12/9/2024 2327 by Puja Mackey, RN  Outcome: Progressing  Flowsheets (Taken 12/9/2024 1136 by Nitza Morejon, RN)  Absence of Physical Injury: Implement safety measures based on patient assessment     Problem: Safety - Adult  Goal: Free from fall injury  12/9/2024 2327 by Puja Mackey, RN  Outcome: Progressing  Flowsheets (Taken 12/9/2024 1136 by Nitza Morejon, RN)  Free From Fall Injury: Instruct family/caregiver on patient safety     Problem: Skin/Tissue Integrity - Adult  Goal: Skin integrity remains intact  12/9/2024 2327 by Puja Mackey, RN  Outcome: Progressing  Flowsheets (Taken 12/9/2024 2327)  Skin Integrity Remains Intact:   Monitor for areas of redness and/or skin breakdown

## 2024-12-10 NOTE — DISCHARGE SUMMARY
Discharge Summary   Trauma Services    Patient Identification:  Gonzales Sheehan  : 1940  MRN: [id number]   Account: [id number]     Admit date: 12/10/2024  Discharge date: 12/10/24  Attending provider: Priti Arreola DO        Primary care provider: Darrian Edwards APRN - CNP     Discharge Diagnoses:   Principal Problem:    Fall at home, initial encounter  Resolved Problems:    * No resolved hospital problems. *       Hospital Course:   Gonzales Sheehan is a 84 y.o. male admitted to [facility] on 12/10/2024 for multiple pelvic fractures, pelvis hematoma, and L4/L5 transverse process fractures following a fall down stairs with NO loss of consciousness. Report stated he initially presented to [facility] after getting up to use the bathroom in the middle of the night. He lost his balance and fell down the stairs headfirst on his back. He was able to get up with his wife's assistance and was ambulatory at home. Later the next day he had difficulty ambulating with a walker and he was taken to the ER for evaluation. He was transferred to [facility] for further evaluation.     Admitted under trauma services to med surg.     Inpatient management included as follows:  Patient was admitted for pain control and evaluation by orthopedic and spine specialties. Injuries were determined to be non-operative and did not require any bracing or further work up. He was evaluated by PT/OT and found to be appropriate for IPR.     Patient discharged with instructions for pelvic fractures, hematoma, and transverse process fractures. Patient agreeable to discharge location and verbalized understanding of discharge instructions, prescription precautions, and symptoms indicating return for treatment. Patient given opportunity to ask questions, all inquiries answered. Care and discharge disposition in coordination with consulting providers and trauma surgeon Dr. Roman.    Discharge Medications:     Medication  Alert/Awake

## 2024-12-10 NOTE — PLAN OF CARE
Problem: Discharge Planning  Goal: Discharge to home or other facility with appropriate resources  12/10/2024 1202 by Nitin Hernandez RN  Outcome: Completed  Flowsheets (Taken 12/10/2024 1202)  Discharge to home or other facility with appropriate resources:   Identify barriers to discharge with patient and caregiver   Arrange for needed discharge resources and transportation as appropriate     Problem: Pain  Goal: Verbalizes/displays adequate comfort level or baseline comfort level  12/10/2024 1202 by Nitin Hernandez RN  Outcome: Completed  Flowsheets (Taken 12/10/2024 1202)  Verbalizes/displays adequate comfort level or baseline comfort level: Encourage patient to monitor pain and request assistance     Problem: ABCDS Injury Assessment  Goal: Absence of physical injury  12/10/2024 1202 by Nitin Hernandez RN  Outcome: Completed  Flowsheets (Taken 12/10/2024 1202)  Absence of Physical Injury: Implement safety measures based on patient assessment     Problem: Skin/Tissue Integrity - Adult  Goal: Skin integrity remains intact  12/10/2024 1202 by Nitin Hernandez RN  Outcome: Completed  Flowsheets  Taken 12/10/2024 1202 by Nitin Hernandez RN  Skin Integrity Remains Intact: Monitor for areas of redness and/or skin breakdown  Taken 12/10/2024 0925 by Nitin Hernandez RN  Skin Integrity Remains Intact: Monitor for areas of redness and/or skin breakdown  Taken 12/9/2024 2329 by Puja Mackey RN  Skin Integrity Remains Intact: Assess vascular access sites hourly     Problem: Gastrointestinal - Adult  Goal: Maintains or returns to baseline bowel function  12/10/2024 1202 by Nitin Hernandez RN  Outcome: Completed     Problem: Metabolic/Fluid and Electrolytes - Adult  Goal: Electrolytes maintained within normal limits  12/10/2024 1202 by Nitin Hernandez RN  Outcome: Completed   Care plan reviewed with patient and patient verbalized understanding of the plan of care and contribute to goal setting.

## 2024-12-11 LAB
25(OH)D3 SERPL-MCNC: 20 NG/ML (ref 30–100)
ALBUMIN SERPL BCG-MCNC: 3 G/DL (ref 3.5–5.1)
ALP SERPL-CCNC: 77 U/L (ref 38–126)
ALT SERPL W/O P-5'-P-CCNC: 17 U/L (ref 11–66)
ANION GAP SERPL CALC-SCNC: 10 MEQ/L (ref 8–16)
AST SERPL-CCNC: 26 U/L (ref 5–40)
BASOPHILS ABSOLUTE: 0 THOU/MM3 (ref 0–0.1)
BASOPHILS NFR BLD AUTO: 0.4 %
BILIRUB CONJ SERPL-MCNC: 0.2 MG/DL (ref 0.1–13.8)
BILIRUB SERPL-MCNC: 0.8 MG/DL (ref 0.3–1.2)
BUN SERPL-MCNC: 16 MG/DL (ref 7–22)
CALCIUM SERPL-MCNC: 8.4 MG/DL (ref 8.5–10.5)
CHLORIDE SERPL-SCNC: 104 MEQ/L (ref 98–111)
CO2 SERPL-SCNC: 22 MEQ/L (ref 23–33)
CREAT SERPL-MCNC: 0.9 MG/DL (ref 0.4–1.2)
DEPRECATED RDW RBC AUTO: 45.2 FL (ref 35–45)
EOSINOPHIL NFR BLD AUTO: 5.5 %
EOSINOPHILS ABSOLUTE: 0.5 THOU/MM3 (ref 0–0.4)
ERYTHROCYTE [DISTWIDTH] IN BLOOD BY AUTOMATED COUNT: 13.2 % (ref 11.5–14.5)
GFR SERPL CREATININE-BSD FRML MDRD: 83 ML/MIN/1.73M2
GLUCOSE SERPL-MCNC: 105 MG/DL (ref 70–108)
HCT VFR BLD AUTO: 30.1 % (ref 42–52)
HGB BLD-MCNC: 10.2 GM/DL (ref 14–18)
IMM GRANULOCYTES # BLD AUTO: 0.06 THOU/MM3 (ref 0–0.07)
IMM GRANULOCYTES NFR BLD AUTO: 0.6 %
LYMPHOCYTES ABSOLUTE: 1.4 THOU/MM3 (ref 1–4.8)
LYMPHOCYTES NFR BLD AUTO: 14.6 %
MAGNESIUM SERPL-MCNC: 1.7 MG/DL (ref 1.6–2.4)
MCH RBC QN AUTO: 31.6 PG (ref 26–33)
MCHC RBC AUTO-ENTMCNC: 33.9 GM/DL (ref 32.2–35.5)
MCV RBC AUTO: 93.2 FL (ref 80–94)
MONOCYTES ABSOLUTE: 1 THOU/MM3 (ref 0.4–1.3)
MONOCYTES NFR BLD AUTO: 10.9 %
NEUTROPHILS ABSOLUTE: 6.3 THOU/MM3 (ref 1.8–7.7)
NEUTROPHILS NFR BLD AUTO: 68 %
NRBC BLD AUTO-RTO: 0 /100 WBC
PHOSPHATE SERPL-MCNC: 2.8 MG/DL (ref 2.4–4.7)
PLATELET # BLD AUTO: 178 THOU/MM3 (ref 130–400)
PMV BLD AUTO: 8.8 FL (ref 9.4–12.4)
POTASSIUM SERPL-SCNC: 4.2 MEQ/L (ref 3.5–5.2)
PREALB SERPL-MCNC: 10 MG/DL (ref 20–40)
PROT SERPL-MCNC: 5.8 G/DL (ref 6.1–8)
RBC # BLD AUTO: 3.23 MILL/MM3 (ref 4.7–6.1)
SODIUM SERPL-SCNC: 136 MEQ/L (ref 135–145)
WBC # BLD AUTO: 9.3 THOU/MM3 (ref 4.8–10.8)

## 2024-12-11 PROCEDURE — 99222 1ST HOSP IP/OBS MODERATE 55: CPT | Performed by: STUDENT IN AN ORGANIZED HEALTH CARE EDUCATION/TRAINING PROGRAM

## 2024-12-11 PROCEDURE — 84134 ASSAY OF PREALBUMIN: CPT

## 2024-12-11 PROCEDURE — 2580000003 HC RX 258: Performed by: NURSE PRACTITIONER

## 2024-12-11 PROCEDURE — 6370000000 HC RX 637 (ALT 250 FOR IP): Performed by: FAMILY MEDICINE

## 2024-12-11 PROCEDURE — 83735 ASSAY OF MAGNESIUM: CPT

## 2024-12-11 PROCEDURE — 36415 COLL VENOUS BLD VENIPUNCTURE: CPT

## 2024-12-11 PROCEDURE — 1180000000 HC REHAB R&B

## 2024-12-11 PROCEDURE — 80048 BASIC METABOLIC PNL TOTAL CA: CPT

## 2024-12-11 PROCEDURE — 82306 VITAMIN D 25 HYDROXY: CPT

## 2024-12-11 PROCEDURE — 84100 ASSAY OF PHOSPHORUS: CPT

## 2024-12-11 PROCEDURE — 97530 THERAPEUTIC ACTIVITIES: CPT

## 2024-12-11 PROCEDURE — 97162 PT EVAL MOD COMPLEX 30 MIN: CPT

## 2024-12-11 PROCEDURE — 6370000000 HC RX 637 (ALT 250 FOR IP): Performed by: NURSE PRACTITIONER

## 2024-12-11 PROCEDURE — 97535 SELF CARE MNGMENT TRAINING: CPT

## 2024-12-11 PROCEDURE — 85025 COMPLETE CBC W/AUTO DIFF WBC: CPT

## 2024-12-11 PROCEDURE — 80076 HEPATIC FUNCTION PANEL: CPT

## 2024-12-11 PROCEDURE — 97110 THERAPEUTIC EXERCISES: CPT

## 2024-12-11 PROCEDURE — 97116 GAIT TRAINING THERAPY: CPT

## 2024-12-11 PROCEDURE — 97166 OT EVAL MOD COMPLEX 45 MIN: CPT

## 2024-12-11 RX ADMIN — TRAMADOL HYDROCHLORIDE 50 MG: 50 TABLET, COATED ORAL at 12:14

## 2024-12-11 RX ADMIN — DOCUSATE SODIUM 100 MG: 100 CAPSULE, LIQUID FILLED ORAL at 08:24

## 2024-12-11 RX ADMIN — APIXABAN 5 MG: 5 TABLET, FILM COATED ORAL at 08:24

## 2024-12-11 RX ADMIN — FAMOTIDINE 20 MG: 20 TABLET, FILM COATED ORAL at 21:01

## 2024-12-11 RX ADMIN — CALCIUM 500 MG: 500 TABLET ORAL at 08:24

## 2024-12-11 RX ADMIN — SODIUM CHLORIDE, PRESERVATIVE FREE 10 ML: 5 INJECTION INTRAVENOUS at 08:24

## 2024-12-11 RX ADMIN — CALCIUM 500 MG: 500 TABLET ORAL at 17:49

## 2024-12-11 RX ADMIN — FERROUS SULFATE TAB 325 MG (65 MG ELEMENTAL FE) 325 MG: 325 (65 FE) TAB at 17:49

## 2024-12-11 RX ADMIN — SENNOSIDES 17.2 MG: 8.6 TABLET ORAL at 21:01

## 2024-12-11 RX ADMIN — FINASTERIDE 5 MG: 5 TABLET, FILM COATED ORAL at 21:01

## 2024-12-11 RX ADMIN — TRAMADOL HYDROCHLORIDE 100 MG: 50 TABLET, COATED ORAL at 05:15

## 2024-12-11 RX ADMIN — FERROUS SULFATE TAB 325 MG (65 MG ELEMENTAL FE) 325 MG: 325 (65 FE) TAB at 08:24

## 2024-12-11 RX ADMIN — ATORVASTATIN CALCIUM 40 MG: 40 TABLET, FILM COATED ORAL at 21:01

## 2024-12-11 RX ADMIN — OXYCODONE HYDROCHLORIDE AND ACETAMINOPHEN 500 MG: 500 TABLET ORAL at 08:24

## 2024-12-11 RX ADMIN — ACETAMINOPHEN 650 MG: 325 TABLET ORAL at 10:22

## 2024-12-11 RX ADMIN — ACETAMINOPHEN 650 MG: 325 TABLET ORAL at 05:17

## 2024-12-11 RX ADMIN — ACETAMINOPHEN 650 MG: 325 TABLET ORAL at 14:41

## 2024-12-11 RX ADMIN — TAMSULOSIN HYDROCHLORIDE 0.4 MG: 0.4 CAPSULE ORAL at 21:01

## 2024-12-11 RX ADMIN — DOCUSATE SODIUM 100 MG: 100 CAPSULE, LIQUID FILLED ORAL at 21:01

## 2024-12-11 RX ADMIN — POLYETHYLENE GLYCOL 3350 17 G: 17 POWDER, FOR SOLUTION ORAL at 08:24

## 2024-12-11 RX ADMIN — OXYCODONE HYDROCHLORIDE AND ACETAMINOPHEN 500 MG: 500 TABLET ORAL at 17:49

## 2024-12-11 RX ADMIN — FAMOTIDINE 20 MG: 20 TABLET, FILM COATED ORAL at 08:24

## 2024-12-11 RX ADMIN — APIXABAN 5 MG: 5 TABLET, FILM COATED ORAL at 21:01

## 2024-12-11 RX ADMIN — TRAMADOL HYDROCHLORIDE 50 MG: 50 TABLET, COATED ORAL at 18:13

## 2024-12-11 ASSESSMENT — PAIN DESCRIPTION - ORIENTATION
ORIENTATION: RIGHT;LEFT
ORIENTATION: RIGHT;LEFT;MID
ORIENTATION: MID
ORIENTATION: RIGHT;LEFT

## 2024-12-11 ASSESSMENT — PAIN DESCRIPTION - DESCRIPTORS
DESCRIPTORS: SHARP
DESCRIPTORS: ACHING
DESCRIPTORS: ACHING;DISCOMFORT

## 2024-12-11 ASSESSMENT — PAIN - FUNCTIONAL ASSESSMENT
PAIN_FUNCTIONAL_ASSESSMENT: ACTIVITIES ARE NOT PREVENTED
PAIN_FUNCTIONAL_ASSESSMENT: PREVENTS OR INTERFERES WITH MANY ACTIVE NOT PASSIVE ACTIVITIES
PAIN_FUNCTIONAL_ASSESSMENT: ACTIVITIES ARE NOT PREVENTED

## 2024-12-11 ASSESSMENT — PAIN SCALES - GENERAL
PAINLEVEL_OUTOF10: 4
PAINLEVEL_OUTOF10: 10
PAINLEVEL_OUTOF10: 2
PAINLEVEL_OUTOF10: 1
PAINLEVEL_OUTOF10: 5
PAINLEVEL_OUTOF10: 1
PAINLEVEL_OUTOF10: 0
PAINLEVEL_OUTOF10: 6
PAINLEVEL_OUTOF10: 1
PAINLEVEL_OUTOF10: 0

## 2024-12-11 ASSESSMENT — PAIN DESCRIPTION - LOCATION
LOCATION: LEG;BACK;HIP
LOCATION: BACK;LEG
LOCATION: BACK
LOCATION: PELVIS
LOCATION: BACK;LEG

## 2024-12-11 NOTE — PROGRESS NOTES
This Pre Admission Screen is a refiled document from the acute stay. The Pre Admission was completed and signed on 12/10/2024 at 2:11 by Dr. Priti Arreola.    Priti Arreola DO  Physician  Physical Medicine and Rehabilitation     Progress Notes      Signed     Date of Service: 12/10/2024  2:11 PM   Related encounter: ED to Hosp-Admission (Discharged) from 2024 in [facility] Orthoped[facility] 7K     Signed       Expand All Collapse All    [facility]  Acute Inpatient Rehab Preadmission Assessment     Patient Name: Gonzales Sheehan        Ethnicity:Not of , /a, or Hungarian origin  Race:White  MRN:   [id number]    : 1940  (84 y.o.)  Gender: male      Admitted from:[facility]  Initial Assessment     Date of admission to the hospital: 2024  1:36 AM  Date patient eligible for admission:12/10/2024     Primary Diagnosis: Multiple closed pelvic fractures with disruption of pelvic [place]       Did patient have surgery?  no     Physicians: Schuyler Roman MD, Dr. Arreola, Dr. Rendon, Dr. Ramirez     Risk for clinical complications/co-morbidities:   Past Medical History        Past Medical History:   Diagnosis Date    AMD (age-related macular degeneration), wet (HCC)      Bradycardia      Enlarged prostate      H/O cardiovascular stress test 2018     Equivocal myocardial perfusion study. There is a small/moderate perfusion defect of mild/moderate intensity in the inferolateral and inferior regions during stress imaging which is most consistent with ischemia but may be due to artifact. Overall, thses results are most consistent with a low risk for CAD    H/O echocardiogram 2018     EF 55-60%. The left ventiruclar wall thickness is mildly incrased. Aortic valve calcification without significant stenosis or regurgirtation. Evidence of mild (grade I) diastolic dysfucntion is seen.     History of Holter monitoring 2018     Atrial fibrillation for 51% of the test

## 2024-12-11 NOTE — CARE COORDINATION
C/o of dry cough occasionally due to throat being dried.  NP Anspach notified and gave order for Lozenge.  States it was effective.  Resting in bed this shift. Denies pain

## 2024-12-11 NOTE — PROGRESS NOTES
[facility]  Recreational Therapy  Inpatient Rehabilitation Evaluation        Time Spent with Patient: 10 minutes    Date:  12/11/2024       Patient Name: Gonzales Sheehan      MRN: [id number]       YOB: 1940 (84 y.o.)       Gender: male  Diagnosis: Fall at home, initial encounter  Referring Practitioner: Monserrat Hanson APRN - CNP (ordering); Priti Arreola DO (attending)    RESTRICTIONS/PRECAUTIONS:  Restrictions/Precautions: Weight Bearing, General Precautions, Fall Risk     Hearing: Exceptions to WFL  Hearing Exceptions: Hard of hearing/hearing concerns    PAIN: 7-just received tylenol    SUBJECTIVE:  pt lives with his wife in [place]-they have 6 children-14 grandchildren and 7 great grandchildren     VISION:  Glasses    HEARING: [place]    LEISURE INTERESTS:   Pt states he was golfing up until this year, his family brought him in some word puzzles and deck of cards to use in free time-he enjoys putting puzzles together too-just finished therapy-7/10 pain-just received tylenol-resting at this time-very pleasant    BARRIERS TO LEISURE INTERESTS:    Decreased endurance and Pain           Patient Education  New Education Provided: Importance of Leisure, RT Plan of Care    Plan:  Continue to follow patient through this admission  Include patient in appropriate groups  See patient individually    Electronically signed by: CHANCE CoombsS  Date: 12/11/2024

## 2024-12-11 NOTE — CONSULTS
Department of Family Practice  Consult Note        Reason for Consult:  Medical management while on the Inpatient Rehab unit.    Requesting Physician:  Dr Arreola    CHIEF COMPLAINT:   The need to continue the intensive time with therapies following the acute hospital stay.    History Obtained From:  patient, spouse, EMR    HISTORY OF PRESENT ILLNESS:              The patient is a 84 y.o. male with significant past medical history of       Diagnosis Date    AMD (age-related macular degeneration), wet (HCC)     Bradycardia     Enlarged prostate     H/O cardiovascular stress test 02/13/2018    Equivocal myocardial perfusion study. There is a small/moderate perfusion defect of mild/moderate intensity in the inferolateral and inferior regions during stress imaging which is most consistent with ischemia but may be due to artifact. Overall, thses results are most consistent with a low risk for CAD    H/O echocardiogram 02/13/2018    EF 55-60%. The left ventiruclar wall thickness is mildly incrased. Aortic valve calcification without significant stenosis or regurgirtation. Evidence of mild (grade I) diastolic dysfucntion is seen.     History of Holter monitoring 01/29/2018    Atrial fibrillation for 51% of the test duration with heart rates ranging from 44 to 127 bpm with an average HR of 65 bpm. No symptoms were reported.     Hyperlipidemia     Other and unspecified hyperlipidemia     PAF (paroxysmal atrial fibrillation) (HCC)     Dr. Lennon    Psychosexual dysfunction with inhibited sexual excitement     Unspecified hyperplasia of prostate without urinary obstruction and other lower urinary tract symptoms (LUTS)       who presents with having fallen down several stairs at home. He came to the ED and was found to have sustained multiple fractures. The fractures did not require surgical correction.    Past Medical History:        Diagnosis Date    AMD (age-related macular degeneration), wet (HCC)     Bradycardia     Enlarged

## 2024-12-11 NOTE — H&P
Physical Medicine & Rehabilitation   History and Physical    Chief Complaint and Reason for Rehabilitation Admission: Pelvic/sacral fractures      History of Present Illness:  Gonzales Sheehan  is a 84 y.o. male with PMH significant for bradycardia, BPH, HLD, and paroxysmal a-fib who is being admitted to the inpatient rehabilitation unit on 12/10/2024.  History obtained via: ED documentation, acute care documentation, and patient    Patient initially presented to [facility] on 12/8/2024 as a transfer from [facility] following after a fall down a flight of stairs at home. He reportedly got up to use the restroom in the middle of the night when he lost his balance and fell down a flight of stairs. No reports of any LOC. Patient was unable to ambulate and was taken to the ED for further evaluation. Patient was reportedly found to have multiple pelvic fractures (left sacral ala and avulsion fracture of the left pubic symphysis) with associated hemorrhage (left pelvic sidewall, pubic symphysis diastasis, and left adductor musculature) and spinal fractures (L5 and L4 TP, superior S5 vertebral body). Patient was admitted to Trauma service for further evaluation and management.     On admission both Ortho was consulted. In regards to the sacral and pelvic fractures, ortho recommending closed treatment. Per ortho, patient WBAT BLE and cleared for therapies. Planning for outpatient follow up in 2-3 weeks with Dr. Ramirez and repeat imaging. Cased discussed with Ortho spine and per Trauma notes, okay for therapy without any need for bracing or further workup. Per trauma, plan to resume Eliquis on 12/11 (after 48 hours) and monitoring of H&H.    Patient participated with therapy. PM&R was consulted and ultimately patient determined to be a good candidate for an acute IPR stay in order to maximize his functional progress and independence prior to returning home.    On admission to inpatient rehabilitation unit, patient reports

## 2024-12-11 NOTE — PROGRESS NOTES
[facility]  INPATIENT REHABILITATION  TEAM CONFERENCE NOTE    Conference Date: 2024  Admit Date:  12/10/2024  2:58 PM  Patient Name: Gonzales Sheehan    MRN: [id number]    : 1940  (84 y.o.)  Rehabilitation Admitting Diagnosis:  Fall at home, initial encounter [W19.XXXA, Y92.009]  Referring Practitioner: Monserrat Hanson APRN - CNP (ordering); Priti Arreola DO (attending)      CASE MANAGEMENT  Current issues/needs regarding patient and family discharge status: Prior to fall and hospitalization, patient was active and independent with ADL's and personal care. Patient lives with spouse, Pat. Patient and spouse, Pat, have 6 children; 4 that live close by. Patient indicates children are very supportive. Patient was completing driving, housekeeping, errands, and managing own medications. Patient was on Eliquis prior to admission. Patient's spouse, Pat is able to drive, complete light housekeeping, meals, etc. Patient was not using any medical equipment prior to fall. Patient indicates that his family has already made a room for him to stay on the main level at discharge. Patient is motivated to participate with therapy and is eager to return home. Anticipate patient would benefit from continued therapy upon discharge. SW to follow and maintain involvement in discharge planning.     PHYSICAL THERAPY  Patient is an 84 y.o male who presents s/p fall resulting in L4 and L5 fractures and pelvic fracture resulting in a decline in functional mobility from baseline. Patient demonstrates decreased strength in BLE's and increased complaints of pain resulting in a decline in functional mobility. Patient requires maximal assistance for bed mobility, Moderate to maximal assistance for sit<>stand transfers with cueing for hand placement and anterior weight shifting and Moderate assistance x 1 with wheelchair follow of another for short distance ambulaton with use of RW and decreased L TKE in stance. Patient  predicted outcomes: Pain, Decreased endurance, Lower extremity weakness, and Stairs at home  Follow up with physiatrist? No    Team Members Present at Conference:  :FRANCISCO Montes  Occupational Therapist:Abby Hong OTR/L [id number]  Physical Therapist:Karla Pierre, PT [id number]  Speech Therapist:N/A  Nurse:Saundra Russo RN   Psychologist: Scarlett Baker, PhD    I approve the established interdisciplinary plan of care as documented within the medical record of Gonzales Sheehan. I was present and led the interdisciplinary care conference.    Priti Arreola DO  Electronically signed by Priti Arreola DO on 12/12/2024 at 9:32 AM

## 2024-12-11 NOTE — PLAN OF CARE
Individualized Plan of Care  [facility] Inpatient Rehabilitation Unit    Rehabilitation physician: Dr. Arreola    Admit Date: 12/10/2024     Rehabilitation Diagnosis: Fall at home, initial encounter [W19.XXXA, Y92.009]      Rehabilitation impairments: self care, mobility, motor dysfunction, bowel/bladder management, pain management, and safety    Factors facilitating achievement of predicted outcomes: Family support, Motivated, Cooperative, and Pleasant  Barriers to the achievement of predicted outcomes: Stairs at home, Pain, Decreased endurance, and Lower extremity weakness    Patient Goals: Improve independence with mobility, Improvement of mobility at a wheelchair level, Increase overall strength and endurance, Increase balance, Increase endurance, Increase independence with activities of daily living, Improve cognition, Increase self-awareness, Increase safety awareness, Increase community integration, Increase socialization, Functional communication with caregivers, Integrate appropriate pain management plan, Assure adequate nutritional option for discharge, Continence of bowel and bladder, and Provide appropriate patient and family education      NURSING:  Nursing goals for Gonzales Sheehan while on the rehabilitation unit will include:  Continence of bowel and bladder, Adequate number of bowel movements, Urinate with no urinary retention >300ml in bladder, Maintain O2 SATs at an acceptable level during stay, Effective pain management while on the rehabilitation unit, Establish adequate pain control plan for discharge, Absence of skin breakdown while on the rehabilitation unit, Improved skin integrity via assessments including wound measurements, Avoidance of any hospital acquired infections, No signs/symptoms of infection at the wound site, Freedom from injury during hospitalization, and Complete education with patient/family with understanding demonstrated regarding disease process and  resultant impairment     In order to achieve these goals, nursing interventions may include bowel/bladder training, education for medical assistive devices, medication education, O2 saturation management, energy conservation, stress management techniques, fall prevention, alarms protocol, seating and positioning, skin/wound care, pressure relief instruction, dressing changes, infection protection, DVT prophylaxis, assistance with safe transfers , and/or assistance with bathroom activities and hygiene.       PHYSICAL THERAPY:  Goals:        Short Term Goals  Time Frame for Short Term Goals: 1 week  Short Term Goal 1: Patient to perform supine<>sit with Minimal assistance in order to assist with getting into and out of bed.  Short Term Goal 2: Patient to perform sit<>stand transfers with use of RW and CGA in order to assist with safety with transfers in home.  Short Term Goal 3: Patient to ambulate >/=30 feet with use of RW and CGA in order to assist with home mobility.  Short Term Goal 4: Patient to perform bed<>chair transfers with use of RW and CGA in order to assist with transferring to/from various surfaces.  Short Term Goal 5: Patient to ascend/descend 1 step with B handrails and Minimal assistance in order to assist with progression to home entry.  Long Term Goals  Time Frame for Long Term Goals : 2 weeks from IPR evaluation  Long Term Goal 1: Patient to perform bed mobility supine<>sit with Mod I in order to assist with getting into and out of bed.  Long Term Goal 2: Patient to perform sit<>stand transfers with Mod I in order to assist with home mobility.  Long Term Goal 3: Patient to ambulate >/=100 feet with use of RW and Supervision in order to assist with home mobility.  Long Term Goal 4: Patient to perform car transfer with Supervision in order to assist with getting to and from appointments.  Long Term Goal 5: Patient to ascend/descend 3 steps with B handrails and Supervision in order to assist with home

## 2024-12-11 NOTE — PLAN OF CARE
Problem: Discharge Planning  Goal: Discharge to home or other facility with appropriate resources  12/11/2024 1257 by Tipton, Rylee, LPN  Outcome: Progressing  Flowsheets (Taken 12/11/2024 1027)  Discharge to home or other facility with appropriate resources:   Identify barriers to discharge with patient and caregiver   Identify discharge learning needs (meds, wound care, etc)     Problem: Safety - Adult  Goal: Free from fall injury  12/11/2024 1257 by Tipton, Rylee, LPN  Outcome: Progressing  Flowsheets (Taken 12/11/2024 1257)  Free From Fall Injury: Instruct family/caregiver on patient safety     Problem: ABCDS Injury Assessment  Goal: Absence of physical injury  12/11/2024 1257 by Tipton, Rylee, LPN  Outcome: Progressing  Flowsheets (Taken 12/11/2024 1257)  Absence of Physical Injury: Implement safety measures based on patient assessment     Problem: Pain  Goal: Verbalizes/displays adequate comfort level or baseline comfort level  12/11/2024 1257 by Tipton, Rylee, LPN  Outcome: Progressing  Flowsheets (Taken 12/11/2024 0818)  Verbalizes/displays adequate comfort level or baseline comfort level:   Encourage patient to monitor pain and request assistance   Assess pain using appropriate pain scale   Administer analgesics based on type and severity of pain and evaluate response   Implement non-pharmacological measures as appropriate and evaluate response     Problem: Skin/Tissue Integrity  Goal: Absence of new skin breakdown  Description: 1.  Monitor for areas of redness and/or skin breakdown  2.  Assess vascular access sites hourly  3.  Every 4-6 hours minimum:  Change oxygen saturation probe site  4.  Every 4-6 hours:  If on nasal continuous positive airway pressure, respiratory therapy assess nares and determine need for appliance change or resting period.  12/11/2024 1257 by Tipton, Rylee, LPN  Outcome: Progressing     Problem: Chronic Conditions and Co-morbidities  Goal: Patient's chronic conditions and

## 2024-12-11 NOTE — PROGRESS NOTES
[facility]  Individualized Disclosure Statement      Patient: Gonzales Sheehan      Scope of Service  [facility] provides 24 hour individualized service to patients with functional limitations due to, but not limited to: stroke, brain injury, spinal cord injury, major multiple trauma, fractures, amputation, and neurological disorders. The [facility] provides rehabilitative nursing and medical services as well as physical, occupational, speech, and recreation therapies.  [facility] is fully accredited by the Commission on Accreditation of Rehabilitation Facilities (CARF) as a comprehensive provider of rehabilitation services.  Patients admitted to the [facility] receive a minimum of three hours of therapy per day, at least five days per week, with a revised therapy schedule on weekends and holidays.  Physical therapy, occupational therapy, and speech therapy are provided seven days per week including holidays.  Other therapeutic services are available on weekends and evenings as needed or scheduled.  Intensity of Treatment  Your treatment program will consist of Nursing Care and:  1.5 hours of Physical Therapy, per day  1.5 hours of Occupational Therapy, per day   30-60 minutes of Speech Therapy, per day  1 hour of Recreational Therapy, per week  Depending on your needs, the exact time spent with each of the above disciplines may fluctuate, however you will receive at least 3 hours of therapy at least 5 days per week.    [facility] maintains contracts with most insurance plans.  Depending on the type of coverage, the insurance may impose limits on the coverage for rehabilitation care.  Coverage is based on the premise that you are able to fully participate in the rehabilitation program and show continued progress. Please verify your own insurance information. A

## 2024-12-11 NOTE — PROGRESS NOTES
[facility]  INPATIENT OCCUPATIONAL THERAPY  [facility]  EVALUATION      Discharge Recommendations: Continue to assess pending progress  Equipment Recommendations: Yes        Time In: 1100  Time Out: 1230  Timed Code Treatment Minutes: 75 Minutes  Minutes: 90          Date: 2024  Patient Name: Gonzales Sheehan,   Gender: male      MRN: [id number]  : 1940  (84 y.o.)  Referring Practitioner: oMnserrat Hanson APRN - CNP (ordering); Priti Arreola DO (attending)  Diagnosis: Fall at home, initial encounter  Additional Pertinent Hx: Gonzales Sheehan is a 84 y.o. male admitted to [facility] on 2024. Patient  has a past medical history of AMD (age-related macular degeneration), wet ([facility]), Bradycardia, Enlarged prostate, H/O cardiovascular stress test, H/O echocardiogram, History of Holter monitoring, Hyperlipidemia, Other and unspecified hyperlipidemia, PAF (paroxysmal atrial fibrillation) ([facility]), Psychosexual dysfunction with inhibited sexual excitement, and Unspecified hyperplasia of prostate without urinary obstruction and other lower urinary tract symptoms (LUTS).  Patient presented to [facility] from [facility] after suffering a fall downstairs.  Patient reported no loss of consciousness.  Patient reported getting up to use the restroom around 1:30 in the morning, he lost his balance, and fell down the stairway.  Denied head strike.  Wife was able to assist him and reportedly was moving him around the house his sheets.  Son arrived the following day and patient was unable to ambulate, he was taken to the ER for evaluation and found to have multiple pelvic fractures with associated hemorrhage and spinal fractures.  Patient on Eliquis for A-fib.  Patient's main complaint with left-sided pelvic pain and groin pain.  Patient was admitted.  Repeat CT abdomen pelvis at [facility] reported fractures of the left sacral kris, comminuted and mildly  listed deficits s/p admission with fall resulting in mildly displaced left sacral ala fractures and Nondisplaced left L4 and L5 transverse process fractures. Pt currently requires an increased level of A for all ADLs and IADLs. Pt is completing sit to stand transfers and stand pivot t/fs with Min A and VC for technique. Pt able to complete short distance mobility with Min A and use of RW. Pt is able to complete grooming tasks with SBA while seated and Min A for standing. Pt requires SBA for UB dressing with increased time and min A for abdominal binder. Pt required Max A for LB dressing and Dep for footwear mgmt. Pt is limited by buckling of LE and by pain. Prior to fall, pt was indep with all ADLs and IADLs. Pt was completing yard work and was not using a device. Pt requires cont skilled OT intervention to address the listed deficits and increase overall balance, safety, endurance, and activity tolerance. Without cont skilled OT intervention to address the listed deficits and increase overall balance and endurance to increase ADLs and IADLs.  Performance deficits / Impairments: Decreased functional mobility , Decreased safe awareness, Decreased ADL status, Decreased endurance, Decreased strength, Decreased balance, Decreased high-level IADLs  Prognosis: Good  Decision Making: Medium Complexity    Treatment Initiated: Treatment and education initiated within context of evaluation.  Evaluation time included review of current medical information, gathering information related to past medical, social and functional history, completion of standardized testing, formal and informal observation of tasks, assessment of data and development of plan of care and goals.  Treatment time included skilled education and facilitation of tasks to increase safety and independence with ADL's for improved functional independence and quality of life.    Patient Education:      Patient Education  Education Given To: Patient  Education

## 2024-12-11 NOTE — PROGRESS NOTES
[facility]  INPATIENT PHYSICAL THERAPY  EVALUATION  [facility] - [id number]    Discharge Recommendations: Continue to assess pending progress, Patient would benefit from continued therapy after discharge  Equipment Recommendations:    No (continue to assess pending progress may need manual wheelchair)             Time In: 0700  Time Out: 0800  Timed Code Treatment Minutes: 45 Minutes  Minutes: 60          Date: 2024  Patient Name: Gonzales Sheehan,  Gender:  male        MRN: [id number]  : 1940  (84 y.o.)      Referring Practitioner: Monserrat Hanson APRN - CNP (ordering); Priti Arreola DO (attending)  Diagnosis: Fall at home, initial encounter  Additional Pertinent Hx: Gonzales Sheehan is a 84 y.o. male admitted to [facility] on 2024. Patient  has a past medical history of AMD (age-related macular degeneration), wet ([facility]), Bradycardia, Enlarged prostate, H/O cardiovascular stress test, H/O echocardiogram, History of Holter monitoring, Hyperlipidemia, Other and unspecified hyperlipidemia, PAF (paroxysmal atrial fibrillation) ([facility]), Psychosexual dysfunction with inhibited sexual excitement, and Unspecified hyperplasia of prostate without urinary obstruction and other lower urinary tract symptoms (LUTS).  Patient presented to [facility] from [facility] after suffering a fall downstairs.  Patient reported no loss of consciousness.  Patient reported getting up to use the restroom around 1:30 in the morning, he lost his balance, and fell down the stairway.  Denied head strike.  Wife was able to assist him and reportedly was moving him around the house his sheets.  Son arrived the following day and patient was unable to ambulate, he was taken to the ER for evaluation and found to have multiple pelvic fractures with associated hemorrhage and spinal fractures.  Patient on Eliquis for A-fib.  Patient's main complaint with left-sided pelvic  To: Patient  Education Provided: Role of Therapy, Mobility Training, Plan of Care, Transfer Training, Precautions, Safety  Education Method: Demonstration, Verbal  Barriers to Learning: None  Education Outcome: Verbalized understanding, Demonstrated understanding, Continued education needed   .            Plan:  Current Treatment Recommendations: Strengthening, Balance training, Gait training, Functional mobility training, Transfer training, Patient/Caregiver education & training, Safety education & training, Home exercise program, Therapeutic activities, Endurance training, Stair training, Neuromuscular re-education, ROM  General Plan:  (5x/wk 90 min)    Goals:  Patient Goals : \"To be able to get out of here\"  Short Term Goals  Time Frame for Short Term Goals: 1 week  Short Term Goal 1: Patient to perform supine<>sit with Minimal assistance in order to assist with getting into and out of bed.  Short Term Goal 2: Patient to perform sit<>stand transfers with use of RW and CGA in order to assist with safety with transfers in home.  Short Term Goal 3: Patient to ambulate >/=30 feet with use of RW and CGA in order to assist with home mobility.  Short Term Goal 4: Patient to perform bed<>chair transfers with use of RW and CGA in order to assist with transferring to/from various surfaces.  Short Term Goal 5: Patient to ascend/descend 1 step with B handrails and Minimal assistance in order to assist with progression to home entry.  Long Term Goals  Time Frame for Long Term Goals : 2 weeks from IPR evaluation  Long Term Goal 1: Patient to perform bed mobility supine<>sit with Mod I in order to assist with getting into and out of bed.  Long Term Goal 2: Patient to perform sit<>stand transfers with Mod I in order to assist with home mobility.  Long Term Goal 3: Patient to ambulate >/=100 feet with use of RW and Supervision in order to assist with home mobility.  Long Term Goal 4: Patient to perform car transfer with

## 2024-12-11 NOTE — PROGRESS NOTES
[facility] reported fractures of the left sacral kris, comminuted and mildly displaced, pubic symphyseal diastasis with associated hemorrhage and edema in the parasymphyseal soft tissues and left groin region.  CT abdomen pelvis also showed a nondisplaced left L5 and L4 transverse process fractures along with a fracture of the superior S5 vertebral body, nondisplaced.  Also noted presacral fluid and edema likely representing hemorrhage.  Ortho was consulted, recommended weightbearing as tolerated bilateral lower extremities with a walker, closely monitoring hemoglobin, holding Eliquis day 1 but resuming as long as labs trending positively.  Ortho recommending follow-up with Dr. Ramirez in 2 to 3 weeks for repeat imaging.     Prior Level of Function:  Lives With: Spouse  Type of Home: House  Home Layout: Two level, Bed/Bath upstairs, Able to Live on Main level with bedroom/bathroom (bedroom upstairs, full bath downstairs - no bathroom upstairs. Able to move bedroom downstairs if needed)  Home Access: Stairs to enter with rails  Entrance Stairs - Number of Steps: 3 steps with a post  Home Equipment: Walker - Rolling, Cane   Bathroom Shower/Tub: Walk-in shower  Bathroom Toilet: Standard  Bathroom Equipment: Grab bars in shower  Bathroom Accessibility: Accessible    Prior Level of Assist for ADLs: Independent  Prior Level of Assist for Homemaking: Independent  Homemaking Responsibilities: Yes  Prior Level of Assist for Transfers: Independent  Active : Yes  IADL Comments: Pt was indep with all cooking, cleaning, laundry, ect.  Additional Comments: Patient reports he was independent prior to admission without use of AD. Patient spouse reports she is home most of the time however reports she is active in the community and reports she would be able to physically assist patient. Patient reports he has two sons and a 2 daughters that live nearby. Patient reports his children check in on him and are willing to assist.  Prior

## 2024-12-11 NOTE — PLAN OF CARE
Problem: Discharge Planning  Goal: Discharge to home or other facility with appropriate resources  12/11/2024 0112 by Xiomara Mejia RN  Outcome: Progressing  Flowsheets (Taken 12/10/2024 2128)  Discharge to home or other facility with appropriate resources: Identify barriers to discharge with patient and caregiver     Problem: Safety - Adult  Goal: Free from fall injury  12/11/2024 0112 by Xiomara Mejia RN  Outcome: Progressing     Problem: ABCDS Injury Assessment  Goal: Absence of physical injury  12/11/2024 0112 by Xiomara Mejia RN  Outcome: Progressing     Problem: Pain  Goal: Verbalizes/displays adequate comfort level or baseline comfort level  12/11/2024 0112 by Xiomara Mejia RN  Outcome: Progressing     Problem: Skin/Tissue Integrity  Goal: Absence of new skin breakdown  Description: 1.  Monitor for areas of redness and/or skin breakdown  2.  Assess vascular access sites hourly  3.  Every 4-6 hours minimum:  Change oxygen saturation probe site  4.  Every 4-6 hours:  If on nasal continuous positive airway pressure, respiratory therapy assess nares and determine need for appliance change or resting period.  12/11/2024 0112 by Xiomara Mejia RN  Outcome: Progressing     Problem: Chronic Conditions and Co-morbidities  Goal: Patient's chronic conditions and co-morbidity symptoms are monitored and maintained or improved  12/11/2024 0112 by Xiomara Mejia RN  Outcome: Progressing  Flowsheets (Taken 12/10/2024 2128)  Care Plan - Patient's Chronic Conditions and Co-Morbidity Symptoms are Monitored and Maintained or Improved: Monitor and assess patient's chronic conditions and comorbid symptoms for stability, deterioration, or improvement

## 2024-12-11 NOTE — PROGRESS NOTES
Physical Medicine & Rehabilitation   Progress Note    Chief Complaint:  Pelvic/sacral fractures     Subjective:  Patient seen this morning on rounds with FRANCISCO Montes, following patient's inpatient Rehab Team Conference.  Pain and orthostatic blood pressures seem to be a limiting factor.  Discussed with patient and family at depth.  Family with questions regarding possibility of Flomax contributing.  We discussed that we could hold Flomax and continue to monitor for urinary symptoms given patient's history of BPH.  Additionally, continue FRANCOISE hose and abdominal binder.  Patient reports drinking very little fluids.  Encouraged increased oral intake and taking time with position changes.  Nursing to obtain a full set of orthostatic vitals.    Family also with questions regarding hematomas and if they are still present.  We discussed that the hematomas that were found will take time to reabsorb.  However, his hemoglobin appears to have stabilized and we continue to monitor given the resumption of Eliquis.      Rehabilitation:PT and OT updates reviewed during team rounds. See separate note.       Review of Systems:  CONSTITUTIONAL:  negative for  fevers and chills  EYES:  negative for  visual disturbance  HEENT:  negative for  nasal congestion and sore throat  RESPIRATORY:  negative for  dyspnea and wheezing  CARDIOVASCULAR:  negative for  chest pain, palpitations  GASTROINTESTINAL:  negative for nausea, vomiting, and diarrhea; positive for constipation  GENITOURINARY:  negative for dysuria; per patient, positive for overactive bladder  SKIN:  negative for rash  MUSCULOSKELETAL:  positive for  arthralgias, pain, decreased range of motion, and bone pain  NEUROLOGICAL:  positive for gait problems, negative for numbness or tingling  BEHAVIOR/PSYCH:  negative  10 point system review otherwise negative      Objective:  /77   Pulse 65   Temp 97.7 °F (36.5 °C) (Oral)   Resp 17   Ht 1.88 m (6' 2.02\")   Wt  98.1 kg (216 lb 4.3 oz)   SpO2 96%   BMI 27.76 kg/m²   Patient is awake and alert, sitting up in bedside chair.  Wife and daughter at bedside  Orientation: Not reassessed, however, patient is oriented within conversation  Mood: within normal limits  Affect: calm  General appearance: Patient is well nourished, well developed, well groomed and in no acute distress     Memory: Not formally assessed, however, patient is able to provide a pretty detailed history  Attention/Concentration: He easily follows content of conversation  Language:  normal     Cranial Nerves:  cranial nerves II-XII are grossly intact  ROM:  abnormal -BLE related to pain  Motor Exam: Moving bilateral upper extremities easily against gravity                       BLE: Unable to lift legs off chair in seated position with legs extended. 5/5 ankle DF  Tone:  normal  Muscle bulk: within normal limits  Sensory:  Sensory intact to light touch  Gait: Not assessed     Heart: Well-perfused extremities  Lungs: breathing comfortably on room air without increased work of breathing  Abdomen: soft and non-distended  Skin: warm and dry, no rash or erythema      Diagnostics:   Recent Results (from the past 24 hour(s))   CBC auto differential    Collection Time: 12/12/24  5:11 AM   Result Value Ref Range    WBC 8.7 4.8 - 10.8 thou/mm3    RBC 3.20 (L) 4.70 - 6.10 mill/mm3    Hemoglobin 10.0 (L) 14.0 - 18.0 gm/dl    Hematocrit 30.3 (L) 42.0 - 52.0 %    MCV 94.7 (H) 80.0 - 94.0 fL    MCH 31.3 26.0 - 33.0 pg    MCHC 33.0 32.2 - 35.5 gm/dl    RDW-CV 13.2 11.5 - 14.5 %    RDW-SD 45.4 (H) 35.0 - 45.0 fL    Platelets 236 130 - 400 thou/mm3    MPV 8.8 (L) 9.4 - 12.4 fL    Seg Neutrophils 56.3 %    Lymphocytes 22.5 %    Monocytes % 11.1 %    Eosinophils 9.1 %    Basophils 0.3 %    Immature Granulocytes % 0.7 %    Neutrophils Absolute 4.9 1.8 - 7.7 thou/mm3    Lymphocytes Absolute 2.0 1.0 - 4.8 thou/mm3    Monocytes Absolute 1.0 0.4 - 1.3 thou/mm3    Eosinophils Absolute

## 2024-12-12 LAB
BASOPHILS ABSOLUTE: 0 THOU/MM3 (ref 0–0.1)
BASOPHILS NFR BLD AUTO: 0.3 %
DEPRECATED RDW RBC AUTO: 45.4 FL (ref 35–45)
EOSINOPHIL NFR BLD AUTO: 9.1 %
EOSINOPHILS ABSOLUTE: 0.8 THOU/MM3 (ref 0–0.4)
ERYTHROCYTE [DISTWIDTH] IN BLOOD BY AUTOMATED COUNT: 13.2 % (ref 11.5–14.5)
HCT VFR BLD AUTO: 30.3 % (ref 42–52)
HGB BLD-MCNC: 10 GM/DL (ref 14–18)
IMM GRANULOCYTES # BLD AUTO: 0.06 THOU/MM3 (ref 0–0.07)
IMM GRANULOCYTES NFR BLD AUTO: 0.7 %
LYMPHOCYTES ABSOLUTE: 2 THOU/MM3 (ref 1–4.8)
LYMPHOCYTES NFR BLD AUTO: 22.5 %
MCH RBC QN AUTO: 31.3 PG (ref 26–33)
MCHC RBC AUTO-ENTMCNC: 33 GM/DL (ref 32.2–35.5)
MCV RBC AUTO: 94.7 FL (ref 80–94)
MONOCYTES ABSOLUTE: 1 THOU/MM3 (ref 0.4–1.3)
MONOCYTES NFR BLD AUTO: 11.1 %
NEUTROPHILS ABSOLUTE: 4.9 THOU/MM3 (ref 1.8–7.7)
NEUTROPHILS NFR BLD AUTO: 56.3 %
NRBC BLD AUTO-RTO: 0 /100 WBC
PLATELET # BLD AUTO: 236 THOU/MM3 (ref 130–400)
PMV BLD AUTO: 8.8 FL (ref 9.4–12.4)
RBC # BLD AUTO: 3.2 MILL/MM3 (ref 4.7–6.1)
WBC # BLD AUTO: 8.7 THOU/MM3 (ref 4.8–10.8)

## 2024-12-12 PROCEDURE — 97530 THERAPEUTIC ACTIVITIES: CPT

## 2024-12-12 PROCEDURE — 36415 COLL VENOUS BLD VENIPUNCTURE: CPT

## 2024-12-12 PROCEDURE — 6370000000 HC RX 637 (ALT 250 FOR IP): Performed by: NURSE PRACTITIONER

## 2024-12-12 PROCEDURE — 6370000000 HC RX 637 (ALT 250 FOR IP): Performed by: STUDENT IN AN ORGANIZED HEALTH CARE EDUCATION/TRAINING PROGRAM

## 2024-12-12 PROCEDURE — 85025 COMPLETE CBC W/AUTO DIFF WBC: CPT

## 2024-12-12 PROCEDURE — 97116 GAIT TRAINING THERAPY: CPT

## 2024-12-12 PROCEDURE — 1180000000 HC REHAB R&B

## 2024-12-12 PROCEDURE — 97110 THERAPEUTIC EXERCISES: CPT

## 2024-12-12 PROCEDURE — 99232 SBSQ HOSP IP/OBS MODERATE 35: CPT | Performed by: STUDENT IN AN ORGANIZED HEALTH CARE EDUCATION/TRAINING PROGRAM

## 2024-12-12 PROCEDURE — 6370000000 HC RX 637 (ALT 250 FOR IP): Performed by: FAMILY MEDICINE

## 2024-12-12 PROCEDURE — 97535 SELF CARE MNGMENT TRAINING: CPT

## 2024-12-12 RX ORDER — ACETAMINOPHEN 500 MG
1000 TABLET ORAL EVERY 8 HOURS SCHEDULED
Status: DISCONTINUED | OUTPATIENT
Start: 2024-12-12 | End: 2024-12-24 | Stop reason: HOSPADM

## 2024-12-12 RX ORDER — VITAMIN B COMPLEX
5000 TABLET ORAL DAILY
Status: DISCONTINUED | OUTPATIENT
Start: 2024-12-13 | End: 2024-12-24 | Stop reason: HOSPADM

## 2024-12-12 RX ADMIN — OXYCODONE HYDROCHLORIDE AND ACETAMINOPHEN 500 MG: 500 TABLET ORAL at 08:41

## 2024-12-12 RX ADMIN — SENNOSIDES 17.2 MG: 8.6 TABLET ORAL at 20:53

## 2024-12-12 RX ADMIN — DOCUSATE SODIUM 100 MG: 100 CAPSULE, LIQUID FILLED ORAL at 20:53

## 2024-12-12 RX ADMIN — FAMOTIDINE 20 MG: 20 TABLET, FILM COATED ORAL at 08:41

## 2024-12-12 RX ADMIN — FINASTERIDE 5 MG: 5 TABLET, FILM COATED ORAL at 20:53

## 2024-12-12 RX ADMIN — APIXABAN 5 MG: 5 TABLET, FILM COATED ORAL at 20:53

## 2024-12-12 RX ADMIN — FERROUS SULFATE TAB 325 MG (65 MG ELEMENTAL FE) 325 MG: 325 (65 FE) TAB at 08:41

## 2024-12-12 RX ADMIN — APIXABAN 5 MG: 5 TABLET, FILM COATED ORAL at 08:41

## 2024-12-12 RX ADMIN — ACETAMINOPHEN 1000 MG: 500 TABLET ORAL at 20:53

## 2024-12-12 RX ADMIN — FAMOTIDINE 20 MG: 20 TABLET, FILM COATED ORAL at 20:53

## 2024-12-12 RX ADMIN — POLYETHYLENE GLYCOL 3350 17 G: 17 POWDER, FOR SOLUTION ORAL at 08:41

## 2024-12-12 RX ADMIN — CALCIUM 500 MG: 500 TABLET ORAL at 08:41

## 2024-12-12 RX ADMIN — CALCIUM 500 MG: 500 TABLET ORAL at 16:48

## 2024-12-12 RX ADMIN — ACETAMINOPHEN 1000 MG: 500 TABLET ORAL at 13:46

## 2024-12-12 RX ADMIN — FERROUS SULFATE TAB 325 MG (65 MG ELEMENTAL FE) 325 MG: 325 (65 FE) TAB at 16:48

## 2024-12-12 RX ADMIN — OXYCODONE HYDROCHLORIDE AND ACETAMINOPHEN 500 MG: 500 TABLET ORAL at 16:48

## 2024-12-12 RX ADMIN — ACETAMINOPHEN 650 MG: 325 TABLET ORAL at 01:42

## 2024-12-12 RX ADMIN — ATORVASTATIN CALCIUM 40 MG: 40 TABLET, FILM COATED ORAL at 20:53

## 2024-12-12 RX ADMIN — TRAMADOL HYDROCHLORIDE 100 MG: 50 TABLET, COATED ORAL at 06:17

## 2024-12-12 RX ADMIN — DOCUSATE SODIUM 100 MG: 100 CAPSULE, LIQUID FILLED ORAL at 08:41

## 2024-12-12 RX ADMIN — Medication 4.8 MG: at 01:41

## 2024-12-12 ASSESSMENT — PAIN SCALES - GENERAL
PAINLEVEL_OUTOF10: 3
PAINLEVEL_OUTOF10: 7
PAINLEVEL_OUTOF10: 4
PAINLEVEL_OUTOF10: 1

## 2024-12-12 ASSESSMENT — PAIN DESCRIPTION - LOCATION
LOCATION: LEG
LOCATION: HIP
LOCATION: HIP
LOCATION: BUTTOCKS

## 2024-12-12 ASSESSMENT — PAIN DESCRIPTION - DESCRIPTORS
DESCRIPTORS: SHARP
DESCRIPTORS: DULL
DESCRIPTORS: ACHING

## 2024-12-12 ASSESSMENT — PAIN - FUNCTIONAL ASSESSMENT
PAIN_FUNCTIONAL_ASSESSMENT: PREVENTS OR INTERFERES WITH MANY ACTIVE NOT PASSIVE ACTIVITIES
PAIN_FUNCTIONAL_ASSESSMENT: PREVENTS OR INTERFERES WITH MANY ACTIVE NOT PASSIVE ACTIVITIES

## 2024-12-12 ASSESSMENT — PAIN DESCRIPTION - ORIENTATION
ORIENTATION: POSTERIOR;LEFT;UPPER
ORIENTATION: LEFT

## 2024-12-12 NOTE — PROGRESS NOTES
Physical Medicine & Rehabilitation   Progress Note    Chief Complaint:  Pelvic/sacral fractures     Subjective:  Patient seen this morning, resting in bed.  Patient states he attempted to get up to the bathroom this morning but pain was too significant.  Patient currently icing down hip prior to therapy.  Patient states he did take pain medication this morning.  Patient is on around-the-clock Tylenol, only taking tramadol as needed.  Has only taken 1 dose of tramadol in the last 24 hours.  Continue to encourage icing down the hip before and after therapies.  Through the night patient with acid reflux, Tums was ordered and was helpful.  Magnesium level was normal this morning.  Discussed the holding of Flomax, monitoring his urine output and also his orthostatics.  Patient understanding.  Patient denies any other questions or concerns at this time      Rehabilitation:  PT   Transfers:  Sit to Stand: Moderate Assistance, X 1  Stand to Sit:Minimal Assistance, X 1  *abdominal binder donned  *transfers performed from recliner, and wheelchair during session   1.) Sit to Stand from wheelchair x5 reps with 2 UE support; Min Ax1 with vc's given for postural awareness   Ambulation:  Minimal Assistance, Moderate Assistance, X 1  Distance: 3 feet; 4 feet; 2 feet  Surface: Level Tile  Device: Rolling Walker with abdominal binder   Gait Deviations: Slow Mari, Decreased Step Length Bilaterally, Decreased Weight Shift Bilaterally, Decreased Gait Speed, Decreased Heel Strike Bilaterally, Decreased Foot Clearance Right, Decreased Foot Clearance Left, Narrow Base of Support, Unsteady Gait, Decreased Terminal Knee Extension, Increased reliance on assistive device, and Cues for proximity to assistive device   *increased lightheadedness demonstrated during gait training   Stairs:  Stairs: 4\" steps X 1 step  using Bilateral Handrails and Minimal Assistance, Moderate Assistance, X 1 - 2.  *increased difficulty weight shifting to the  system review otherwise negative      Objective:  /65   Pulse 64   Temp 97.3 °F (36.3 °C)   Resp 16   Ht 1.88 m (6' 2.02\")   Wt 98.2 kg (216 lb 7.9 oz)   SpO2 97%   BMI 27.78 kg/m²   Patient is awake and alert, resting in bed.  No family present  Orientation: Person place and time  Mood: within normal limits  Affect: calm  General appearance: Patient is well nourished, well developed, well groomed and in no acute distress     Memory: Not formally assessed, however, patient is able to provide a pretty detailed history  Attention/Concentration: He easily follows content of conversation  Language:  normal     Cranial Nerves:  cranial nerves II-XII are grossly intact  ROM:  abnormal -BLE related to pain  Motor Exam: Moving bilateral upper extremities easily against gravity. B 5/5 ankle DF  Tone:  normal  Muscle bulk: within normal limits  Sensory:  Sensory intact to light touch  Gait: Not assessed     Heart: Well-perfused extremities  Lungs: breathing comfortably on room air without increased work of breathing  Abdomen: soft and non-distended  Skin: warm and dry, no rash or erythema      Diagnostics:   Recent Results (from the past 24 hour(s))   Magnesium    Collection Time: 12/13/24  6:32 AM   Result Value Ref Range    Magnesium 1.7 1.6 - 2.4 mg/dL       Impression:  Trauma by fall  Multiple pelvic/ pubic fractures with associated hemorrhage; left sacral ala, comminuted and mildly displaced. Pubic symphyseal diastasis  Pelvic hemorrhage and hematoma, complicated by anticoagulant   Lumbar and Sacral spine fractures; Nondisplaced left L5 and L4 transverse process fractures. Fracture of the superior S5 vertebral body, nondisplaced.  Elevated CK   Bradycardia  enlarged prostate  A-fib  HLD    Plan:  Continue inpatient rehabilitation program involving at least 3 hours per day, 5 days per week of intensive rehabilitation.  Rehabilitation services will include PT and OT/RT in order to improve functional status

## 2024-12-12 NOTE — PLAN OF CARE
Problem: Discharge Planning  Goal: Discharge to home or other facility with appropriate resources  12/12/2024 1615 by Ivis Sin LPN  Outcome: Progressing  Flowsheets (Taken 12/12/2024 1615)  Discharge to home or other facility with appropriate resources: Identify barriers to discharge with patient and caregiver  12/12/2024 1147 by Freya Ortega LISW-S  Note: Team conference held Thursday, 12/12. Recommendations of the team were explained to the patient and family by Dr Arreola and SW. Team is recommending that patient continue on acute inpatient rehab for PT and OT, with expected discharge date of Tuesday, 12/24. Following discharge, team is recommending home health vs outpatient therapy pending patient's progress. Patient's wife, Pat, had questions regarding SNF and if patient could qualify for transition. Pat would like to know all of their options. Care plan reviewed with patient and family. Patient and family verbalized understanding of the plan of care and contributed to goal setting.     SW met with patient and family to go over discharge planning options. Post-acute (PAC) provider list was provided to patient. Patient was informed of their freedom to choose PAC provider. Discussed and offered to show the patient the relevant PAC Providers quality and resource use measures on Medicare Compare web site via computer based on patient's goals of care and treatment preferences. Questions regarding selection process were answered. SW answered their questions regarding Medicare benefits and services.    SW to follow and maintain involvement in discharge planning.      Problem: Safety - Adult  Goal: Free from fall injury  Outcome: Progressing  Flowsheets (Taken 12/12/2024 1615)  Free From Fall Injury: Instruct family/caregiver on patient safety     Problem: ABCDS Injury Assessment  Goal: Absence of physical injury  Outcome: Progressing  Flowsheets (Taken 12/12/2024 1615)  Absence of Physical Injury:

## 2024-12-12 NOTE — PLAN OF CARE
patient's chronic conditions and comorbid symptoms for stability, deterioration, or improvement

## 2024-12-12 NOTE — CARE COORDINATION
Resting in bed this shift.  States his pain goal is 4.  States he is happy with his pain control of medication, ice and turning. Goal is to work with therapy.  Has not had a BM.  Declined to take a tameka supp for  earlier shift.  Fluids encouraged.  Scheduled bowel meds given

## 2024-12-12 NOTE — PROGRESS NOTES
[facility]  Occupational Therapy  Daily Note    Discharge Recommendations: Continue to assess pending progress and Patient would benefit from continued OT at discharge  Equipment Recommendations: Yes         Time In: 0700  Time Out: 0800  Timed Code Treatment Minutes: 60 Minutes  Minutes: 60          Date: 2024  Patient Name: Gonzales Sheehan,   Gender: male      Room: [address] [place] [address]  MRN: [id number]  : 1940  (84 y.o.)  Referring Practitioner: Monserrat Hanson APRN - CNP (ordering); Priti Arreola DO (attending)  Diagnosis: Fall at home, initial encounter  Additional Pertinent Hx: Gonzales Sheehan is a 84 y.o. male admitted to [facility] on 2024. Patient  has a past medical history of AMD (age-related macular degeneration), wet ([facility]), Bradycardia, Enlarged prostate, H/O cardiovascular stress test, H/O echocardiogram, History of Holter monitoring, Hyperlipidemia, Other and unspecified hyperlipidemia, PAF (paroxysmal atrial fibrillation) ([facility]), Psychosexual dysfunction with inhibited sexual excitement, and Unspecified hyperplasia of prostate without urinary obstruction and other lower urinary tract symptoms (LUTS).  Patient presented to [facility] from [facility] after suffering a fall downstairs.  Patient reported no loss of consciousness.  Patient reported getting up to use the restroom around 1:30 in the morning, he lost his balance, and fell down the stairway.  Denied head strike.  Wife was able to assist him and reportedly was moving him around the house his sheets.  Son arrived the following day and patient was unable to ambulate, he was taken to the ER for evaluation and found to have multiple pelvic fractures with associated hemorrhage and spinal fractures.  Patient on Eliquis for A-fib.  Patient's main complaint with left-sided pelvic pain and groin pain.  Patient was admitted.  Repeat CT abdomen pelvis at [facility] reported  training    Education:  Learners: Patient fall prevention, transfer technique, exercise technique   Goals  Short Term Goals  Time Frame for Short Term Goals: 1 week  Short Term Goal 1: Pt will safely navigate environment to complete item retrieval & transport of >/= 5 items with CGA and no VC for safety to increase indep with ADLs  Short Term Goal 2: Pt will tolerate dynamic standing x5min with CGA and 1-2 UE release to increase indep with grooming  Short Term Goal 3: Pt will complete LB ADLs with LHAE prn and no > than Min A for increased indep with dressing  Short Term Goal 4: Pt will safely complete simple homemaking task with Min A and min VC for technique and adaptations to increase indep with heating a meal up  Short Term Goal 5: Pt will verbalize and incorporate >/= 3 fall prevention techniques during IADLs to increase safety during daily routine  Long Term Goals  Time Frame for Long Term Goals : 2 weeks from eval  Long Term Goal 1: Pt will complete BADL routine with Mod I and adaptations prn to maintain spinal precautions to increase indep with self care  Long Term Goal 2: Pt will complete multi-step homemaking task with Supervision and adaptations prn while maintaining spinal precautions to increase indep with meal prep  Long Term Goal 3: Pt will engage in and complete community re-entry task with Supervision and no safety cues to increase indep with getting groceries  Long Term Goal 4: Pt will demo improved occupational performance as evidenced by modifed barthel index of 91/100    Following session, patient left in safe position with all fall risk precautions in place.

## 2024-12-12 NOTE — PROGRESS NOTES
Patient received sacramental anointing by a . If you are in need of  support, please call [phone]. If you are in need of a  after 6:30pm, please call the house supervisor for the on-call .      Roya Ibrahim  [facility] Health Coordinator  [phone]

## 2024-12-12 NOTE — PROGRESS NOTES
Comprehensive Nutrition Assessment    Type and Reason for Visit:  Initial, Consult (Oral Nutrition Supplements)    Nutrition Recommendations/Plan:   Recommend diet as tolerated.  Trial Ensure Plus BID.  Encouraged po, good nutrition at best efforts.  Encouraged food from home, outside as desired.  Recommend MVI.     Malnutrition Assessment:  Malnutrition Status:  At risk for malnutrition (12/12/24 1345)    Context:  Acute Illness     Findings of the 6 clinical characteristics of malnutrition:  Energy Intake:  Mild decrease in energy intake  Weight Loss:  No weight loss     Body Fat Loss:  No body fat loss     Muscle Mass Loss:  No muscle mass loss    Fluid Accumulation:  Unable to assess     Strength:  Not Performed    Nutrition Assessment:     Pt. nutritionally compromised AEB wounds.  At risk for further nutrition compromise r/t increased nutrient needs for wound healing, admit s/p fall, fractures; advanced age and underlying medical condition (hx HLD).       Nutrition Related Findings:    Pt. Report/Treatments/Miscellaneous:   12/12-pt. Seen - reports good appetite but states not eating well d/t dislikes hospital food; denies any trouble tolerating diet; reports good appetite and intake pta (consumes 2-3 meals/day); agrees to trial Ensure Plus BID; wife seems receptive to bringing in food from outside  GI Status: BM 12/11  Pertinent Labs: 12/11: Glucose 105, BUN 16, Cr 0.9  Pertinent Meds: Vitamin C, Iron, Pepcid, Glycolax, Senokot, Colace, Zofran      Wound Type:  (knee-type not noted; traumatic-scrotum, PI-buttocks,  wound-ulnar - type not noted)       Current Nutrition Intake & Therapies:    Average Meal Intake: 1-25%, 26-50%, 51-75%, %  Average Supplements Intake:  (initiated)  ADULT DIET; Regular  ADULT ORAL NUTRITION SUPPLEMENT; Breakfast, Dinner; Standard High Calorie/High Protein Oral Supplement    Anthropometric Measures:  Height: 188 cm (6' 2.02\")  Ideal Body Weight (IBW): 190 lbs (86 kg)

## 2024-12-12 NOTE — PROGRESS NOTES
home entry.  Additional Goals?: Yes  Long term goal 6: PT to assess Tinetti/TUG when able    Following session, patient left in safe position with all fall risk precautions in place.

## 2024-12-12 NOTE — PROGRESS NOTES
[facility]  Occupational Therapy  Daily Note    Discharge Recommendations: Continue to assess pending progress and Patient would benefit from continued OT at discharge  Equipment Recommendations: Yes         Time In: 1430  Time Out: 1502  Timed Code Treatment Minutes: 32 Minutes  Minutes: 32          Date: 2024  Patient Name: Gonzales Sheehan,   Gender: male      Room: [address] [place] [address]  MRN: [id number]  : 1940  (84 y.o.)  Referring Practitioner: Monserrat Hanson APRN - CNP (ordering); Priti Arreola DO (attending)  Diagnosis: Fall at home, initial encounter  Additional Pertinent Hx: Gonzales Sheehan is a 84 y.o. male admitted to [facility] on 2024. Patient  has a past medical history of AMD (age-related macular degeneration), wet (HCC), Bradycardia, Enlarged prostate, H/O cardiovascular stress test, H/O echocardiogram, History of Holter monitoring, Hyperlipidemia, Other and unspecified hyperlipidemia, PAF (paroxysmal atrial fibrillation) ([facility]), Psychosexual dysfunction with inhibited sexual excitement, and Unspecified hyperplasia of prostate without urinary obstruction and other lower urinary tract symptoms (LUTS).  Patient presented to [facility] from [facility] after suffering a fall downstairs.  Patient reported no loss of consciousness.  Patient reported getting up to use the restroom around 1:30 in the morning, he lost his balance, and fell down the stairway.  Denied head strike.  Wife was able to assist him and reportedly was moving him around the house his sheets.  Son arrived the following day and patient was unable to ambulate, he was taken to the ER for evaluation and found to have multiple pelvic fractures with associated hemorrhage and spinal fractures.  Patient on Eliquis for A-fib.  Patient's main complaint with left-sided pelvic pain and groin pain.  Patient was admitted.  Repeat CT abdomen pelvis at [facility] reported

## 2024-12-12 NOTE — PROGRESS NOTES
Truck  Occupation: Retired  Leisure & Hobbies: Golfing, enjoys yard work & washing the cars, takes care of garden  IADL Comments: Pt was indep with all cooking, cleaning, laundry, ect.  Additional Comments: Patient reports he was independent prior to admission without use of AD. Patient spouse reports she is home most of the time however reports she is active in the community and reports she would be able to physically assist patient. Patient reports he has two sons and a 2 daughters that live nearby. Patient reports his children check in on him and are willing to assist.    Contact/Guardian Information: Spouse, Rosey Sheehan, [phone].    Community Resources Utilized: No community resources utilized prior to admission.    Sexuality/Intimacy: No issues or concerns identified at time of SW assessment.    Complementary Health Approaches: Patient with no current interest in complementary health approaches at time of SW assessment.     Anticipated Needs/Discharge Plans: Anticipate patient would benefit from continued therapy upon discharge.     SW met with patient to introduce self and explain role, complete SW assessment, and initiate discharge planning. Prior to fall and hospitalization, patient was active and independent with ADL's and personal care. Patient lives with spouse, Kathie. Patient and spouse, Pat, have 6 children; 4 that live close by. Patient indicates children are very supportive. Patient was completing driving, housekeeping, errands, and managing own medications. Patient was on Eliquis prior to admission. Patient's spouse, Kathie is able to drive, complete light housekeeping, meals, etc. Patient was not using any medical equipment prior to fall. Patient is motivated to participate with therapy and is eager to return home. Anticipate patient would benefit from continued therapy upon discharge. SW to follow and maintain involvement in discharge planning.             Read-Only, Retired: Discharge  Planning  Living Arrangements: Spouse/Significant Other  Support Systems: Children, Spouse/Significant Other, Friends/Neighbors  Potential Assistance Needed: Durable Medical Equipment  M2B Y/N: Yes  Type of Home Care Services: None  Patient expects to be discharged to:: House  Expected Discharge Date:  (Undetermined)      Electronically signed by SCOT Albright on 12/12/2024 at 8:22 AM

## 2024-12-12 NOTE — PROGRESS NOTES
Patient received sacramental anointing by a . If you are in need of  support, please call [phone]. If you are in need of a  after 6:30pm, please call the house supervisor for the on-call .     12/12/24 1340   Encounter Summary   Encounter Overview/Reason Spiritual/Emotional Needs   Service Provided For Patient   [facility]   Support System Family members   Last Encounter  12/12/24  (Anointed)   Complexity of Encounter Low   Begin Time 1030   End Time  1037   Total Time Calculated 7 min   Spiritual/Emotional needs   Type Spiritual Support   Rituals, Rites and Sacraments   Type Anointing   Assessment/Intervention/Outcome   Assessment Calm   Intervention Empowerment   Outcome Encouraged;Engaged in conversation

## 2024-12-12 NOTE — PLAN OF CARE
Problem: Discharge Planning  Goal: Discharge to home or other facility with appropriate resources  12/12/2024 1147 by Freya Ortega LISW-S  Note: Team conference held Thursday, 12/12. Recommendations of the team were explained to the patient and family by Dr Arreola and SW. Team is recommending that patient continue on acute inpatient rehab for PT and OT, with expected discharge date of Tuesday, 12/24. Following discharge, team is recommending home health vs outpatient therapy pending patient's progress. Patient's wife, Pat, had questions regarding SNF and if patient could qualify for transition. Pat would like to know all of their options. Care plan reviewed with patient and family. Patient and family verbalized understanding of the plan of care and contributed to goal setting.     SW met with patient and family to go over discharge planning options. Post-acute (PAC) provider list was provided to patient. Patient was informed of their freedom to choose PAC provider. Discussed and offered to show the patient the relevant PAC Providers quality and resource use measures on Medicare Compare web site via computer based on patient's goals of care and treatment preferences. Questions regarding selection process were answered. SW answered their questions regarding Medicare benefits and services.    SW to follow and maintain involvement in discharge planning.

## 2024-12-13 LAB — MAGNESIUM SERPL-MCNC: 1.7 MG/DL (ref 1.6–2.4)

## 2024-12-13 PROCEDURE — 97110 THERAPEUTIC EXERCISES: CPT

## 2024-12-13 PROCEDURE — 97530 THERAPEUTIC ACTIVITIES: CPT

## 2024-12-13 PROCEDURE — 99231 SBSQ HOSP IP/OBS SF/LOW 25: CPT | Performed by: NURSE PRACTITIONER

## 2024-12-13 PROCEDURE — 6370000000 HC RX 637 (ALT 250 FOR IP): Performed by: FAMILY MEDICINE

## 2024-12-13 PROCEDURE — 83735 ASSAY OF MAGNESIUM: CPT

## 2024-12-13 PROCEDURE — 97535 SELF CARE MNGMENT TRAINING: CPT

## 2024-12-13 PROCEDURE — 1180000000 HC REHAB R&B

## 2024-12-13 PROCEDURE — 6370000000 HC RX 637 (ALT 250 FOR IP): Performed by: STUDENT IN AN ORGANIZED HEALTH CARE EDUCATION/TRAINING PROGRAM

## 2024-12-13 PROCEDURE — 6370000000 HC RX 637 (ALT 250 FOR IP): Performed by: NURSE PRACTITIONER

## 2024-12-13 PROCEDURE — 36415 COLL VENOUS BLD VENIPUNCTURE: CPT

## 2024-12-13 RX ORDER — CALCIUM CARBONATE 500 MG/1
1000 TABLET, CHEWABLE ORAL 3 TIMES DAILY PRN
Status: DISCONTINUED | OUTPATIENT
Start: 2024-12-13 | End: 2024-12-24 | Stop reason: HOSPADM

## 2024-12-13 RX ORDER — LANOLIN ALCOHOL/MO/W.PET/CERES
400 CREAM (GRAM) TOPICAL 2 TIMES DAILY
Status: DISCONTINUED | OUTPATIENT
Start: 2024-12-13 | End: 2024-12-24 | Stop reason: HOSPADM

## 2024-12-13 RX ADMIN — Medication 400 MG: at 20:59

## 2024-12-13 RX ADMIN — FERROUS SULFATE TAB 325 MG (65 MG ELEMENTAL FE) 325 MG: 325 (65 FE) TAB at 07:53

## 2024-12-13 RX ADMIN — CALCIUM 500 MG: 500 TABLET ORAL at 07:54

## 2024-12-13 RX ADMIN — APIXABAN 5 MG: 5 TABLET, FILM COATED ORAL at 20:59

## 2024-12-13 RX ADMIN — ACETAMINOPHEN 1000 MG: 500 TABLET ORAL at 13:12

## 2024-12-13 RX ADMIN — TRAMADOL HYDROCHLORIDE 100 MG: 50 TABLET, COATED ORAL at 14:53

## 2024-12-13 RX ADMIN — DOCUSATE SODIUM 100 MG: 100 CAPSULE, LIQUID FILLED ORAL at 20:59

## 2024-12-13 RX ADMIN — DOCUSATE SODIUM 100 MG: 100 CAPSULE, LIQUID FILLED ORAL at 07:52

## 2024-12-13 RX ADMIN — OXYCODONE HYDROCHLORIDE AND ACETAMINOPHEN 500 MG: 500 TABLET ORAL at 16:21

## 2024-12-13 RX ADMIN — APIXABAN 5 MG: 5 TABLET, FILM COATED ORAL at 07:53

## 2024-12-13 RX ADMIN — ANTACID TABLETS 1000 MG: 500 TABLET, CHEWABLE ORAL at 00:32

## 2024-12-13 RX ADMIN — FAMOTIDINE 20 MG: 20 TABLET, FILM COATED ORAL at 20:59

## 2024-12-13 RX ADMIN — OXYCODONE HYDROCHLORIDE AND ACETAMINOPHEN 500 MG: 500 TABLET ORAL at 07:52

## 2024-12-13 RX ADMIN — CYCLOBENZAPRINE 10 MG: 10 TABLET, FILM COATED ORAL at 10:08

## 2024-12-13 RX ADMIN — FAMOTIDINE 20 MG: 20 TABLET, FILM COATED ORAL at 07:52

## 2024-12-13 RX ADMIN — POLYETHYLENE GLYCOL 3350 17 G: 17 POWDER, FOR SOLUTION ORAL at 07:52

## 2024-12-13 RX ADMIN — FERROUS SULFATE TAB 325 MG (65 MG ELEMENTAL FE) 325 MG: 325 (65 FE) TAB at 16:21

## 2024-12-13 RX ADMIN — FINASTERIDE 5 MG: 5 TABLET, FILM COATED ORAL at 20:59

## 2024-12-13 RX ADMIN — ACETAMINOPHEN 1000 MG: 500 TABLET ORAL at 20:59

## 2024-12-13 RX ADMIN — TRAMADOL HYDROCHLORIDE 50 MG: 50 TABLET, COATED ORAL at 07:54

## 2024-12-13 RX ADMIN — SENNOSIDES 17.2 MG: 8.6 TABLET ORAL at 20:59

## 2024-12-13 RX ADMIN — ACETAMINOPHEN 1000 MG: 500 TABLET ORAL at 05:50

## 2024-12-13 RX ADMIN — CALCIUM 500 MG: 500 TABLET ORAL at 16:21

## 2024-12-13 RX ADMIN — ATORVASTATIN CALCIUM 40 MG: 40 TABLET, FILM COATED ORAL at 20:59

## 2024-12-13 RX ADMIN — Medication 5000 UNITS: at 07:54

## 2024-12-13 ASSESSMENT — PAIN SCALES - GENERAL
PAINLEVEL_OUTOF10: 10
PAINLEVEL_OUTOF10: 3
PAINLEVEL_OUTOF10: 3
PAINLEVEL_OUTOF10: 4
PAINLEVEL_OUTOF10: 5
PAINLEVEL_OUTOF10: 5
PAINLEVEL_OUTOF10: 9

## 2024-12-13 ASSESSMENT — PAIN DESCRIPTION - ORIENTATION
ORIENTATION: RIGHT;LEFT
ORIENTATION: RIGHT;LEFT;LOWER
ORIENTATION: LEFT
ORIENTATION: RIGHT;LEFT

## 2024-12-13 ASSESSMENT — PAIN DESCRIPTION - LOCATION
LOCATION: BACK;HIP
LOCATION: GROIN;PELVIS
LOCATION: BACK
LOCATION: PELVIS

## 2024-12-13 ASSESSMENT — PAIN DESCRIPTION - DESCRIPTORS
DESCRIPTORS: ACHING
DESCRIPTORS: ACHING;SPASM;SHOOTING
DESCRIPTORS: ACHING
DESCRIPTORS: SPASM

## 2024-12-13 NOTE — CARE COORDINATION
Patient rested well last night. He had an episode of acid reflux in the middle of the night. Tums TID PRN was ordered, pt was relieved. He is continent of bowel and bladder. No discomfort or pain noted. V/S within normal limits. Electronically signed by Schuyler Giles RN on 12/13/2024 at 4:22 AM

## 2024-12-13 NOTE — CARE COORDINATION
Patient educated on how to use incentive spirometer. Patient verbalized understanding and demonstrated proper use. Emphasized importance and usage of device, with coughing and deep breathing every 4 hours while awake.      Patient has worked well with therapy today. Patient has had loose bowels today.

## 2024-12-13 NOTE — PROGRESS NOTES
[facility]  Occupational Therapy  Daily Note    Discharge Recommendations: Continue to assess pending progress and Patient would benefit from continued OT at discharge  Equipment Recommendations: Yes      Time In: 1327  Time Out: 1457  Timed Code Treatment Minutes: 90 Minutes  Minutes: 90          Date: 2024  Patient Name: Gonzales Sheehan,   Gender: male      Room: [address] [place] [address]  MRN: [id number]  : 1940  (84 y.o.)  Referring Practitioner: Monserrat Hanson APRN - CNP (ordering); Priti Arreola DO (attending)  Diagnosis: Fall at home, initial encounter  Additional Pertinent Hx: Gonzales Sheehan is a 84 y.o. male admitted to [facility] on 2024. Patient  has a past medical history of AMD (age-related macular degeneration), wet (HCC), Bradycardia, Enlarged prostate, H/O cardiovascular stress test, H/O echocardiogram, History of Holter monitoring, Hyperlipidemia, Other and unspecified hyperlipidemia, PAF (paroxysmal atrial fibrillation) ([facility]), Psychosexual dysfunction with inhibited sexual excitement, and Unspecified hyperplasia of prostate without urinary obstruction and other lower urinary tract symptoms (LUTS).  Patient presented to [facility] from [facility] after suffering a fall downstairs.  Patient reported no loss of consciousness.  Patient reported getting up to use the restroom around 1:30 in the morning, he lost his balance, and fell down the stairway.  Denied head strike.  Wife was able to assist him and reportedly was moving him around the house his sheets.  Son arrived the following day and patient was unable to ambulate, he was taken to the ER for evaluation and found to have multiple pelvic fractures with associated hemorrhage and spinal fractures.  Patient on Eliquis for A-fib.  Patient's main complaint with left-sided pelvic pain and groin pain.  Patient was admitted.  Repeat CT abdomen pelvis at [facility] reported  Safety    Goals  Short Term Goals  Time Frame for Short Term Goals: 1 week  Short Term Goal 1: Pt will safely navigate environment to complete item retrieval & transport of >/= 5 items with CGA and no VC for safety to increase indep with ADLs  Short Term Goal 2: Pt will tolerate dynamic standing x5min with CGA and 1-2 UE release to increase indep with grooming  Short Term Goal 3: Pt will complete LB ADLs with LHAE prn and no > than Min A for increased indep with dressing  Short Term Goal 4: Pt will safely complete simple homemaking task with Min A and min VC for technique and adaptations to increase indep with heating a meal up  Short Term Goal 5: Pt will verbalize and incorporate >/= 3 fall prevention techniques during IADLs to increase safety during daily routine  Long Term Goals  Time Frame for Long Term Goals : 2 weeks from eval  Long Term Goal 1: Pt will complete BADL routine with Mod I and adaptations prn to maintain spinal precautions to increase indep with self care  Long Term Goal 2: Pt will complete multi-step homemaking task with Supervision and adaptations prn while maintaining spinal precautions to increase indep with meal prep  Long Term Goal 3: Pt will engage in and complete community re-entry task with Supervision and no safety cues to increase indep with getting groceries  Long Term Goal 4: Pt will demo improved occupational performance as evidenced by modifed barthel index of 91/100    Following session, patient left in safe position with all fall risk precautions in place.

## 2024-12-13 NOTE — PLAN OF CARE
Problem: Discharge Planning  Goal: Discharge to home or other facility with appropriate resources  12/12/2024 2349 by Schuyler Giles, RN  Outcome: Progressing  Flowsheets (Taken 12/12/2024 2045)  Discharge to home or other facility with appropriate resources: Identify barriers to discharge with patient and caregiver     Problem: Safety - Adult  Goal: Free from fall injury  12/12/2024 2349 by Schuyler Giles, RN  Outcome: Progressing     Problem: ABCDS Injury Assessment  Goal: Absence of physical injury  12/12/2024 2349 by Schuyler Giles, RN  Outcome: Progressing     Problem: Pain  Goal: Verbalizes/displays adequate comfort level or baseline comfort level  12/12/2024 2349 by Schuyler Giles, RN  Outcome: Progressing  Flowsheets (Taken 12/12/2024 2045)  Verbalizes/displays adequate comfort level or baseline comfort level: Encourage patient to monitor pain and request assistance     Problem: Skin/Tissue Integrity  Goal: Absence of new skin breakdown  Description: 1.  Monitor for areas of redness and/or skin breakdown  2.  Assess vascular access sites hourly  3.  Every 4-6 hours minimum:  Change oxygen saturation probe site  4.  Every 4-6 hours:  If on nasal continuous positive airway pressure, respiratory therapy assess nares and determine need for appliance change or resting period.  12/12/2024 2349 by Schuyler Giles, RN  Outcome: Progressing

## 2024-12-13 NOTE — PLAN OF CARE
Problem: Discharge Planning  Goal: Discharge to home or other facility with appropriate resources  12/13/2024 1042 by Melanie Pérez RN  Outcome: Progressing  Flowsheets (Taken 12/12/2024 2045 by Schuyler Giles, RN)  Discharge to home or other facility with appropriate resources: Identify barriers to discharge with patient and caregiver  12/12/2024 2349 by Schuyler Giles RN  Outcome: Progressing  Flowsheets (Taken 12/12/2024 2045)  Discharge to home or other facility with appropriate resources: Identify barriers to discharge with patient and caregiver     Problem: Safety - Adult  Goal: Free from fall injury  12/13/2024 1042 by Melanie Pérez RN  Outcome: Progressing  Flowsheets (Taken 12/12/2024 1615 by Ivis Sin LPN)  Free From Fall Injury: Instruct family/caregiver on patient safety  12/12/2024 2349 by Schuyler Giles RN  Outcome: Progressing     Problem: ABCDS Injury Assessment  Goal: Absence of physical injury  12/13/2024 1042 by Melanie Pérez RN  Flowsheets (Taken 12/12/2024 1615 by Ivis Sin LPN)  Absence of Physical Injury: Implement safety measures based on patient assessment  12/12/2024 2349 by Schuyler Giles RN  Outcome: Progressing     Problem: Pain  Goal: Verbalizes/displays adequate comfort level or baseline comfort level  12/13/2024 1042 by Melanie Pérez RN  Outcome: Progressing  Flowsheets (Taken 12/12/2024 2045 by Schuyler Giles, RN)  Verbalizes/displays adequate comfort level or baseline comfort level: Encourage patient to monitor pain and request assistance  12/12/2024 2349 by Schuyler Giles RN  Outcome: Progressing  Flowsheets (Taken 12/12/2024 2045)  Verbalizes/displays adequate comfort level or baseline comfort level: Encourage patient to monitor pain and request assistance     Problem: Skin/Tissue Integrity  Goal: Absence of new skin breakdown  Description: 1.  Monitor for areas of redness and/or skin breakdown  2.  Assess  vascular access sites hourly  3.  Every 4-6 hours minimum:  Change oxygen saturation probe site  4.  Every 4-6 hours:  If on nasal continuous positive airway pressure, respiratory therapy assess nares and determine need for appliance change or resting period.  12/13/2024 1042 by Melanie Pérez RN  Outcome: Progressing  12/12/2024 2349 by Schuyler Giles, RN  Outcome: Progressing     Problem: Chronic Conditions and Co-morbidities  Goal: Patient's chronic conditions and co-morbidity symptoms are monitored and maintained or improved  12/13/2024 1042 by Melanie Pérez RN  Outcome: Progressing  Flowsheets (Taken 12/12/2024 2045 by Schuyler Giles, RN)  Care Plan - Patient's Chronic Conditions and Co-Morbidity Symptoms are Monitored and Maintained or Improved: Monitor and assess patient's chronic conditions and comorbid symptoms for stability, deterioration, or improvement  12/12/2024 2349 by Schuyler Giles, RN  Outcome: Progressing  Flowsheets (Taken 12/12/2024 2045)  Care Plan - Patient's Chronic Conditions and Co-Morbidity Symptoms are Monitored and Maintained or Improved: Monitor and assess patient's chronic conditions and comorbid symptoms for stability, deterioration, or improvement     Problem: Nutrition Deficit:  Goal: Optimize nutritional status  12/13/2024 1042 by Melanie Pérez RN  Outcome: Progressing  Flowsheets (Taken 12/12/2024 1615 by Ivis Sin LPN)  Nutrient intake appropriate for improving, restoring, or maintaining nutritional needs: Assess nutritional status and recommend course of action  12/12/2024 2349 by Schuyler Giles, RN  Outcome: Progressing

## 2024-12-14 PROCEDURE — 97535 SELF CARE MNGMENT TRAINING: CPT

## 2024-12-14 PROCEDURE — 97110 THERAPEUTIC EXERCISES: CPT

## 2024-12-14 PROCEDURE — 6370000000 HC RX 637 (ALT 250 FOR IP): Performed by: STUDENT IN AN ORGANIZED HEALTH CARE EDUCATION/TRAINING PROGRAM

## 2024-12-14 PROCEDURE — 97116 GAIT TRAINING THERAPY: CPT

## 2024-12-14 PROCEDURE — 6370000000 HC RX 637 (ALT 250 FOR IP): Performed by: FAMILY MEDICINE

## 2024-12-14 PROCEDURE — 97530 THERAPEUTIC ACTIVITIES: CPT

## 2024-12-14 PROCEDURE — 6370000000 HC RX 637 (ALT 250 FOR IP): Performed by: NURSE PRACTITIONER

## 2024-12-14 PROCEDURE — 1180000000 HC REHAB R&B

## 2024-12-14 RX ADMIN — APIXABAN 5 MG: 5 TABLET, FILM COATED ORAL at 09:31

## 2024-12-14 RX ADMIN — CALCIUM 500 MG: 500 TABLET ORAL at 09:30

## 2024-12-14 RX ADMIN — DOCUSATE SODIUM 100 MG: 100 CAPSULE, LIQUID FILLED ORAL at 19:41

## 2024-12-14 RX ADMIN — APIXABAN 5 MG: 5 TABLET, FILM COATED ORAL at 19:41

## 2024-12-14 RX ADMIN — CALCIUM 500 MG: 500 TABLET ORAL at 17:13

## 2024-12-14 RX ADMIN — Medication 5000 UNITS: at 09:30

## 2024-12-14 RX ADMIN — TRAMADOL HYDROCHLORIDE 100 MG: 50 TABLET, COATED ORAL at 09:24

## 2024-12-14 RX ADMIN — ACETAMINOPHEN 1000 MG: 500 TABLET ORAL at 14:48

## 2024-12-14 RX ADMIN — Medication 400 MG: at 09:30

## 2024-12-14 RX ADMIN — SENNOSIDES 17.2 MG: 8.6 TABLET ORAL at 19:41

## 2024-12-14 RX ADMIN — FAMOTIDINE 20 MG: 20 TABLET, FILM COATED ORAL at 09:30

## 2024-12-14 RX ADMIN — FINASTERIDE 5 MG: 5 TABLET, FILM COATED ORAL at 19:41

## 2024-12-14 RX ADMIN — ACETAMINOPHEN 1000 MG: 500 TABLET ORAL at 05:46

## 2024-12-14 RX ADMIN — FAMOTIDINE 20 MG: 20 TABLET, FILM COATED ORAL at 19:41

## 2024-12-14 RX ADMIN — Medication 400 MG: at 19:41

## 2024-12-14 RX ADMIN — ATORVASTATIN CALCIUM 40 MG: 40 TABLET, FILM COATED ORAL at 19:41

## 2024-12-14 RX ADMIN — FERROUS SULFATE TAB 325 MG (65 MG ELEMENTAL FE) 325 MG: 325 (65 FE) TAB at 09:31

## 2024-12-14 RX ADMIN — ACETAMINOPHEN 1000 MG: 500 TABLET ORAL at 20:04

## 2024-12-14 RX ADMIN — FERROUS SULFATE TAB 325 MG (65 MG ELEMENTAL FE) 325 MG: 325 (65 FE) TAB at 17:13

## 2024-12-14 RX ADMIN — OXYCODONE HYDROCHLORIDE AND ACETAMINOPHEN 500 MG: 500 TABLET ORAL at 17:13

## 2024-12-14 RX ADMIN — OXYCODONE HYDROCHLORIDE AND ACETAMINOPHEN 500 MG: 500 TABLET ORAL at 09:30

## 2024-12-14 RX ADMIN — DOCUSATE SODIUM 100 MG: 100 CAPSULE, LIQUID FILLED ORAL at 09:30

## 2024-12-14 ASSESSMENT — PAIN DESCRIPTION - DESCRIPTORS
DESCRIPTORS: ACHING;THROBBING
DESCRIPTORS: ACHING

## 2024-12-14 ASSESSMENT — PAIN SCALES - GENERAL
PAINLEVEL_OUTOF10: 10
PAINLEVEL_OUTOF10: 4
PAINLEVEL_OUTOF10: 10

## 2024-12-14 ASSESSMENT — PAIN - FUNCTIONAL ASSESSMENT
PAIN_FUNCTIONAL_ASSESSMENT: PREVENTS OR INTERFERES SOME ACTIVE ACTIVITIES AND ADLS
PAIN_FUNCTIONAL_ASSESSMENT: PREVENTS OR INTERFERES SOME ACTIVE ACTIVITIES AND ADLS

## 2024-12-14 ASSESSMENT — PAIN DESCRIPTION - LOCATION
LOCATION: HIP
LOCATION: BUTTOCKS

## 2024-12-14 ASSESSMENT — PAIN DESCRIPTION - ORIENTATION
ORIENTATION: LEFT
ORIENTATION: LEFT

## 2024-12-14 NOTE — PLAN OF CARE
Problem: Discharge Planning  Goal: Discharge to home or other facility with appropriate resources  12/14/2024 1101 by Catrachita Casarez RN  Outcome: Progressing  Flowsheets (Taken 12/13/2024 2045 by Schuyler Giles, RN)  Discharge to home or other facility with appropriate resources: Identify barriers to discharge with patient and caregiver  12/14/2024 0100 by Schuyler Giles RN  Outcome: Progressing  Flowsheets (Taken 12/13/2024 2045)  Discharge to home or other facility with appropriate resources: Identify barriers to discharge with patient and caregiver     Problem: Safety - Adult  Goal: Free from fall injury  12/14/2024 1101 by Catrachita Casarez RN  Outcome: Progressing  Flowsheets (Taken 12/12/2024 1615 by Ivis Sin LPN)  Free From Fall Injury: Instruct family/caregiver on patient safety  12/14/2024 0100 by Schuyler Giles RN  Outcome: Progressing     Problem: ABCDS Injury Assessment  Goal: Absence of physical injury  12/14/2024 1101 by Catrachita Casarez RN  Outcome: Progressing  Flowsheets (Taken 12/12/2024 1615 by Ivis Sin LPN)  Absence of Physical Injury: Implement safety measures based on patient assessment  12/14/2024 0100 by Schuyler Giles RN  Outcome: Progressing     Problem: Pain  Goal: Verbalizes/displays adequate comfort level or baseline comfort level  12/14/2024 1101 by Catrachita Casarez RN  Outcome: Progressing  Flowsheets (Taken 12/13/2024 2045 by Schuyler Giles, RN)  Verbalizes/displays adequate comfort level or baseline comfort level: Encourage patient to monitor pain and request assistance  12/14/2024 0100 by Schuyler Giles RN  Outcome: Progressing  Flowsheets (Taken 12/13/2024 2045)  Verbalizes/displays adequate comfort level or baseline comfort level: Encourage patient to monitor pain and request assistance     Problem: Skin/Tissue Integrity  Goal: Absence of new skin breakdown  Description: 1.  Monitor for areas of

## 2024-12-14 NOTE — PROGRESS NOTES
[facility]  INPATIENT PHYSICAL THERAPY  DAILY NOTE  [facility] - 8K-04/004-A      Discharge Recommendations: Continue to assess pending progress  Equipment Recommendations: No (Continue to assess pending progress (Patient has RW and cane may require manual wheelchair))               Time In: 733  Time Out: 903  Timed Code Treatment Minutes: 90 Minutes  Minutes: 90          Date: 2024  Patient Name: Gonzales Sheehan,  Gender:  male        MRN: [id number]  : 1940  (84 y.o.)     Referring Practitioner: Monserrat Hanson APRN - CNP (ordering); Priti Arreola DO (attending)  Diagnosis: Fall at home, initial encounter  Additional Pertinent Hx: Gonzales Sheehan is a 84 y.o. male admitted to [facility] on 2024. Patient  has a past medical history of AMD (age-related macular degeneration), wet ([facility]), Bradycardia, Enlarged prostate, H/O cardiovascular stress test, H/O echocardiogram, History of Holter monitoring, Hyperlipidemia, Other and unspecified hyperlipidemia, PAF (paroxysmal atrial fibrillation) ([facility]), Psychosexual dysfunction with inhibited sexual excitement, and Unspecified hyperplasia of prostate without urinary obstruction and other lower urinary tract symptoms (LUTS).  Patient presented to [facility] from [facility] after suffering a fall downstairs.  Patient reported no loss of consciousness.  Patient reported getting up to use the restroom around 1:30 in the morning, he lost his balance, and fell down the stairway.  Denied head strike.  Wife was able to assist him and reportedly was moving him around the house his sheets.  Son arrived the following day and patient was unable to ambulate, he was taken to the ER for evaluation and found to have multiple pelvic fractures with associated hemorrhage and spinal fractures.  Patient on Eliquis for A-fib.  Patient's main complaint with left-sided pelvic pain and groin pain.  Patient was

## 2024-12-14 NOTE — CARE COORDINATION
Patient educated on how to use incentive spirometer. Patient verbalized understanding and demonstrated proper use. Emphasized importance and usage of device, with coughing and deep breathing every 4 hours while awake. Patient participated in all therapies during shift. Family to visit patient at lunch time.

## 2024-12-14 NOTE — PROGRESS NOTES
[facility]  Occupational Therapy  Daily Note    Discharge Recommendations: Continue to assess pending progress  Equipment Recommendations: Yes      Time In: 1340  Time Out: 1510  Timed Code Treatment Minutes: 90 Minutes  Minutes: 90          Date: 2024  Patient Name: Gonzales Sheehan,   Gender: male      Room: [facility]04/004-A  MRN: [id number]  : 1940  (84 y.o.)  Referring Practitioner: Monserrat Hanson APRN - CNP (ordering); Priti Arreola DO (attending)  Diagnosis: Fall at home, initial encounter  Additional Pertinent Hx: Gonzales Sheehan is a 84 y.o. male admitted to [facility] on 2024. Patient  has a past medical history of AMD (age-related macular degeneration), wet ([facility]), Bradycardia, Enlarged prostate, H/O cardiovascular stress test, H/O echocardiogram, History of Holter monitoring, Hyperlipidemia, Other and unspecified hyperlipidemia, PAF (paroxysmal atrial fibrillation) ([facility]), Psychosexual dysfunction with inhibited sexual excitement, and Unspecified hyperplasia of prostate without urinary obstruction and other lower urinary tract symptoms (LUTS).  Patient presented to [facility] from [facility] after suffering a fall downstairs.  Patient reported no loss of consciousness.  Patient reported getting up to use the restroom around 1:30 in the morning, he lost his balance, and fell down the stairway.  Denied head strike.  Wife was able to assist him and reportedly was moving him around the house his sheets.  Son arrived the following day and patient was unable to ambulate, he was taken to the ER for evaluation and found to have multiple pelvic fractures with associated hemorrhage and spinal fractures.  Patient on Eliquis for A-fib.  Patient's main complaint with left-sided pelvic pain and groin pain.  Patient was admitted.  Repeat CT abdomen pelvis at [facility] reported fractures of the left sacral kris, comminuted and mildly  attempted to have pt push grocery cart throughout, although demoed decreased stability and limited ability to safely take steps, therefore transitioned to use of RW while OTR carrying basket. Pt required minimal assistance throughout for standing balance. Pt able to tolerate standing X3 minutes, X5 minutes. Pt required modreate cues for safe placement of RW to decrease reach required when reaching outside ASHLEE. Pt was able to safely turn to place items in basket, although required cues to lessen distance between self and basket. Education provided re: use of reacher to retrieve items far out of reach in high/low surfaces, and pt able to return demo with improving stability noted with use of reacher.     BALANCE:  Sitting Balance:  Stand By Assistance.    Standing Balance: Contact Guard Assistance. For static; minimal assistance for dynamic    BED MOBILITY:  Not Tested    TRANSFERS:  Sit to Stand:  Contact Guard Assistance, with increased time for completion, cues for hand placement. ; increased time to transition hands from armrests of chair up to RW  Stand to Sit: Contact Guard Assistance, with increased time for completion, cues for hand placement.      FUNCTIONAL MOBILITY:  Assistive Device: Rolling Walker  Assist Level:  Minimal Assistance, with verbal cues , and with increased time for completion.   Distance: To and from bathroom and short distance within grocery store  Cuing required for posture and ensuring toes fully clearing floor when advancing to take steps. As fatigue increased, pt demoed more difficulty fully advancing/clearing L toes when taking steps, in addition to increased shakiness/muscle fatigue noted.      ADDITIONAL ACTIVITIES:  Pt completed BUE minimal resistance theraband exercises while supine in bed. Pt completed 1 set X 15 repetitions in all planes with short rest breaks in between variations. Exercises completed to increase overall strength/endurance needed for ADLs and transfers. Copy of

## 2024-12-14 NOTE — PLAN OF CARE
Problem: Discharge Planning  Goal: Discharge to home or other facility with appropriate resources  Outcome: Progressing  Flowsheets (Taken 12/13/2024 2045)  Discharge to home or other facility with appropriate resources: Identify barriers to discharge with patient and caregiver     Problem: Safety - Adult  Goal: Free from fall injury  Outcome: Progressing     Problem: ABCDS Injury Assessment  Goal: Absence of physical injury  Outcome: Progressing     Problem: Pain  Goal: Verbalizes/displays adequate comfort level or baseline comfort level  Outcome: Progressing  Flowsheets (Taken 12/13/2024 2045)  Verbalizes/displays adequate comfort level or baseline comfort level: Encourage patient to monitor pain and request assistance     Problem: Skin/Tissue Integrity  Goal: Absence of new skin breakdown  Description: 1.  Monitor for areas of redness and/or skin breakdown  2.  Assess vascular access sites hourly  3.  Every 4-6 hours minimum:  Change oxygen saturation probe site  4.  Every 4-6 hours:  If on nasal continuous positive airway pressure, respiratory therapy assess nares and determine need for appliance change or resting period.  Outcome: Progressing

## 2024-12-14 NOTE — PROGRESS NOTES
Patient: Gonzales Sheehan  Unit/Bed: 8K-04/004-A  YOB: 1940  MRN: [id number] Acct: [id number]   Admitting Diagnosis: Fall at home, initial encounter [W19.XXXA, Y92.009]  Admit Date:  12/10/2024  Hospital Day: 3    Assessment:     Principal Problem:    Fall at home, initial encounter  Resolved Problems:    * No resolved hospital problems. *      Plan:     Begin some Magox for the continued low magnesium.        Subjective:     Patient has no complaint of CP or SOB..   Medication side effects: none    Scheduled Meds:   magnesium oxide  400 mg Oral BID    acetaminophen  1,000 mg Oral 3 times per day    Vitamin D  5,000 Units Oral Daily    [Held by provider] tamsulosin  0.4 mg Oral Daily    finasteride  5 mg Oral Daily    docusate sodium  100 mg Oral BID    atorvastatin  40 mg Oral Nightly    apixaban  5 mg Oral BID    sodium chloride flush  5-40 mL IntraVENous 2 times per day    senna  2 tablet Oral Nightly    polyethylene glycol  17 g Oral Daily    famotidine  20 mg Oral BID    ferrous sulfate  325 mg Oral BID WC    ascorbic acid  500 mg Oral BID WC    calcium elemental  500 mg Oral BID WC     Continuous Infusions:   sodium chloride       PRN Meds:calcium carbonate, traMADol **OR** traMADol, ondansetron, cyclobenzaprine, sodium chloride flush, potassium chloride **OR** potassium chloride, magnesium sulfate, bisacodyl, sodium chloride, Menthol    Review of Systems  Pertinent items are noted in HPI.    Objective:     Patient Vitals for the past 8 hrs:   BP Temp Temp src Pulse Resp SpO2   12/13/24 2045 98/60 97.9 °F (36.6 °C) Oral 81 18 98 %   12/13/24 1523 -- -- -- -- 16 --   12/13/24 1453 -- -- -- -- 18 --     I/O last 3 completed shifts:  In: 1340 [P.O.:1340]  Out: 300 [Urine:300]  I/O this shift:  In: 240 [P.O.:240]  Out: -     BP 98/60   Pulse 81   Temp 97.9 °F (36.6 °C) (Oral)   Resp 18   Ht 1.88 m (6' 2.02\")   Wt 98.2 kg (216 lb 7.9 oz)   SpO2 98%   BMI 27.78 kg/m²     General appearance:  alert, appears stated age, and cooperative  Head: Normocephalic, without obvious abnormality, atraumatic  Lungs: clear to auscultation bilaterally  Chest wall: no tenderness  Heart: regular rate and rhythm, S1, S2 normal, no murmur, click, rub or gallop  Abdomen: soft, non-tender; bowel sounds normal; no masses,  no organomegaly  Extremities: extremities normal, atraumatic, no cyanosis or edema  Skin: Skin color, texture, turgor normal. No rashes or lesions  Neurologic:  weak    Data Review:    Latest Reference Range & Units 12/13/24 06:32   Magnesium 1.6 - 2.4 mg/dL 1.7       Electronically signed by Chinedu Rendon MD on 12/13/2024 at 9:46 PM

## 2024-12-15 PROCEDURE — 6370000000 HC RX 637 (ALT 250 FOR IP): Performed by: STUDENT IN AN ORGANIZED HEALTH CARE EDUCATION/TRAINING PROGRAM

## 2024-12-15 PROCEDURE — 6370000000 HC RX 637 (ALT 250 FOR IP): Performed by: FAMILY MEDICINE

## 2024-12-15 PROCEDURE — 6370000000 HC RX 637 (ALT 250 FOR IP): Performed by: NURSE PRACTITIONER

## 2024-12-15 PROCEDURE — 51701 INSERT BLADDER CATHETER: CPT

## 2024-12-15 PROCEDURE — 1180000000 HC REHAB R&B

## 2024-12-15 PROCEDURE — 51798 US URINE CAPACITY MEASURE: CPT

## 2024-12-15 RX ADMIN — CALCIUM 500 MG: 500 TABLET ORAL at 17:06

## 2024-12-15 RX ADMIN — TRAMADOL HYDROCHLORIDE 100 MG: 50 TABLET, COATED ORAL at 21:29

## 2024-12-15 RX ADMIN — ATORVASTATIN CALCIUM 40 MG: 40 TABLET, FILM COATED ORAL at 21:30

## 2024-12-15 RX ADMIN — TRAMADOL HYDROCHLORIDE 100 MG: 50 TABLET, COATED ORAL at 11:05

## 2024-12-15 RX ADMIN — CALCIUM 500 MG: 500 TABLET ORAL at 08:57

## 2024-12-15 RX ADMIN — ACETAMINOPHEN 1000 MG: 500 TABLET ORAL at 14:17

## 2024-12-15 RX ADMIN — Medication 5000 UNITS: at 08:57

## 2024-12-15 RX ADMIN — ACETAMINOPHEN 1000 MG: 500 TABLET ORAL at 06:01

## 2024-12-15 RX ADMIN — Medication 400 MG: at 08:57

## 2024-12-15 RX ADMIN — OXYCODONE HYDROCHLORIDE AND ACETAMINOPHEN 500 MG: 500 TABLET ORAL at 17:07

## 2024-12-15 RX ADMIN — TAMSULOSIN HYDROCHLORIDE 0.4 MG: 0.4 CAPSULE ORAL at 21:31

## 2024-12-15 RX ADMIN — DOCUSATE SODIUM 100 MG: 100 CAPSULE, LIQUID FILLED ORAL at 21:30

## 2024-12-15 RX ADMIN — APIXABAN 5 MG: 5 TABLET, FILM COATED ORAL at 21:30

## 2024-12-15 RX ADMIN — APIXABAN 5 MG: 5 TABLET, FILM COATED ORAL at 08:57

## 2024-12-15 RX ADMIN — Medication 400 MG: at 21:31

## 2024-12-15 RX ADMIN — FINASTERIDE 5 MG: 5 TABLET, FILM COATED ORAL at 21:30

## 2024-12-15 RX ADMIN — FAMOTIDINE 20 MG: 20 TABLET, FILM COATED ORAL at 08:57

## 2024-12-15 RX ADMIN — SENNOSIDES 17.2 MG: 8.6 TABLET ORAL at 21:30

## 2024-12-15 RX ADMIN — FERROUS SULFATE TAB 325 MG (65 MG ELEMENTAL FE) 325 MG: 325 (65 FE) TAB at 08:57

## 2024-12-15 RX ADMIN — FERROUS SULFATE TAB 325 MG (65 MG ELEMENTAL FE) 325 MG: 325 (65 FE) TAB at 17:06

## 2024-12-15 RX ADMIN — ACETAMINOPHEN 1000 MG: 500 TABLET ORAL at 21:30

## 2024-12-15 RX ADMIN — FAMOTIDINE 20 MG: 20 TABLET, FILM COATED ORAL at 21:31

## 2024-12-15 RX ADMIN — TRAMADOL HYDROCHLORIDE 50 MG: 50 TABLET, COATED ORAL at 03:26

## 2024-12-15 RX ADMIN — OXYCODONE HYDROCHLORIDE AND ACETAMINOPHEN 500 MG: 500 TABLET ORAL at 08:57

## 2024-12-15 ASSESSMENT — PAIN DESCRIPTION - LOCATION
LOCATION: HIP
LOCATION: BACK

## 2024-12-15 ASSESSMENT — PAIN DESCRIPTION - ORIENTATION
ORIENTATION: LOWER
ORIENTATION: LEFT
ORIENTATION: LOWER
ORIENTATION: LOWER

## 2024-12-15 ASSESSMENT — PAIN SCALES - GENERAL
PAINLEVEL_OUTOF10: 6
PAINLEVEL_OUTOF10: 7
PAINLEVEL_OUTOF10: 0
PAINLEVEL_OUTOF10: 3
PAINLEVEL_OUTOF10: 10
PAINLEVEL_OUTOF10: 2
PAINLEVEL_OUTOF10: 5

## 2024-12-15 ASSESSMENT — PAIN DESCRIPTION - DESCRIPTORS
DESCRIPTORS: ACHING
DESCRIPTORS: ACHING;STABBING
DESCRIPTORS: ACHING

## 2024-12-15 ASSESSMENT — PAIN - FUNCTIONAL ASSESSMENT
PAIN_FUNCTIONAL_ASSESSMENT: ACTIVITIES ARE NOT PREVENTED
PAIN_FUNCTIONAL_ASSESSMENT: PREVENTS OR INTERFERES SOME ACTIVE ACTIVITIES AND ADLS

## 2024-12-15 NOTE — PLAN OF CARE
Problem: Discharge Planning  Goal: Discharge to home or other facility with appropriate resources  Outcome: Progressing  Flowsheets (Taken 12/14/2024 1945 by Schuyler Giles, RN)  Discharge to home or other facility with appropriate resources: Identify barriers to discharge with patient and caregiver     Problem: Safety - Adult  Goal: Free from fall injury  Outcome: Progressing  Flowsheets (Taken 12/12/2024 1615 by Ivis Sin LPN)  Free From Fall Injury: Instruct family/caregiver on patient safety     Problem: ABCDS Injury Assessment  Goal: Absence of physical injury  Outcome: Progressing  Flowsheets (Taken 12/12/2024 1615 by Ivis Sin LPN)  Absence of Physical Injury: Implement safety measures based on patient assessment     Problem: Pain  Goal: Verbalizes/displays adequate comfort level or baseline comfort level  Outcome: Progressing  Flowsheets (Taken 12/14/2024 1945 by Schuyler Giles, RN)  Verbalizes/displays adequate comfort level or baseline comfort level: Encourage patient to monitor pain and request assistance     Problem: Skin/Tissue Integrity  Goal: Absence of new skin breakdown  Description: 1.  Monitor for areas of redness and/or skin breakdown  2.  Assess vascular access sites hourly  3.  Every 4-6 hours minimum:  Change oxygen saturation probe site  4.  Every 4-6 hours:  If on nasal continuous positive airway pressure, respiratory therapy assess nares and determine need for appliance change or resting period.  Outcome: Progressing     Problem: Chronic Conditions and Co-morbidities  Goal: Patient's chronic conditions and co-morbidity symptoms are monitored and maintained or improved  Outcome: Progressing  Flowsheets (Taken 12/14/2024 1945 by Schuyler Giles, RN)  Care Plan - Patient's Chronic Conditions and Co-Morbidity Symptoms are Monitored and Maintained or Improved: Monitor and assess patient's chronic conditions and comorbid symptoms for stability, deterioration,

## 2024-12-15 NOTE — CARE COORDINATION
Patient educated on how to use incentive spirometer. Patient verbalized understanding and demonstrated proper use. Emphasized importance and usage of device, with coughing and deep breathing every 4 hours while awake. Patient expressed that he was able to use urinal after straight cath in the morning. Flomax resumed this evening with night shift nurse. Patient pleasant and cooperative.

## 2024-12-15 NOTE — PLAN OF CARE
Problem: Discharge Planning  Goal: Discharge to home or other facility with appropriate resources  12/14/2024 2149 by Schuyler Giles, RN  Outcome: Progressing  Flowsheets (Taken 12/14/2024 1945)  Discharge to home or other facility with appropriate resources: Identify barriers to discharge with patient and caregiver     Problem: Safety - Adult  Goal: Free from fall injury  12/14/2024 2149 by Schuyler Giles, RN  Outcome: Progressing     Problem: ABCDS Injury Assessment  Goal: Absence of physical injury  12/14/2024 2149 by Schuyler Giles, RN  Outcome: Progressing     Problem: Pain  Goal: Verbalizes/displays adequate comfort level or baseline comfort level  12/14/2024 2149 by Schuyler Giles, RN  Outcome: Progressing  Flowsheets (Taken 12/14/2024 1945)  Verbalizes/displays adequate comfort level or baseline comfort level: Encourage patient to monitor pain and request assistance     Problem: Skin/Tissue Integrity  Goal: Absence of new skin breakdown  Description: 1.  Monitor for areas of redness and/or skin breakdown  2.  Assess vascular access sites hourly  3.  Every 4-6 hours minimum:  Change oxygen saturation probe site  4.  Every 4-6 hours:  If on nasal continuous positive airway pressure, respiratory therapy assess nares and determine need for appliance change or resting period.  12/14/2024 2149 by Schuyler Giles, RN  Outcome: Progressing

## 2024-12-15 NOTE — CARE COORDINATION
Patient rested well last night. He was able to have BM x2. He is continent of bowel and bladder. To the bathroom via 1 assist via walker, tolerated well. Mild pain was reported by the pt on his left hip, med prn given. V/S within normal limits. Electronically signed by Schuyler Giles RN on 12/15/2024 at 4:21 AM

## 2024-12-16 PROCEDURE — 97530 THERAPEUTIC ACTIVITIES: CPT

## 2024-12-16 PROCEDURE — 51798 US URINE CAPACITY MEASURE: CPT

## 2024-12-16 PROCEDURE — 6370000000 HC RX 637 (ALT 250 FOR IP): Performed by: NURSE PRACTITIONER

## 2024-12-16 PROCEDURE — 99232 SBSQ HOSP IP/OBS MODERATE 35: CPT | Performed by: STUDENT IN AN ORGANIZED HEALTH CARE EDUCATION/TRAINING PROGRAM

## 2024-12-16 PROCEDURE — 97112 NEUROMUSCULAR REEDUCATION: CPT

## 2024-12-16 PROCEDURE — 97110 THERAPEUTIC EXERCISES: CPT

## 2024-12-16 PROCEDURE — 6370000000 HC RX 637 (ALT 250 FOR IP): Performed by: STUDENT IN AN ORGANIZED HEALTH CARE EDUCATION/TRAINING PROGRAM

## 2024-12-16 PROCEDURE — 6370000000 HC RX 637 (ALT 250 FOR IP): Performed by: FAMILY MEDICINE

## 2024-12-16 PROCEDURE — 97116 GAIT TRAINING THERAPY: CPT

## 2024-12-16 PROCEDURE — 97535 SELF CARE MNGMENT TRAINING: CPT

## 2024-12-16 PROCEDURE — 1180000000 HC REHAB R&B

## 2024-12-16 PROCEDURE — 51701 INSERT BLADDER CATHETER: CPT

## 2024-12-16 RX ADMIN — OXYCODONE HYDROCHLORIDE AND ACETAMINOPHEN 500 MG: 500 TABLET ORAL at 08:30

## 2024-12-16 RX ADMIN — ACETAMINOPHEN 1000 MG: 500 TABLET ORAL at 23:13

## 2024-12-16 RX ADMIN — FAMOTIDINE 20 MG: 20 TABLET, FILM COATED ORAL at 08:30

## 2024-12-16 RX ADMIN — FAMOTIDINE 20 MG: 20 TABLET, FILM COATED ORAL at 20:18

## 2024-12-16 RX ADMIN — TAMSULOSIN HYDROCHLORIDE 0.4 MG: 0.4 CAPSULE ORAL at 20:19

## 2024-12-16 RX ADMIN — Medication 5000 UNITS: at 08:30

## 2024-12-16 RX ADMIN — ACETAMINOPHEN 1000 MG: 500 TABLET ORAL at 14:22

## 2024-12-16 RX ADMIN — CYCLOBENZAPRINE 10 MG: 10 TABLET, FILM COATED ORAL at 06:01

## 2024-12-16 RX ADMIN — ACETAMINOPHEN 1000 MG: 500 TABLET ORAL at 05:57

## 2024-12-16 RX ADMIN — SENNOSIDES 17.2 MG: 8.6 TABLET ORAL at 20:18

## 2024-12-16 RX ADMIN — TRAMADOL HYDROCHLORIDE 100 MG: 50 TABLET, COATED ORAL at 17:00

## 2024-12-16 RX ADMIN — CYCLOBENZAPRINE 10 MG: 10 TABLET, FILM COATED ORAL at 20:20

## 2024-12-16 RX ADMIN — Medication 400 MG: at 20:19

## 2024-12-16 RX ADMIN — ATORVASTATIN CALCIUM 40 MG: 40 TABLET, FILM COATED ORAL at 20:19

## 2024-12-16 RX ADMIN — TRAMADOL HYDROCHLORIDE 100 MG: 50 TABLET, COATED ORAL at 03:29

## 2024-12-16 RX ADMIN — TRAMADOL HYDROCHLORIDE 100 MG: 50 TABLET, COATED ORAL at 23:12

## 2024-12-16 RX ADMIN — CALCIUM 500 MG: 500 TABLET ORAL at 17:00

## 2024-12-16 RX ADMIN — FINASTERIDE 5 MG: 5 TABLET, FILM COATED ORAL at 20:19

## 2024-12-16 RX ADMIN — FERROUS SULFATE TAB 325 MG (65 MG ELEMENTAL FE) 325 MG: 325 (65 FE) TAB at 08:30

## 2024-12-16 RX ADMIN — OXYCODONE HYDROCHLORIDE AND ACETAMINOPHEN 500 MG: 500 TABLET ORAL at 17:00

## 2024-12-16 RX ADMIN — CALCIUM 500 MG: 500 TABLET ORAL at 08:30

## 2024-12-16 RX ADMIN — FERROUS SULFATE TAB 325 MG (65 MG ELEMENTAL FE) 325 MG: 325 (65 FE) TAB at 17:00

## 2024-12-16 RX ADMIN — APIXABAN 5 MG: 5 TABLET, FILM COATED ORAL at 08:30

## 2024-12-16 RX ADMIN — Medication 400 MG: at 08:30

## 2024-12-16 RX ADMIN — CYCLOBENZAPRINE 10 MG: 10 TABLET, FILM COATED ORAL at 14:22

## 2024-12-16 RX ADMIN — DOCUSATE SODIUM 100 MG: 100 CAPSULE, LIQUID FILLED ORAL at 20:19

## 2024-12-16 RX ADMIN — APIXABAN 5 MG: 5 TABLET, FILM COATED ORAL at 20:19

## 2024-12-16 RX ADMIN — TRAMADOL HYDROCHLORIDE 100 MG: 50 TABLET, COATED ORAL at 10:38

## 2024-12-16 ASSESSMENT — PAIN SCALES - GENERAL
PAINLEVEL_OUTOF10: 8
PAINLEVEL_OUTOF10: 6
PAINLEVEL_OUTOF10: 9
PAINLEVEL_OUTOF10: 9
PAINLEVEL_OUTOF10: 8
PAINLEVEL_OUTOF10: 10

## 2024-12-16 ASSESSMENT — PAIN DESCRIPTION - ORIENTATION
ORIENTATION: LEFT
ORIENTATION: LOWER;LEFT
ORIENTATION: LEFT
ORIENTATION: LEFT
ORIENTATION: MID;LOWER;LEFT

## 2024-12-16 ASSESSMENT — PAIN DESCRIPTION - LOCATION
LOCATION: HIP
LOCATION: HIP
LOCATION: BUTTOCKS
LOCATION: BUTTOCKS
LOCATION: HIP;LEG;BACK
LOCATION: BACK;LEG
LOCATION: HIP

## 2024-12-16 ASSESSMENT — PAIN DESCRIPTION - DESCRIPTORS
DESCRIPTORS: SHARP
DESCRIPTORS: SHARP
DESCRIPTORS: ACHING;DISCOMFORT;SHOOTING
DESCRIPTORS: ACHING
DESCRIPTORS: ACHING
DESCRIPTORS: ACHING;SHOOTING
DESCRIPTORS: ACHING;DISCOMFORT;SHOOTING

## 2024-12-16 ASSESSMENT — PAIN - FUNCTIONAL ASSESSMENT
PAIN_FUNCTIONAL_ASSESSMENT: PREVENTS OR INTERFERES SOME ACTIVE ACTIVITIES AND ADLS
PAIN_FUNCTIONAL_ASSESSMENT: PREVENTS OR INTERFERES WITH MANY ACTIVE NOT PASSIVE ACTIVITIES
PAIN_FUNCTIONAL_ASSESSMENT: ACTIVITIES ARE NOT PREVENTED
PAIN_FUNCTIONAL_ASSESSMENT: PREVENTS OR INTERFERES SOME ACTIVE ACTIVITIES AND ADLS
PAIN_FUNCTIONAL_ASSESSMENT: PREVENTS OR INTERFERES SOME ACTIVE ACTIVITIES AND ADLS

## 2024-12-16 NOTE — PROGRESS NOTES
admitted.  Repeat CT abdomen pelvis at [facility] reported fractures of the left sacral kris, comminuted and mildly displaced, pubic symphyseal diastasis with associated hemorrhage and edema in the parasymphyseal soft tissues and left groin region.  CT abdomen pelvis also showed a nondisplaced left L5 and L4 transverse process fractures along with a fracture of the superior S5 vertebral body, nondisplaced.  Also noted presacral fluid and edema likely representing hemorrhage.  Ortho was consulted, recommended weightbearing as tolerated bilateral lower extremities with a walker, closely monitoring hemoglobin, holding Eliquis day 1 but resuming as long as labs trending positively.  Ortho recommending follow-up with Dr. Ramirez in 2 to 3 weeks for repeat imaging.     Prior Level of Function:  Lives With: Spouse  Type of Home: House  Home Layout: Two level, Bed/Bath upstairs, Able to Live on Main level with bedroom/bathroom (bedroom upstairs, full bath downstairs - no bathroom upstairs. Able to move bedroom downstairs if needed)  Home Access: Stairs to enter with rails  Entrance Stairs - Number of Steps: 3 steps with a post  Home Equipment: Walker - Rolling, Cane   Bathroom Shower/Tub: Walk-in shower  Bathroom Toilet: Standard  Bathroom Equipment: Grab bars in shower  Bathroom Accessibility: Accessible    Prior Level of Assist for ADLs: Independent  Prior Level of Assist for Homemaking: Independent  Homemaking Responsibilities: Yes  Prior Level of Assist for Transfers: Independent  Active : Yes  IADL Comments: Pt was indep with all cooking, cleaning, laundry, ect.  Additional Comments: Patient reports he was independent prior to admission without use of AD. Patient spouse reports she is home most of the time however reports she is active in the community and reports she would be able to physically assist patient. Patient reports he has two sons and a 2 daughters that live nearby. Patient reports his children check in on  Exercise Instruction, Health Promotion and Wellness Education    Goals:  Patient Goals : \"To be able to get out of here\"  Short Term Goals  Time Frame for Short Term Goals: 1 week  Short Term Goal 1: Patient to perform supine<>sit with Minimal assistance in order to assist with getting into and out of bed.  Short Term Goal 2: Patient to perform sit<>stand transfers with use of RW and CGA in order to assist with safety with transfers in home.  Short Term Goal 3: Patient to ambulate >/=30 feet with use of RW and CGA in order to assist with home mobility.  Short Term Goal 4: Patient to perform bed<>chair transfers with use of RW and CGA in order to assist with transferring to/from various surfaces.  Short Term Goal 5: Patient to ascend/descend 1 step with B handrails and Minimal assistance in order to assist with progression to home entry.  Long Term Goals  Time Frame for Long Term Goals : 2 weeks from IPR evaluation  Long Term Goal 1: Patient to perform bed mobility supine<>sit with Mod I in order to assist with getting into and out of bed.  Long Term Goal 2: Patient to perform sit<>stand transfers with Mod I in order to assist with home mobility.  Long Term Goal 3: Patient to ambulate >/=100 feet with use of RW and Supervision in order to assist with home mobility.  Long Term Goal 4: Patient to perform car transfer with Supervision in order to assist with getting to and from appointments.  Long Term Goal 5: Patient to ascend/descend 3 steps with B handrails and Supervision in order to assist with home entry.  Additional Goals?: Yes  Long term goal 6: PT to assess Tinetti/TUG when able    Following session, patient left in safe position with all fall risk precautions in place.

## 2024-12-16 NOTE — CARE COORDINATION
Patient voided 125 ml, bladder scan completed, 741 ml noted, 700 ml of urine collected via straight cath. Patient tolerated well.

## 2024-12-16 NOTE — PROGRESS NOTES
functional mobility. Pt. Steady overall but relies heavily on RW with L SLS due to pain.      Pt. Also completed dynamic gait activity weaving through cones with RW to improve lateral weight shifting with directional changes during gait. Pt. Slightly unsteady at times requiring min A for stability.      Exercise:  Patient was guided in 1 set(s) 10 reps of exercises to both lower extremities: Glut sets, Seated marches, Seated hamstring curls, Seated heel/toe raises, Long arc quads, Seated isometric hip adduction, Seated abduction/adduction, and Abdominal isometrics. Pt. Also completed 2x5 repetitive sit<>stands from . Rest breaks required throughout. Exercises were completed for increased independence with functional mobility.     OT 12/16/2024:  ADL:   Grooming: Stand By Assistance.  Standing at sink in order to wash hand  Lower Extremity Dressing: Contact Guard Assistance, X 1, with verbal cues , and with increased time for completion.  Patient utilized reacher in order doff sweatpants.  Patient tore off brief.  Patient john brief/pants with reacher seated on toilet with verbal cues for technique to complete, demonstrating understanding.  Toileting: Minimal Assistance and X 1.  Thoroughness for rear pericare area  Toilet Transfer: Minimal Assistance, X 1, and with increased time for completion. Standard toilet, utilized GB, decreased forced production d/t pain in LLE .     IADL:   Laundry: Patient completed item retrieval and transportation activity with RW with CGA for safety to utilize reacher in order to retrieve towels/washcloths from floor level to address balance, strength, endurance, safety awareness, problem solving, maintaining spinal precautions, unilateral/bilateral release, activity tolerance to increase independence with ADLs/IADLs.  Patient able to tolerate x 3min prior to requiring seated rest break d/t increased pain and demonstrating minimal fatigue.  Patient able to complete remainder x 5min  °C) (Oral)   Resp 18   Ht 1.88 m (6' 2.02\")   Wt 101 kg (222 lb 10.6 oz)   SpO2 96%   BMI 28.58 kg/m²   Patient is awake and alert. Sitting up in bed. No family at bedside   Orientation: not reassessed today  Mood: within normal limits  Affect: calm  General appearance: Patient is well nourished, well developed, well groomed and in mild distress      Memory: Not formally assessed, however, patient is able to provide history  Attention/Concentration: He easily follows content of conversation  Language:  normal     Cranial Nerves:  cranial nerves II-XII are grossly intact  ROM:  abnormal -BLE related to pain  Motor Exam: Moving bilateral upper extremities easily against gravity. BLE: Moving antigravity with 5/5 ankle DF  Tone:  normal  Muscle bulk: within normal limits  Sensory:  Sensory intact to light touch  Gait: Not assessed     Heart: Well-perfused extremities, RRR, no m/r/g noted  Lungs: breathing comfortably on room air without increased work of breathing; CTAB  Abdomen: soft and non-distended  Skin: warm and dry, no rash or erythema      Diagnostics:   Recent Results (from the past 24 hour(s))   CBC with Auto Differential    Collection Time: 12/17/24  5:39 AM   Result Value Ref Range    WBC 9.2 4.8 - 10.8 thou/mm3    RBC 3.29 (L) 4.70 - 6.10 mill/mm3    Hemoglobin 10.3 (L) 14.0 - 18.0 gm/dl    Hematocrit 31.8 (L) 42.0 - 52.0 %    MCV 96.7 (H) 80.0 - 94.0 fL    MCH 31.3 26.0 - 33.0 pg    MCHC 32.4 32.2 - 35.5 gm/dl    RDW-CV 13.4 11.5 - 14.5 %    RDW-SD 47.7 (H) 35.0 - 45.0 fL    Platelets 368 130 - 400 thou/mm3    MPV 8.1 (L) 9.4 - 12.4 fL    Seg Neutrophils 60.2 %    Lymphocytes 20.0 %    Monocytes % 10.3 %    Eosinophils 8.4 %    Basophils 0.4 %    Immature Granulocytes % 0.7 %    Neutrophils Absolute 5.5 1.8 - 7.7 thou/mm3    Lymphocytes Absolute 1.8 1.0 - 4.8 thou/mm3    Monocytes Absolute 0.9 0.4 - 1.3 thou/mm3    Eosinophils Absolute 0.8 (H) 0.0 - 0.4 thou/mm3    Basophils Absolute 0.0 0.0 - 0.1

## 2024-12-16 NOTE — PROGRESS NOTES
Physical Medicine & Rehabilitation   Progress Note    Chief Complaint:  Pelvic/sacral fractures     Subjective:  Patient seen this morning. No acute events overnight.  He reports increased pain this morning in his left buttock/lower extremity.  He states that over the weekend his pain was overall well-controlled.  He and his wife attribute this to decreased activity more time in bed yesterday.  He denies any burning or electric sensation.  No reports of any changes to sensation.  He reports a bowel movement this morning.  Flomax was resumed due to urinary retention over the weekend.  Discussed with nursing to continue PVRs today.  He states the pain was okay when he got up and moved with therapy today.  He denies any dizziness or lightheadedness when up with therapy.      Rehabilitation:  PT 12/14/2024:  Bed Mobility:  Rolling to Left: Contact Guard Assistance, with head of bed flat, without rail, with verbal cues , with increased time for completion   Rolling to Right: Contact Guard Assistance, with head of bed raised, with rail, with verbal cues , with increased time for completion   Supine to Sit: Moderate Assistance, X 1, with head of bed flat, with rail, without rail (bed and mat table)   Sit to Supine: Moderate Assistance, X 1, with head of bed flat      Transfers:  Sit to Stand: Contact Guard Assistance, Minimal Assistance, X 1, cues for hand placement, with verbal cues for anterior weight shift to improve technique.  Stand to Sit:Contact Guard Assistance, cues for hand placement, with verbal cues, for anterior translation to improve eccentric control   To/From Bed and Chair: Contact Guard Assistance using RW      -Completed 5 reps of STS to/from WC to work on technique, improved with reps and vc's.      Ambulation:  Contact Guard Assistance, with verbal cues , with increased time for completion  Distance: 50'x1; 3-4'x2; 6'x1   Surface: Level Tile  Device: Rolling Walker  Gait Deviations: Forward Flexed  complete on own time 1-2x throughout the day.        Review of Systems:  CONSTITUTIONAL:  negative for  fevers and chills  EYES:  negative for  visual disturbance  HEENT:  negative for  nasal congestion and sore throat  RESPIRATORY:  negative for  dyspnea and wheezing  CARDIOVASCULAR:  negative for  chest pain, palpitations  GASTROINTESTINAL:  negative for nausea, vomiting, and diarrhea; positive for constipation  GENITOURINARY:  negative for dysuria; per patient, positive for overactive bladder  SKIN:  negative for rash  MUSCULOSKELETAL:  positive for  arthralgias, pain, decreased range of motion, and bone pain  NEUROLOGICAL:  positive for gait problems, negative for numbness or tingling  BEHAVIOR/PSYCH:  negative  10 point system review otherwise negative      Objective:  BP (!) 145/71   Pulse 66   Temp 97.8 °F (36.6 °C) (Oral)   Resp 18   Ht 1.88 m (6' 2.02\")   Wt 99.5 kg (219 lb 5.7 oz)   SpO2 99%   BMI 28.15 kg/m²   Patient is awake and alert. Sitting up in bedside chair with wife at bedside.   Orientation: not reassessed today  Mood: within normal limits  Affect: calm  General appearance: Patient is well nourished, well developed, well groomed and in mild distress      Memory: Not formally assessed, however, patient is able to provide history  Attention/Concentration: He easily follows content of conversation  Language:  normal     Cranial Nerves:  cranial nerves II-XII are grossly intact  ROM:  abnormal -BLE related to pain  Motor Exam: Moving bilateral upper extremities easily against gravity. BLE: Moving antigravity with 5/5 ankle DF  Tone:  normal  Muscle bulk: within normal limits  Sensory:  Sensory intact to light touch  Gait: Not assessed     Heart: Well-perfused extremities  Lungs: breathing comfortably on room air without increased work of breathing  Abdomen: soft and non-distended  Skin: warm and dry, no rash or erythema      Diagnostics:   No results found for this or any previous visit (from

## 2024-12-16 NOTE — PLAN OF CARE
Problem: Discharge Planning  Goal: Discharge to home or other facility with appropriate resources  Outcome: Progressing  Flowsheets (Taken 12/14/2024 1945 by Schuyler Giles, RN)  Discharge to home or other facility with appropriate resources: Identify barriers to discharge with patient and caregiver  Note: Patient voices no needs before discharge. Patient plans to be discharged to home on 12/24/24. Discharge planner working with patient to achieve optimal discharge plans, specific to individual needs.       Problem: Safety - Adult  Goal: Free from fall injury  12/16/2024 1256 by Roberta Smith RN  Outcome: Progressing  Flowsheets (Taken 12/16/2024 0653 by Anne Malin, RN)  Free From Fall Injury: Instruct family/caregiver on patient safety  Note: No falls were observed or reported so far this shift, uses walker when ambulating and wears non-skid slippers socks. Remains on fall precautions.       Problem: ABCDS Injury Assessment  Goal: Absence of physical injury  Outcome: Progressing  Flowsheets (Taken 12/12/2024 1615 by Ivis Sin LPN)  Absence of Physical Injury: Implement safety measures based on patient assessment     Problem: Pain  Goal: Verbalizes/displays adequate comfort level or baseline comfort level  12/16/2024 1256 by Roberta Smith RN  Outcome: Progressing  Flowsheets (Taken 12/16/2024 0810)  Verbalizes/displays adequate comfort level or baseline comfort level: Encourage patient to monitor pain and request assistance  Note: Pain Assessment: 0-10  Pain Level: 6   Patient's Stated Pain Goal: 5   Is pain goal met at this time?  No   Patient reports left hip, taking Tramadol as needed, reports decrease pain.   Non-Pharmaceutical Pain Intervention(s): Rest       Problem: Skin/Tissue Integrity  Goal: Absence of new skin breakdown  Description: 1.  Monitor for areas of redness and/or skin breakdown  2.  Assess vascular access sites hourly  3.  Every 4-6 hours minimum:  Change oxygen

## 2024-12-16 NOTE — PROGRESS NOTES
[facility]  Occupational Therapy  Daily Note    Discharge Recommendations: Continue to assess pending progress  Equipment Recommendations: Yes         Time In: 0900  Time Out: 1030  Timed Code Treatment Minutes: 90 Minutes  Minutes: 90          Date: 2024  Patient Name: Gonzales Sheehan,   Gender: male      Room: [facility]04/004-A  MRN: [id number]  : 1940  (84 y.o.)  Referring Practitioner: Monserrat Hanson APRN - CNP (ordering); Priti Arreola DO (attending)  Diagnosis: Fall at home, initial encounter  Additional Pertinent Hx: Gonzales Sheehan is a 84 y.o. male admitted to [facility] on 2024. 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  Patient presented to [facility] from [facility] after suffering a fall downstairs.  Patient reported no loss of consciousness.  Patient reported getting up to use the restroom around 1:30 in the morning, he lost his balance, and fell down the stairway.  Denied head strike.  Wife was able to assist him and reportedly was moving him around the house his sheets.  Son arrived the following day and patient was unable to ambulate, he was taken to the ER for evaluation and found to have multiple pelvic fractures with associated hemorrhage and spinal fractures.  Patient on Eliquis for A-fib.  Patient's main complaint with left-sided pelvic pain and groin pain.  Patient was admitted.  Repeat CT abdomen pelvis at [facility] reported fractures of the left sacral kris, comminuted and mildly  training, Equipment evaluation, education, & procurement, Safety education & training, Functional mobility training    Education:  Learners: Patient  Role of OT, Plan of Care, ADL's, IADL's, Precautions, Equipment Education, Home Safety, Importance of Increasing Activity, Fall Prevention, and Assistive Device Safety    Goals  Short Term Goals  Time Frame for Short Term Goals: 1 week  Short Term Goal 1: Pt will safely navigate environment to complete item retrieval & transport of >/= 5 items with CGA and no VC for safety to increase indep with ADLs  Short Term Goal 2: Pt will tolerate dynamic standing x5min with CGA and 1-2 UE release to increase indep with grooming  Short Term Goal 3: Pt will complete LB ADLs with LHAE prn and no > than Min A for increased indep with dressing  Short Term Goal 4: Pt will safely complete simple homemaking task with Min A and min VC for technique and adaptations to increase indep with heating a meal up  Short Term Goal 5: Pt will verbalize and incorporate >/= 3 fall prevention techniques during IADLs to increase safety during daily routine  Long Term Goals  Time Frame for Long Term Goals : 2 weeks from eval  Long Term Goal 1: Pt will complete BADL routine with Mod I and adaptations prn to maintain spinal precautions to increase indep with self care  Long Term Goal 2: Pt will complete multi-step homemaking task with Supervision and adaptations prn while maintaining spinal precautions to increase indep with meal prep  Long Term Goal 3: Pt will engage in and complete community re-entry task with Supervision and no safety cues to increase indep with getting groceries  Long Term Goal 4: Pt will demo improved occupational performance as evidenced by modifed barthel index of 91/100    Following session, patient left in safe position with all fall risk precautions in place.

## 2024-12-17 LAB
ANION GAP SERPL CALC-SCNC: 9 MEQ/L (ref 8–16)
BACTERIA URNS QL MICRO: ABNORMAL /HPF
BASOPHILS ABSOLUTE: 0 THOU/MM3 (ref 0–0.1)
BASOPHILS NFR BLD AUTO: 0.4 %
BILIRUB UR QL STRIP.AUTO: NEGATIVE
BUN SERPL-MCNC: 23 MG/DL (ref 7–22)
CALCIUM SERPL-MCNC: 8.9 MG/DL (ref 8.5–10.5)
CASTS #/AREA URNS LPF: ABNORMAL /LPF
CASTS 2: ABNORMAL /LPF
CHARACTER UR: CLEAR
CHLORIDE SERPL-SCNC: 104 MEQ/L (ref 98–111)
CO2 SERPL-SCNC: 25 MEQ/L (ref 23–33)
COLOR, UA: YELLOW
CREAT SERPL-MCNC: 0.9 MG/DL (ref 0.4–1.2)
CRYSTALS URNS MICRO: ABNORMAL
DEPRECATED RDW RBC AUTO: 47.7 FL (ref 35–45)
EOSINOPHIL NFR BLD AUTO: 8.4 %
EOSINOPHILS ABSOLUTE: 0.8 THOU/MM3 (ref 0–0.4)
EPITHELIAL CELLS, UA: ABNORMAL /HPF
ERYTHROCYTE [DISTWIDTH] IN BLOOD BY AUTOMATED COUNT: 13.4 % (ref 11.5–14.5)
GFR SERPL CREATININE-BSD FRML MDRD: 83 ML/MIN/1.73M2
GLUCOSE SERPL-MCNC: 91 MG/DL (ref 70–108)
GLUCOSE UR QL STRIP.AUTO: NEGATIVE MG/DL
HCT VFR BLD AUTO: 31.8 % (ref 42–52)
HGB BLD-MCNC: 10.3 GM/DL (ref 14–18)
HGB UR QL STRIP.AUTO: ABNORMAL
IMM GRANULOCYTES # BLD AUTO: 0.06 THOU/MM3 (ref 0–0.07)
IMM GRANULOCYTES NFR BLD AUTO: 0.7 %
KETONES UR QL STRIP.AUTO: NEGATIVE
LYMPHOCYTES ABSOLUTE: 1.8 THOU/MM3 (ref 1–4.8)
LYMPHOCYTES NFR BLD AUTO: 20 %
MCH RBC QN AUTO: 31.3 PG (ref 26–33)
MCHC RBC AUTO-ENTMCNC: 32.4 GM/DL (ref 32.2–35.5)
MCV RBC AUTO: 96.7 FL (ref 80–94)
MISCELLANEOUS 2: ABNORMAL
MONOCYTES ABSOLUTE: 0.9 THOU/MM3 (ref 0.4–1.3)
MONOCYTES NFR BLD AUTO: 10.3 %
NEUTROPHILS ABSOLUTE: 5.5 THOU/MM3 (ref 1.8–7.7)
NEUTROPHILS NFR BLD AUTO: 60.2 %
NITRITE UR QL STRIP: NEGATIVE
NRBC BLD AUTO-RTO: 0 /100 WBC
PH UR STRIP.AUTO: 6 [PH] (ref 5–9)
PLATELET # BLD AUTO: 368 THOU/MM3 (ref 130–400)
PMV BLD AUTO: 8.1 FL (ref 9.4–12.4)
POTASSIUM SERPL-SCNC: 4.3 MEQ/L (ref 3.5–5.2)
PROT UR STRIP.AUTO-MCNC: NEGATIVE MG/DL
RBC # BLD AUTO: 3.29 MILL/MM3 (ref 4.7–6.1)
RBC URINE: ABNORMAL /HPF
RENAL EPI CELLS #/AREA URNS HPF: ABNORMAL /[HPF]
SODIUM SERPL-SCNC: 138 MEQ/L (ref 135–145)
SP GR UR REFRACT.AUTO: 1.02 (ref 1–1.03)
UROBILINOGEN, URINE: 0.2 EU/DL (ref 0–1)
WBC # BLD AUTO: 9.2 THOU/MM3 (ref 4.8–10.8)
WBC #/AREA URNS HPF: ABNORMAL /HPF
WBC #/AREA URNS HPF: NEGATIVE /[HPF]
YEAST LIKE FUNGI URNS QL MICRO: ABNORMAL

## 2024-12-17 PROCEDURE — 6370000000 HC RX 637 (ALT 250 FOR IP): Performed by: NURSE PRACTITIONER

## 2024-12-17 PROCEDURE — 51798 US URINE CAPACITY MEASURE: CPT

## 2024-12-17 PROCEDURE — 6370000000 HC RX 637 (ALT 250 FOR IP): Performed by: FAMILY MEDICINE

## 2024-12-17 PROCEDURE — 97112 NEUROMUSCULAR REEDUCATION: CPT

## 2024-12-17 PROCEDURE — 85025 COMPLETE CBC W/AUTO DIFF WBC: CPT

## 2024-12-17 PROCEDURE — 51701 INSERT BLADDER CATHETER: CPT

## 2024-12-17 PROCEDURE — 81001 URINALYSIS AUTO W/SCOPE: CPT

## 2024-12-17 PROCEDURE — 1180000000 HC REHAB R&B

## 2024-12-17 PROCEDURE — 80048 BASIC METABOLIC PNL TOTAL CA: CPT

## 2024-12-17 PROCEDURE — 97110 THERAPEUTIC EXERCISES: CPT

## 2024-12-17 PROCEDURE — 99232 SBSQ HOSP IP/OBS MODERATE 35: CPT | Performed by: STUDENT IN AN ORGANIZED HEALTH CARE EDUCATION/TRAINING PROGRAM

## 2024-12-17 PROCEDURE — 97530 THERAPEUTIC ACTIVITIES: CPT

## 2024-12-17 PROCEDURE — 97535 SELF CARE MNGMENT TRAINING: CPT

## 2024-12-17 PROCEDURE — 97116 GAIT TRAINING THERAPY: CPT

## 2024-12-17 PROCEDURE — 36415 COLL VENOUS BLD VENIPUNCTURE: CPT

## 2024-12-17 PROCEDURE — 6370000000 HC RX 637 (ALT 250 FOR IP): Performed by: STUDENT IN AN ORGANIZED HEALTH CARE EDUCATION/TRAINING PROGRAM

## 2024-12-17 RX ADMIN — FINASTERIDE 5 MG: 5 TABLET, FILM COATED ORAL at 21:19

## 2024-12-17 RX ADMIN — ACETAMINOPHEN 1000 MG: 500 TABLET ORAL at 21:17

## 2024-12-17 RX ADMIN — ACETAMINOPHEN 1000 MG: 500 TABLET ORAL at 12:51

## 2024-12-17 RX ADMIN — Medication 5000 UNITS: at 07:56

## 2024-12-17 RX ADMIN — CALCIUM 500 MG: 500 TABLET ORAL at 07:56

## 2024-12-17 RX ADMIN — TAMSULOSIN HYDROCHLORIDE 0.4 MG: 0.4 CAPSULE ORAL at 21:19

## 2024-12-17 RX ADMIN — ACETAMINOPHEN 1000 MG: 500 TABLET ORAL at 05:19

## 2024-12-17 RX ADMIN — OXYCODONE HYDROCHLORIDE AND ACETAMINOPHEN 500 MG: 500 TABLET ORAL at 17:57

## 2024-12-17 RX ADMIN — TRAMADOL HYDROCHLORIDE 100 MG: 50 TABLET, COATED ORAL at 10:25

## 2024-12-17 RX ADMIN — APIXABAN 5 MG: 5 TABLET, FILM COATED ORAL at 21:19

## 2024-12-17 RX ADMIN — FERROUS SULFATE TAB 325 MG (65 MG ELEMENTAL FE) 325 MG: 325 (65 FE) TAB at 17:57

## 2024-12-17 RX ADMIN — APIXABAN 5 MG: 5 TABLET, FILM COATED ORAL at 07:56

## 2024-12-17 RX ADMIN — CYCLOBENZAPRINE 10 MG: 10 TABLET, FILM COATED ORAL at 12:52

## 2024-12-17 RX ADMIN — SENNOSIDES 17.2 MG: 8.6 TABLET ORAL at 21:17

## 2024-12-17 RX ADMIN — FAMOTIDINE 20 MG: 20 TABLET, FILM COATED ORAL at 21:17

## 2024-12-17 RX ADMIN — TRAMADOL HYDROCHLORIDE 100 MG: 50 TABLET, COATED ORAL at 05:21

## 2024-12-17 RX ADMIN — FERROUS SULFATE TAB 325 MG (65 MG ELEMENTAL FE) 325 MG: 325 (65 FE) TAB at 07:56

## 2024-12-17 RX ADMIN — DOCUSATE SODIUM 100 MG: 100 CAPSULE, LIQUID FILLED ORAL at 21:17

## 2024-12-17 RX ADMIN — Medication 400 MG: at 21:19

## 2024-12-17 RX ADMIN — Medication 400 MG: at 07:56

## 2024-12-17 RX ADMIN — ATORVASTATIN CALCIUM 40 MG: 40 TABLET, FILM COATED ORAL at 21:19

## 2024-12-17 RX ADMIN — TRAMADOL HYDROCHLORIDE 50 MG: 50 TABLET, COATED ORAL at 17:57

## 2024-12-17 RX ADMIN — DOCUSATE SODIUM 100 MG: 100 CAPSULE, LIQUID FILLED ORAL at 07:56

## 2024-12-17 RX ADMIN — OXYCODONE HYDROCHLORIDE AND ACETAMINOPHEN 500 MG: 500 TABLET ORAL at 07:56

## 2024-12-17 RX ADMIN — CALCIUM 500 MG: 500 TABLET ORAL at 17:57

## 2024-12-17 RX ADMIN — FAMOTIDINE 20 MG: 20 TABLET, FILM COATED ORAL at 07:55

## 2024-12-17 ASSESSMENT — PAIN SCALES - GENERAL
PAINLEVEL_OUTOF10: 3
PAINLEVEL_OUTOF10: 6
PAINLEVEL_OUTOF10: 5
PAINLEVEL_OUTOF10: 3
PAINLEVEL_OUTOF10: 8
PAINLEVEL_OUTOF10: 7
PAINLEVEL_OUTOF10: 0
PAINLEVEL_OUTOF10: 0
PAINLEVEL_OUTOF10: 2
PAINLEVEL_OUTOF10: 0

## 2024-12-17 ASSESSMENT — PAIN DESCRIPTION - DESCRIPTORS
DESCRIPTORS: CRAMPING;ACHING;DISCOMFORT
DESCRIPTORS: SHARP

## 2024-12-17 ASSESSMENT — PAIN DESCRIPTION - LOCATION
LOCATION: HIP
LOCATION: BACK
LOCATION: BACK
LOCATION: HIP

## 2024-12-17 ASSESSMENT — PAIN DESCRIPTION - ORIENTATION
ORIENTATION: LEFT;LOWER
ORIENTATION: LEFT
ORIENTATION: LEFT

## 2024-12-17 ASSESSMENT — PAIN - FUNCTIONAL ASSESSMENT
PAIN_FUNCTIONAL_ASSESSMENT: PREVENTS OR INTERFERES WITH MANY ACTIVE NOT PASSIVE ACTIVITIES
PAIN_FUNCTIONAL_ASSESSMENT: PREVENTS OR INTERFERES SOME ACTIVE ACTIVITIES AND ADLS
PAIN_FUNCTIONAL_ASSESSMENT: PREVENTS OR INTERFERES SOME ACTIVE ACTIVITIES AND ADLS

## 2024-12-17 NOTE — PROGRESS NOTES
Patient: Gonzales Sheehan  Unit/Bed: 8K-04/004-A  YOB: 1940  MRN: [id number] Acct: [id number]   Admitting Diagnosis: Fall at home, initial encounter [W19.XXXA, Y92.009]  Admit Date:  12/10/2024  Hospital Day: 6    Assessment:     Principal Problem:    Fall at home, initial encounter  Resolved Problems:    * No resolved hospital problems. *      Plan:     Continue to follow        Subjective:     Patient has no complaint of CP or SOB..   Medication side effects: none    Scheduled Meds:   magnesium oxide  400 mg Oral BID    acetaminophen  1,000 mg Oral 3 times per day    Vitamin D  5,000 Units Oral Daily    tamsulosin  0.4 mg Oral Daily    finasteride  5 mg Oral Daily    docusate sodium  100 mg Oral BID    atorvastatin  40 mg Oral Nightly    apixaban  5 mg Oral BID    sodium chloride flush  5-40 mL IntraVENous 2 times per day    senna  2 tablet Oral Nightly    polyethylene glycol  17 g Oral Daily    famotidine  20 mg Oral BID    ferrous sulfate  325 mg Oral BID WC    ascorbic acid  500 mg Oral BID WC    calcium elemental  500 mg Oral BID WC     Continuous Infusions:   sodium chloride       PRN Meds:calcium carbonate, traMADol **OR** traMADol, ondansetron, cyclobenzaprine, sodium chloride flush, potassium chloride **OR** potassium chloride, magnesium sulfate, bisacodyl, sodium chloride, Menthol    Review of Systems  Pertinent items are noted in HPI.    Objective:     Patient Vitals for the past 8 hrs:   BP Temp Temp src Pulse Resp SpO2   12/16/24 2000 130/79 98.2 °F (36.8 °C) Oral 66 18 95 %     I/O last 3 completed shifts:  In: 1540 [P.O.:1540]  Out: 1875 [Urine:1875]  I/O this shift:  In: 200 [P.O.:200]  Out: 400 [Urine:400]    /79   Pulse 66   Temp 98.2 °F (36.8 °C) (Oral)   Resp 18   Ht 1.88 m (6' 2.02\")   Wt 99.5 kg (219 lb 5.7 oz)   SpO2 95%   BMI 28.15 kg/m²     General appearance: alert, appears stated age, and cooperative  Head: Normocephalic, without obvious abnormality,

## 2024-12-17 NOTE — PROGRESS NOTES
[facility]  INPATIENT PHYSICAL THERAPY  DAILY NOTE  [facility] - 8K-04/004-A      Discharge Recommendations: Continue to assess pending progress and Patient would benefit from continued PT at discharge  Equipment Recommendations: No (Continue to assess pending progress (Patient has RW and cane may require manual wheelchair))               Time In: 1330  Time Out: 1500  Timed Code Treatment Minutes: 90 Minutes  Minutes: 90          Date: 2024  Patient Name: Gonzales Sheehan,  Gender:  male        MRN: [id number]  : 1940  (84 y.o.)     Referring Practitioner: Monserrat Hanson APRN - CNP (ordering); Priti Arreola DO (attending)  Diagnosis: Fall at home, initial encounter  Additional Pertinent Hx: Gonzales Sheehan is a 84 y.o. male admitted to [facility] on 2024. 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  Patient presented to [facility] from [facility] after suffering a fall downstairs.  Patient reported no loss of consciousness.  Patient reported getting up to use the restroom around 1:30 in the morning, he lost his balance, and fell down the stairway.  Denied head strike.  Wife was able to assist him and reportedly was moving him around the house his sheets.  Son arrived the following day and patient was unable to ambulate, he was taken to the ER for evaluation and found to have multiple pelvic fractures with associated hemorrhage and spinal fractures.  Patient on Eliquis for A-fib.  Patient's main complaint  training, Patient/Caregiver education & training, Safety education & training, Home exercise program, Therapeutic activities, Endurance training, Stair training, Neuromuscular re-education, ROM  General Plan:  (5x/wk 90 min)    Education:  Learners: Patient  Patient Education: Plan of Care, Precautions/Restrictions, Education Related to Diagnosis, Bed Mobility, Equipment Education, Transfers, Gait, Stairs, Use of Gait Belt, Car Transfers, Health Promotion and Wellness Education, Home Safety Education, Activity Pacing, Postural Awareness,  - Patient Verbalized Understanding    Goals:  Patient Goals : \"To be able to get out of here\"  Short Term Goals  Time Frame for Short Term Goals: 1 week  Short Term Goal 1: Patient to perform supine<>sit with Minimal assistance in order to assist with getting into and out of bed.  Short Term Goal 2: Patient to perform sit<>stand transfers with use of RW and CGA in order to assist with safety with transfers in home.  Short Term Goal 3: Patient to ambulate >/=30 feet with use of RW and CGA in order to assist with home mobility.  Short Term Goal 4: Patient to perform bed<>chair transfers with use of RW and CGA in order to assist with transferring to/from various surfaces.  Short Term Goal 5: Patient to ascend/descend 1 step with B handrails and Minimal assistance in order to assist with progression to home entry.  Long Term Goals  Time Frame for Long Term Goals : 2 weeks from IPR evaluation  Long Term Goal 1: Patient to perform bed mobility supine<>sit with Mod I in order to assist with getting into and out of bed.  Long Term Goal 2: Patient to perform sit<>stand transfers with Mod I in order to assist with home mobility.  Long Term Goal 3: Patient to ambulate >/=100 feet with use of RW and Supervision in order to assist with home mobility.  Long Term Goal 4: Patient to perform car transfer with Supervision in order to assist with getting to and from appointments.  Long Term Goal 5:

## 2024-12-17 NOTE — PROGRESS NOTES
[facility]  Recreational Therapy  Daily Note  [facility]    Time Spent with Patient: 10 minutes    Date:  12/17/2024       Patient Name: Gonzales Sheehan      MRN: [id number]      YOB: 1940 (84 y.o.)       Gender: male  Diagnosis: Fall at home, initial encounter  Referring Practitioner: Monserrat Hanson APRN - CNP (ordering); Priti Arreola DO (attending)    RESTRICTIONS/PRECAUTIONS:  Restrictions/Precautions: Weight Bearing, General Precautions, Fall Risk     Hearing: Exceptions to WFL  Hearing Exceptions: Hard of hearing/hearing concerns    PAIN: 0    SUBJECTIVE:  I love all music    OBJECTIVE:  Pt enjoyed listening to our  play some of his favorite songs and he sang along too -pt appreciative-became tearful briefly and enjoyed himself-         Patient Education  New Education Provided: Importance of Leisure,     Electronically signed by: Breanna Ybarra CTRS  Date: 12/17/2024

## 2024-12-17 NOTE — PROGRESS NOTES
[facility]  Occupational Therapy  Daily Note    Discharge Recommendations: Home with Home Health OT and Home with Outpatient OT  Equipment Recommendations: Yes Continue to assess need for shower chair and LHAE, handouts provided to patient. Patient has ETS, grab in shower, family to install grab bar near toilet      Time In: 0900  Time Out: 1030  Timed Code Treatment Minutes: 90 Minutes  Minutes: 90          Date: 2024  Patient Name: Gonzales Sheehan,   Gender: male      [facility]  MRN: [id number]  : 1940  (84 y.o.)  Referring Practitioner: Monserrat Hanson APRN - CNP (ordering); Priti Arreola DO (attending)  Diagnosis: Fall at home, initial encounter  Additional Pertinent Hx: Gonzales Sheehan is a 84 y.o. male admitted to [facility] on 2024. 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  Patient presented to [facility] from [facility] after suffering a fall downstairs.  Patient reported no loss of consciousness.  Patient reported getting up to use the restroom around 1:30 in the morning, he lost his balance, and fell down the stairway.  Denied head strike.  Wife was able to assist him and reportedly was moving him around the house his sheets.  Son arrived the following day and patient was unable to ambulate, he was taken to the ER for evaluation and found to have multiple pelvic fractures with associated hemorrhage and spinal fractures.  Patient on Eliquis for A-fib.  Patient's main complaint

## 2024-12-17 NOTE — CARE COORDINATION
Patient having trouble voiding, last bladder scan completed at 1545, straight cath completed. Reports left hip pain, uses ice PRN.  Urine sent to lab for reflex with culture

## 2024-12-17 NOTE — PLAN OF CARE
Problem: Discharge Planning  Goal: Discharge to home or other facility with appropriate resources  12/17/2024 0208 by Xiomara Mejia RN  Outcome: Progressing     Problem: Safety - Adult  Goal: Free from fall injury  12/17/2024 0208 by Xiomara Mejia RN  Outcome: Progressing     Problem: ABCDS Injury Assessment  Goal: Absence of physical injury  12/17/2024 0208 by Xiomara Mejia RN  Outcome: Progressing     Problem: Pain  Goal: Verbalizes/displays adequate comfort level or baseline comfort level  12/17/2024 0208 by Xiomara Mejia RN  Outcome: Progressing  Flowsheets  Taken 12/16/2024 2000 by Xiomara Mejia RN  Verbalizes/displays adequate comfort level or baseline comfort level:   Encourage patient to monitor pain and request assistance   Assess pain using appropriate pain scale   Administer analgesics based on type and severity of pain and evaluate response   Implement non-pharmacological measures as appropriate and evaluate response  Taken 12/16/2024 1700 by Roberta Smith RN  Verbalizes/displays adequate comfort level or baseline comfort level: Encourage patient to monitor pain and request assistance     Problem: Skin/Tissue Integrity  Goal: Absence of new skin breakdown  Description: 1.  Monitor for areas of redness and/or skin breakdown  2.  Assess vascular access sites hourly  3.  Every 4-6 hours minimum:  Change oxygen saturation probe site  4.  Every 4-6 hours:  If on nasal continuous positive airway pressure, respiratory therapy assess nares and determine need for appliance change or resting period.  12/17/2024 0208 by Xiomara Mejia RN  Outcome: Progressing     Problem: Chronic Conditions and Co-morbidities  Goal: Patient's chronic conditions and co-morbidity symptoms are monitored and maintained or improved  12/17/2024 0208 by Xiomara Mejia RN  Outcome: Progressing

## 2024-12-17 NOTE — PLAN OF CARE
Problem: Discharge Planning  Goal: Discharge to home or other facility with appropriate resources  Outcome: Progressing  Flowsheets (Taken 12/17/2024 0739)  Discharge to home or other facility with appropriate resources:   Identify barriers to discharge with patient and caregiver   Arrange for needed discharge resources and transportation as appropriate     Problem: Safety - Adult  Goal: Free from fall injury  Outcome: Progressing  Flowsheets (Taken 12/16/2024 0653 by Anne Malin, RN)  Free From Fall Injury: Instruct family/caregiver on patient safety     Problem: ABCDS Injury Assessment  Goal: Absence of physical injury  Outcome: Progressing  Flowsheets (Taken 12/12/2024 1615 by Ivis Sin LPN)  Absence of Physical Injury: Implement safety measures based on patient assessment     Problem: Pain  Goal: Verbalizes/displays adequate comfort level or baseline comfort level  Outcome: Progressing  Flowsheets (Taken 12/17/2024 0736)  Verbalizes/displays adequate comfort level or baseline comfort level: Encourage patient to monitor pain and request assistance  Note: Pain Assessment: 0-10  Pain Level: 7   Patient's Stated Pain Goal: 5   Is pain goal met at this time?  Yes  Patient reports left hip pain, taking PRN pain as needed, reports decrease pain.   Non-Pharmaceutical Pain Intervention(s): Repositioned       Problem: Skin/Tissue Integrity  Goal: Absence of new skin breakdown  Description: 1.  Monitor for areas of redness and/or skin breakdown  2.  Assess vascular access sites hourly  3.  Every 4-6 hours minimum:  Change oxygen saturation probe site  4.  Every 4-6 hours:  If on nasal continuous positive airway pressure, respiratory therapy assess nares and determine need for appliance change or resting period.  Outcome: Progressing  Note: No signs of infection noted so far this shift.       Problem: Chronic Conditions and Co-morbidities  Goal: Patient's chronic conditions and co-morbidity symptoms are

## 2024-12-17 NOTE — PROGRESS NOTES
Physical Medicine & Rehabilitation   Progress Note    Chief Complaint:  Pelvic/sacral fractures     Subjective:  Patient seen and examined this morning. Has continued to require intermittent straight cath. Discussed trial of increased flomax which patient is agreeable to. We discussed that if this doesn't help, we will likely need to consult urology tomorrow. No reports of any CP or SOB. He reports that he is having regular BM.     Rehabilitation:  PT 12/17/2024:  Bed Mobility:  Rolling to Right: Supervision, X 1   Supine to Sit: Supervision, Stand By Assistance, X 1  Sit to Supine: Minimal Assistance, X 1 for LE's   Scooting: Dependent, X 1, with head of bed flat using [place] function on bed   *HOB elevated and bed railing utilized during supine to sit transfers; bed flat with no railing during sit to supine transfers       Transfers:  Sit to Stand: Stand By Assistance, Contact Guard Assistance, X 1  Stand to Sit:Stand By Assistance, Contact Guard Assistance, X 1  *transfers performed from EOB, car, and wheelchair during session      Car:Contact Guard Assistance, Minimal Assistance, X 1  *Min Ax1 for LE placement in/out of car; increased pain verbalized during car transfers with pt rating pain at 10/10     Timed Up & Go (TUG) Score & Normative Values:      Trial 1: 37.04 seconds  Trial 2: 29.08 seconds  Trial 3: 27.11 seconds  Average: 31.07 seconds      An older adult who takes >=12 seconds to complete the TUG is at risk for falling.    Ambulation:  Stand By Assistance, Contact Guard Assistance, X 1  Distance: 60 feet; 8 feet x2; 75 feet x2; 10 feet x2; 12 feet; 16 feet  Surface: Level Tile  Device: Rolling Walker  Gait Deviations: Slow Mari, Decreased Step Length Bilaterally, Decreased Weight Shift Left, Decreased Gait Speed, Narrow Base of Support, Decreased Terminal Knee Extension, Increased reliance on assistive device, and Cues for proximity to assistive device      Stairs:  Stairs: 4\" steps X 6 steps  care and hair care, seated rest break required after task.   Bathing: Minimal Assistance.  For bottom while standing. Able to wash LEs with use of LH sponge brush with 1 cue to initiate. Able to wash remaining areas seated on tub bench with SBA. Increased time to complete tasks   Upper Extremity Dressing: with set-up.  To doff/don tshirt in seated position  Lower Extremity Dressing: Contact Guard Assistance.  For clothing management downward/upward. Utilized reacher to thread LEs into depends/pants in seated position with SBA and increased time  Footwear Management: Maximum Assistance.  To don B shoes/FRANCOISE hose. Able to doff B socks using reacher with cues for technique  Toileting: Contact Guard Assistance.  For clothing management downward/upward x2 trials and for hygiene with increased time and 1 UE release   Toilet Transfer: Contact Guard Assistance. To/from RTS with increased time x4 trials  Shower Transfer: Contact Guard Assistance.  To/from tub bench utilized in walk in shower, utilized grab bar on L side.  Discussion provided with patient on equipment needs for eventual discharge. Patient reports having grab bar in shower, daughter purchased ETS and is planning to install grab bar near toilet. Discussed purchasing shower chair and LHAE, handouts provided.      Modified Barthel Index administered this date with pt scoring 65/100 indicating moderate dependency with daily task completion. (Was 47/100 on evaluation 12/11)     BALANCE:  Sitting Balance:  Supervision.    Standing Balance: Contact Guard Assistance. - close SBA     BED MOBILITY:  Rolling to Right: Supervision    Supine to Sit: Minimal Assistance    Scooting: Supervision to scoot hips towards EOB     TRANSFERS:  Sit to Stand:  Contact Guard Assistance. From various surfaces with increased time  Stand to Sit: Contact Guard Assistance. To various surfaces with increased time     FUNCTIONAL MOBILITY:  Assistive Device: Rolling Walker  Assist Level:

## 2024-12-18 LAB
BACTERIA: ABNORMAL
BILIRUB UR QL STRIP: NEGATIVE
CASTS #/AREA URNS LPF: ABNORMAL /LPF
CASTS #/AREA URNS LPF: ABNORMAL /LPF
CHARACTER UR: CLEAR
CHARCOAL URNS QL MICRO: ABNORMAL
COLOR UR: YELLOW
CRYSTALS URNS QL MICRO: ABNORMAL
EPITHELIAL CELLS, UA: ABNORMAL /HPF
GLUCOSE UR QL STRIP.AUTO: NEGATIVE MG/DL
HGB UR QL STRIP.AUTO: ABNORMAL
KETONES UR QL STRIP.AUTO: NEGATIVE
LEUKOCYTE ESTERASE UR QL STRIP.AUTO: ABNORMAL
NITRITE UR QL STRIP.AUTO: NEGATIVE
PH UR STRIP.AUTO: 6 [PH] (ref 5–9)
PROT UR STRIP.AUTO-MCNC: NEGATIVE MG/DL
RBC #/AREA URNS HPF: ABNORMAL /HPF
RENAL EPI CELLS #/AREA URNS HPF: ABNORMAL /[HPF]
SP GR UR REFRACT.AUTO: 1.02 (ref 1–1.03)
UROBILINOGEN UR QL STRIP.AUTO: 0.2 EU/DL (ref 0–1)
WBC #/AREA URNS HPF: ABNORMAL /HPF
YEAST LIKE FUNGI URNS QL MICRO: ABNORMAL

## 2024-12-18 PROCEDURE — 97110 THERAPEUTIC EXERCISES: CPT

## 2024-12-18 PROCEDURE — 97530 THERAPEUTIC ACTIVITIES: CPT

## 2024-12-18 PROCEDURE — 6370000000 HC RX 637 (ALT 250 FOR IP): Performed by: FAMILY MEDICINE

## 2024-12-18 PROCEDURE — 51701 INSERT BLADDER CATHETER: CPT

## 2024-12-18 PROCEDURE — 1180000000 HC REHAB R&B

## 2024-12-18 PROCEDURE — 81001 URINALYSIS AUTO W/SCOPE: CPT

## 2024-12-18 PROCEDURE — 97535 SELF CARE MNGMENT TRAINING: CPT

## 2024-12-18 PROCEDURE — 87086 URINE CULTURE/COLONY COUNT: CPT

## 2024-12-18 PROCEDURE — 99232 SBSQ HOSP IP/OBS MODERATE 35: CPT | Performed by: STUDENT IN AN ORGANIZED HEALTH CARE EDUCATION/TRAINING PROGRAM

## 2024-12-18 PROCEDURE — 97116 GAIT TRAINING THERAPY: CPT

## 2024-12-18 PROCEDURE — 51798 US URINE CAPACITY MEASURE: CPT

## 2024-12-18 PROCEDURE — 6370000000 HC RX 637 (ALT 250 FOR IP): Performed by: NURSE PRACTITIONER

## 2024-12-18 PROCEDURE — 6370000000 HC RX 637 (ALT 250 FOR IP): Performed by: STUDENT IN AN ORGANIZED HEALTH CARE EDUCATION/TRAINING PROGRAM

## 2024-12-18 RX ORDER — TAMSULOSIN HYDROCHLORIDE 0.4 MG/1
0.8 CAPSULE ORAL DAILY
Status: DISCONTINUED | OUTPATIENT
Start: 2024-12-18 | End: 2024-12-24 | Stop reason: HOSPADM

## 2024-12-18 RX ADMIN — APIXABAN 5 MG: 5 TABLET, FILM COATED ORAL at 08:37

## 2024-12-18 RX ADMIN — DOCUSATE SODIUM 100 MG: 100 CAPSULE, LIQUID FILLED ORAL at 08:37

## 2024-12-18 RX ADMIN — Medication 400 MG: at 08:36

## 2024-12-18 RX ADMIN — Medication 5000 UNITS: at 08:37

## 2024-12-18 RX ADMIN — FERROUS SULFATE TAB 325 MG (65 MG ELEMENTAL FE) 325 MG: 325 (65 FE) TAB at 08:37

## 2024-12-18 RX ADMIN — CALCIUM 500 MG: 500 TABLET ORAL at 15:13

## 2024-12-18 RX ADMIN — CALCIUM 500 MG: 500 TABLET ORAL at 08:37

## 2024-12-18 RX ADMIN — TRAMADOL HYDROCHLORIDE 100 MG: 50 TABLET, COATED ORAL at 15:12

## 2024-12-18 RX ADMIN — TRAMADOL HYDROCHLORIDE 100 MG: 50 TABLET, COATED ORAL at 01:38

## 2024-12-18 RX ADMIN — APIXABAN 5 MG: 5 TABLET, FILM COATED ORAL at 20:30

## 2024-12-18 RX ADMIN — ACETAMINOPHEN 1000 MG: 500 TABLET ORAL at 20:29

## 2024-12-18 RX ADMIN — OXYCODONE HYDROCHLORIDE AND ACETAMINOPHEN 500 MG: 500 TABLET ORAL at 08:37

## 2024-12-18 RX ADMIN — SENNOSIDES 17.2 MG: 8.6 TABLET ORAL at 20:30

## 2024-12-18 RX ADMIN — TRAMADOL HYDROCHLORIDE 100 MG: 50 TABLET, COATED ORAL at 08:38

## 2024-12-18 RX ADMIN — FAMOTIDINE 20 MG: 20 TABLET, FILM COATED ORAL at 08:37

## 2024-12-18 RX ADMIN — ATORVASTATIN CALCIUM 40 MG: 40 TABLET, FILM COATED ORAL at 20:30

## 2024-12-18 RX ADMIN — FERROUS SULFATE TAB 325 MG (65 MG ELEMENTAL FE) 325 MG: 325 (65 FE) TAB at 15:12

## 2024-12-18 RX ADMIN — Medication 400 MG: at 20:30

## 2024-12-18 RX ADMIN — TRAMADOL HYDROCHLORIDE 100 MG: 50 TABLET, COATED ORAL at 21:44

## 2024-12-18 RX ADMIN — ACETAMINOPHEN 1000 MG: 500 TABLET ORAL at 05:14

## 2024-12-18 RX ADMIN — OXYCODONE HYDROCHLORIDE AND ACETAMINOPHEN 500 MG: 500 TABLET ORAL at 15:13

## 2024-12-18 RX ADMIN — ACETAMINOPHEN 1000 MG: 500 TABLET ORAL at 13:09

## 2024-12-18 RX ADMIN — TAMSULOSIN HYDROCHLORIDE 0.8 MG: 0.4 CAPSULE ORAL at 20:30

## 2024-12-18 RX ADMIN — FINASTERIDE 5 MG: 5 TABLET, FILM COATED ORAL at 20:30

## 2024-12-18 RX ADMIN — FAMOTIDINE 20 MG: 20 TABLET, FILM COATED ORAL at 20:30

## 2024-12-18 ASSESSMENT — PAIN SCALES - WONG BAKER
WONGBAKER_NUMERICALRESPONSE: HURTS A LITTLE BIT
WONGBAKER_NUMERICALRESPONSE: HURTS A LITTLE BIT

## 2024-12-18 ASSESSMENT — PAIN SCALES - GENERAL
PAINLEVEL_OUTOF10: 3
PAINLEVEL_OUTOF10: 4
PAINLEVEL_OUTOF10: 2
PAINLEVEL_OUTOF10: 8
PAINLEVEL_OUTOF10: 2
PAINLEVEL_OUTOF10: 8

## 2024-12-18 ASSESSMENT — PAIN DESCRIPTION - DESCRIPTORS
DESCRIPTORS: ACHING
DESCRIPTORS: SHOOTING;SHARP
DESCRIPTORS: SHARP;SHOOTING
DESCRIPTORS: SHOOTING;SHARP

## 2024-12-18 ASSESSMENT — PAIN DESCRIPTION - ORIENTATION
ORIENTATION: LEFT
ORIENTATION: LEFT
ORIENTATION: LOWER
ORIENTATION: LEFT

## 2024-12-18 ASSESSMENT — PAIN - FUNCTIONAL ASSESSMENT: PAIN_FUNCTIONAL_ASSESSMENT: PREVENTS OR INTERFERES SOME ACTIVE ACTIVITIES AND ADLS

## 2024-12-18 ASSESSMENT — PAIN DESCRIPTION - LOCATION
LOCATION: BACK
LOCATION: HIP

## 2024-12-18 NOTE — PROGRESS NOTES
Patient: Gonzales Sheehan  Unit/Bed: 8K-04/004-A  YOB: 1940  MRN: [id number] Acct: [id number]   Admitting Diagnosis: Fall at home, initial encounter [W19.XXXA, Y92.009]  Admit Date:  12/10/2024  Hospital Day: 8    Assessment:     Principal Problem:    Fall at home, initial encounter  Resolved Problems:    * No resolved hospital problems. *      Plan:     Continue to follow  repeat the UA for follow up of the RBC  He had a CT abd/pelvis with question of a bladder abnormality that can be followed up upon as an outpatient.          Subjective:     Patient has no complaint of CP or SOB..   Medication side effects: none    Scheduled Meds:   tamsulosin  0.8 mg Oral Daily    magnesium oxide  400 mg Oral BID    acetaminophen  1,000 mg Oral 3 times per day    Vitamin D  5,000 Units Oral Daily    finasteride  5 mg Oral Daily    docusate sodium  100 mg Oral BID    atorvastatin  40 mg Oral Nightly    apixaban  5 mg Oral BID    sodium chloride flush  5-40 mL IntraVENous 2 times per day    senna  2 tablet Oral Nightly    polyethylene glycol  17 g Oral Daily    famotidine  20 mg Oral BID    ferrous sulfate  325 mg Oral BID WC    ascorbic acid  500 mg Oral BID WC    calcium elemental  500 mg Oral BID WC     Continuous Infusions:   sodium chloride       PRN Meds:calcium carbonate, traMADol **OR** traMADol, ondansetron, cyclobenzaprine, sodium chloride flush, potassium chloride **OR** potassium chloride, magnesium sulfate, bisacodyl, sodium chloride, Menthol    Review of Systems  Pertinent items are noted in HPI.    Objective:     Patient Vitals for the past 8 hrs:   BP Temp Temp src Pulse Resp SpO2 Weight   12/18/24 0908 -- -- -- -- 17 -- --   12/18/24 0815 (!) 143/72 98.2 °F (36.8 °C) Oral 67 17 95 % --   12/18/24 0608 -- -- -- -- -- -- 93 kg (205 lb 0.4 oz)     I/O last 3 completed shifts:  In: 1800 [P.O.:1800]  Out: 3550 [Urine:3550]  I/O this shift:  In: -   Out: 550 [Urine:550]    BP (!) 143/72   Pulse 67

## 2024-12-18 NOTE — PROGRESS NOTES
[facility]  INPATIENT REHABILITATION  TEAM CONFERENCE NOTE    Conference Date: 2024  Admit Date:  12/10/2024  2:58 PM  Patient Name: Gonzales Sheehan    MRN: [id number]    : 1940  (84 y.o.)  Rehabilitation Admitting Diagnosis:  Fall at home, initial encounter [W19.XXXA, Y92.009]  Referring Practitioner: Monserrat Hanson APRN - CNP (ordering); Priti Arreola DO (attending)      CASE MANAGEMENT  Current issues/needs regarding patient and family discharge status: Patient continues to be motivated and progressing with therapy. Patient plans to be discharged on Tuesday,  with home health vs outpatient therapy pending patient's progress. SW will follow and maintain involvement in discharge planning.     PHYSICAL THERAPY  Mr Sheehan met 4/5 STG's and 1/6 LTG's.  Overall, patient has demonstrated good progress in PT over the last week progressing supine to sit from max assist to min assist, sit < > stand from moderate/max assist to CGA, gait from ambulating 3' with moderate assistance to up to 104' with a RW with SBA/CGA.  Patient scored 13/28 on the tinetti and completed the TUG in 33.1 seconds indicating increased risk for falls.  Patient is limited by pain in his left hip/glute region.  Additionally, pt has been limited by orthostatic hypotension.  Mr Sheehan will benefit from continued skilled PT services to continue to improve his ability to complete functional mobility, reduce his risk for falls and allow patient to return to home safely.  Equipment Needed: No (Continue to assess pending progress (Patient has RW and cane may require manual wheelchair))      OCCUPATIONAL THERAPY  Gonzales is making good steady progress towards therapy goals. Throughout sessions, patient can be limited by pain in L hip which radiates down lower extremity and decreased strength and activity tolerance. He currently requires an increased level of A for all ADLs and IADLs. Gonzales is able to complete sit to  details.    NURSING  Continent of Bowel: Yes.  Frequency: Daily.  Management: Dulcolax, Colace, Miralax Senokot.  Continent of Bladder: Yes.  Frequency: Bladder scans q 4.  Management: Straight cath if bladder scan 300 or greater.  Pain is Managed:  Yes.  Management: Scheduled Tylenol, Ultram Prn q 6.  Frequency of Intervention: see above.  Adequately Controlled: Yes  Sleep: Adequate  Signs and Symptoms of Infection:  No.   Signs and Symptoms of Skin Breakdown:  No.   Injury and/or Falls during Inpatient Rehabilitation Admission: No  Anticoagulants: Eliquis  Diabetic: No  Oxygen while on IP Rehab:  No   Home oxygen: No  Patient/Family Education Focus: Modifications to home to reduce hazards         No results for input(s): \"POCGLU\" in the last 72 hours.    No results found for: \"LDLDIRECT\"      Vitals:    12/19/24 0409 12/19/24 0600 12/19/24 0845 12/19/24 1016   BP:   123/80    Pulse:   78    Resp: 18 19 18   Temp:   97.6 °F (36.4 °C)    TempSrc:   Oral    SpO2:   94%    Weight:  96.5 kg (212 lb 11.9 oz)     Height:              Family Education: Family available and participating in education   Fall Risk:  Falling star program initiated  Is the patient appropriate for a stay in the functional apartment? yes, prior to DC if agreeable    Discharge Plan   Estimated Discharge Date:  12/24/2024    Destination: discharge home with supervision  Services at Discharge: Home Health  Physical Therapy, Occupational Therapy, Speech Therapy, Nursing, and home health aide 2-3x week  Is patient appropriate for an outpatient driving evaluation? Will need physician clearance prior to return to driving   Equipment at Discharge: Other: Continue to assess need for shower chair and LHAE, handouts provided to patient. Patient has ETS, grab in shower, family to install grab bar near toilet  Factors facilitating achievement of predicted outcomes: Family support, Cooperative, and Pleasant  Barriers to the achievement of predicted outcomes:

## 2024-12-18 NOTE — PROGRESS NOTES
Comprehensive Nutrition Assessment    Type and Reason for Visit:  Reassess    Nutrition Recommendations/Plan:   Recommend diet as tolerated.  Continue Ensure Plus BID.  Recommend MVI.  Encouraged po, ONS at best efforts.  Discussed appropriate use of ONS at home.     Malnutrition Assessment:  Malnutrition Status:  At risk for malnutrition (12/12/24 1345)    Context:  Acute Illness     Findings of the 6 clinical characteristics of malnutrition:  Energy Intake:  Mild decrease in energy intake  Weight Loss:  No weight loss     Body Fat Loss:  No body fat loss     Muscle Mass Loss:  No muscle mass loss    Fluid Accumulation:  Unable to assess     Strength:  Not Performed    Nutrition Assessment:     Pt. nutritionally compromised AEB wounds.  At risk for further nutrition compromise r/t increased nutrient needs for wound healing, admit s/p fall, fractures; advanced age and underlying medical condition (hx HLD).       Nutrition Related Findings:    Pt. Report/Treatments/Miscellaneous:   12/18-pt. Seen - reports appetite is getting better; states appetite had decreased appetite d/t pain but pain is now a little better; states acceptance of Ensures; states likes milk and is consuming; denies any trouble tolerating diet  12/12-pt. Seen - reports good appetite but states not eating well d/t dislikes hospital food; denies any trouble tolerating diet; reports good appetite and intake pta (consumes 2-3 meals/day); agrees to trial Ensure Plus BID; wife seems receptive to bringing in food from outside   GI Status: BM 12/18  Pertinent Labs: 12/17: Glucose 91, BUN 23, Cr 0.9  Pertinent Meds: Vitamin C, Vitamin D, Iron, Colace, Glycolax, Senokot       Wound Type:  (scab-knee, PI-buttocks, traumatic-scrotum, scab-ulnar)       Current Nutrition Intake & Therapies:    Average Meal Intake: 51-75%, %  Average Supplements Intake:  (states acceptance)  ADULT DIET; Regular  ADULT ORAL NUTRITION SUPPLEMENT; Breakfast, Dinner;

## 2024-12-18 NOTE — PLAN OF CARE
Problem: Safety - Adult  Goal: Free from fall injury  12/18/2024 0115 by Xiomara Mejia RN  Outcome: Progressing     Problem: ABCDS Injury Assessment  Goal: Absence of physical injury  12/18/2024 0115 by Xiomara Mejia RN  Outcome: Progressing     Problem: Pain  Goal: Verbalizes/displays adequate comfort level or baseline comfort level  12/18/2024 0115 by Xiomara Mejia RN  Outcome: Progressing  Flowsheets (Taken 12/17/2024 2100)  Verbalizes/displays adequate comfort level or baseline comfort level:   Encourage patient to monitor pain and request assistance   Assess pain using appropriate pain scale   Administer analgesics based on type and severity of pain and evaluate response   Implement non-pharmacological measures as appropriate and evaluate response     Problem: Skin/Tissue Integrity  Goal: Absence of new skin breakdown  Description: 1.  Monitor for areas of redness and/or skin breakdown  2.  Assess vascular access sites hourly  3.  Every 4-6 hours minimum:  Change oxygen saturation probe site  4.  Every 4-6 hours:  If on nasal continuous positive airway pressure, respiratory therapy assess nares and determine need for appliance change or resting period.  12/18/2024 0115 by Xiomara Mejia RN  Outcome: Progressing     Problem: Chronic Conditions and Co-morbidities  Goal: Patient's chronic conditions and co-morbidity symptoms are monitored and maintained or improved  12/18/2024 0115 by Xiomara Mejia RN  Outcome: Progressing  Flowsheets (Taken 12/17/2024 2059)  Care Plan - Patient's Chronic Conditions and Co-Morbidity Symptoms are Monitored and Maintained or Improved: Monitor and assess patient's chronic conditions and comorbid symptoms for stability, deterioration, or improvement

## 2024-12-18 NOTE — PROGRESS NOTES
[facility]  Occupational Therapy  Progress Note    Discharge Recommendations: Home with Outpatient OT   Equipment Recommendations: Yes Continue to assess need for shower chair and LHAE, handouts provided to patient. Patient has ETS, grab in shower, family to install grab bar near toilet      Time In: 0700  Time Out: 0800  Timed Code Treatment Minutes: 60 Minutes  Minutes: 60          Date: 2024  Patient Name: Gonzales Sheehan,   Gender: male      Room: [facility]004-  MRN: [id number]  : 1940  (84 y.o.)  Referring Practitioner: Monserrat Hanson APRN - CNP (ordering); Priti Arreola DO (attending)  Diagnosis: Fall at home, initial encounter  Additional Pertinent Hx: Gonzales Sheehan is a 84 y.o. male admitted to [facility] on 2024. Patient  has a past medical history of AMD (age-related macular degeneration), wet (HCC), Bradycardia, Enlarged prostate, H/O cardiovascular stress test, H/O echocardiogram, History of Holter monitoring, Hyperlipidemia, Other and unspecified hyperlipidemia, PAF (paroxysmal atrial fibrillation) ([facility]), Psychosexual dysfunction with inhibited sexual excitement, and Unspecified hyperplasia of prostate without urinary obstruction and other lower urinary tract symptoms (LUTS).  Patient presented to [facility] from [facility] after suffering a fall downstairs.  Patient reported no loss of consciousness.  Patient reported getting up to use the restroom around 1:30 in the morning, he lost his balance, and fell down the stairway.  Denied head strike.  Wife was able to assist him and reportedly was moving him around the house his sheets.  Son arrived the following day and patient was unable to ambulate, he was taken to the ER for evaluation and found to have multiple pelvic fractures with associated hemorrhage and spinal fractures.  Patient on Eliquis for A-fib.  Patient's main complaint with left-sided pelvic

## 2024-12-18 NOTE — CARE COORDINATION
Went over meds to help with prostate.  Went over rationale of bladder scanning and straight cath.  States he is happy with his pain control medication.  with goal of 5.  Goal is to work with therapy and to go home.

## 2024-12-18 NOTE — PROGRESS NOTES
[facility]  INPATIENT PHYSICAL THERAPY  PROGRESS NOTE  [facility] - 8K-04/004-A      Discharge Recommendations: Continue to assess pending progress and Patient would benefit from continued PT at discharge  Equipment Recommendations:Equipment Needed: No (Continue to assess pending progress (Patient has RW and cane may require manual wheelchair))      Time In: 1330  Time Out: 1500  Timed Code Treatment Minutes: 90 Minutes  Minutes: 90          Date: 2024  Patient Name: Gonzales Sheehan,  Gender:  male        MRN: [id number]  : 1940  (84 y.o.)     Referring Practitioner: Monserrat Hanson APRN - CNP (ordering); Priti Arreola DO (attending)  Diagnosis: Fall at home, initial encounter  Additional Pertinent Hx: Gonzales Sheehan is a 84 y.o. male admitted to [facility] on 2024. Patient  has a past medical history of AMD (age-related macular degeneration), wet (HCC), Bradycardia, Enlarged prostate, H/O cardiovascular stress test, H/O echocardiogram, History of Holter monitoring, Hyperlipidemia, Other and unspecified hyperlipidemia, PAF (paroxysmal atrial fibrillation) ([facility]), Psychosexual dysfunction with inhibited sexual excitement, and Unspecified hyperplasia of prostate without urinary obstruction and other lower urinary tract symptoms (LUTS).  Patient presented to [facility] from [facility] after suffering a fall downstairs.  Patient reported no loss of consciousness.  Patient reported getting up to use the restroom around 1:30 in the morning, he lost his balance, and fell down the stairway.  Denied head strike.  Wife was able to assist him and reportedly was moving him around the house his sheets.  Son arrived the following day and patient was unable to ambulate, he was taken to the ER for evaluation and found to have multiple pelvic fractures with associated hemorrhage and spinal fractures.  Patient on Eliquis for A-fib.  Patient's main  in order to assist with home mobility. GOAL MET, SEE LTG  Short Term Goal 4: Patient to perform bed<>chair transfers with use of RW and CGA in order to assist with transferring to/from various surfaces.  Short Term Goal 5: Patient to ascend/descend 3 steps with B handrails and SBA in order to assist with progression to home entry.     Revised Long-Term Goals  Long Term Goals  Time Frame for Long Term Goals : 2 weeks from IPR evaluation  Long Term Goal 1: Patient to perform bed mobility supine<>sit with Mod I in order to assist with getting into and out of bed.  Long Term Goal 2: Patient to perform sit<>stand transfers with Mod I in order to assist with home mobility.  Long Term Goal 3: Patient to ambulate >/=100 feet with use of RW and Supervision in order to assist with home mobility.  Long Term Goal 4: Patient to perform car transfer with Supervision in order to assist with getting to and from appointments.  Long Term Goal 5: Patient to ascend/descend 3 steps with B handrails and Supervision in order to assist with home entry.  Additional Goals?: Yes  Long term goal 6: PT to assess Tinetti/TUG when able     Following session, patient left in safe position with all fall risk precautions in place.

## 2024-12-18 NOTE — PLAN OF CARE
Problem: Discharge Planning  Goal: Discharge to home or other facility with appropriate resources  Outcome: Progressing  Flowsheets (Taken 12/18/2024 0815)  Discharge to home or other facility with appropriate resources: Identify barriers to discharge with patient and caregiver     Problem: Safety - Adult  Goal: Free from fall injury  Outcome: Progressing  Flowsheets (Taken 12/18/2024 1541)  Free From Fall Injury: Instruct family/caregiver on patient safety     Problem: ABCDS Injury Assessment  Goal: Absence of physical injury  Outcome: Progressing  Flowsheets (Taken 12/18/2024 1541)  Absence of Physical Injury: Implement safety measures based on patient assessment     Problem: Pain  Goal: Verbalizes/displays adequate comfort level or baseline comfort level  Outcome: Progressing  Flowsheets (Taken 12/18/2024 0815)  Verbalizes/displays adequate comfort level or baseline comfort level:   Encourage patient to monitor pain and request assistance   Assess pain using appropriate pain scale     Problem: Skin/Tissue Integrity  Goal: Absence of new skin breakdown  Description: 1.  Monitor for areas of redness and/or skin breakdown  2.  Assess vascular access sites hourly  3.  Every 4-6 hours minimum:  Change oxygen saturation probe site  4.  Every 4-6 hours:  If on nasal continuous positive airway pressure, respiratory therapy assess nares and determine need for appliance change or resting period.  Outcome: Progressing     Problem: Chronic Conditions and Co-morbidities  Goal: Patient's chronic conditions and co-morbidity symptoms are monitored and maintained or improved  Outcome: Progressing  Flowsheets (Taken 12/18/2024 0815)  Care Plan - Patient's Chronic Conditions and Co-Morbidity Symptoms are Monitored and Maintained or Improved: Monitor and assess patient's chronic conditions and comorbid symptoms for stability, deterioration, or improvement     Problem: Nutrition Deficit:  Goal: Optimize nutritional

## 2024-12-18 NOTE — PROGRESS NOTES
[facility]  Occupational Therapy  Daily Note    Discharge Recommendations: Continue to assess pending progress and Patient would benefit from continued OT at discharge  Equipment Recommendations: Yes Continue to assess need for shower chair and LHAE, handouts provided to patient. Patient has ETS, grab in shower, family to install grab bar near toilet       Time In: 0959  Time Out: 1029  Timed Code Treatment Minutes: 30 Minutes  Minutes: 30          Date: 2024  Patient Name: Gonzales Sheehan,   Gender: male      [facility]  MRN: [id number]  : 1940  (84 y.o.)  Referring Practitioner: Monserrat Hanson APRN - CNP (ordering); Priti Arreola DO (attending)  Diagnosis: Fall at home, initial encounter  Additional Pertinent Hx: Gonzales Sheehan is a 84 y.o. male admitted to [facility] on 2024. Patient  has a past medical history of AMD (age-related macular degeneration), wet (HCC), Bradycardia, Enlarged prostate, H/O cardiovascular stress test, H/O echocardiogram, History of Holter monitoring, Hyperlipidemia, Other and unspecified hyperlipidemia, PAF (paroxysmal atrial fibrillation) ([facility]), Psychosexual dysfunction with inhibited sexual excitement, and Unspecified hyperplasia of prostate without urinary obstruction and other lower urinary tract symptoms (LUTS).  Patient presented to [facility] from [facility] after suffering a fall downstairs.  Patient reported no loss of consciousness.  Patient reported getting up to use the restroom around 1:30 in the morning, he lost his balance, and fell down the stairway.  Denied head strike.  Wife was able to assist him and reportedly was moving him around the house his sheets.  Son arrived the following day and patient was unable to ambulate, he was taken to the ER for evaluation and found to have multiple pelvic fractures with associated hemorrhage and spinal fractures.  Patient on

## 2024-12-18 NOTE — CARE COORDINATION
Patient in good spirits today. Much family in to visit. Bladder scan Q 4-6 hours and Straight cat. if needed. Bladder scan this morning 0925 598ML with straight cath 550ML out. Bladder scan at 1709 676 with 650 Straight cath output. Urinalysis taken and sent. Tramadol Q 6 and patient has been asking for it.     Patient educated on how to use incentive spirometer. Patient verbalized understanding and demonstrated proper use. Emphasized importance and usage of device, with coughing and deep breathing every 4 hours while awake.

## 2024-12-19 LAB
BACTERIA UR CULT: ABNORMAL
ORGANISM: ABNORMAL

## 2024-12-19 PROCEDURE — 6370000000 HC RX 637 (ALT 250 FOR IP): Performed by: FAMILY MEDICINE

## 2024-12-19 PROCEDURE — 97110 THERAPEUTIC EXERCISES: CPT

## 2024-12-19 PROCEDURE — 51798 US URINE CAPACITY MEASURE: CPT

## 2024-12-19 PROCEDURE — 97535 SELF CARE MNGMENT TRAINING: CPT

## 2024-12-19 PROCEDURE — 51701 INSERT BLADDER CATHETER: CPT

## 2024-12-19 PROCEDURE — 97116 GAIT TRAINING THERAPY: CPT

## 2024-12-19 PROCEDURE — 1180000000 HC REHAB R&B

## 2024-12-19 PROCEDURE — 6370000000 HC RX 637 (ALT 250 FOR IP): Performed by: NURSE PRACTITIONER

## 2024-12-19 PROCEDURE — 97112 NEUROMUSCULAR REEDUCATION: CPT

## 2024-12-19 PROCEDURE — 6370000000 HC RX 637 (ALT 250 FOR IP): Performed by: STUDENT IN AN ORGANIZED HEALTH CARE EDUCATION/TRAINING PROGRAM

## 2024-12-19 PROCEDURE — 97530 THERAPEUTIC ACTIVITIES: CPT

## 2024-12-19 PROCEDURE — 99232 SBSQ HOSP IP/OBS MODERATE 35: CPT | Performed by: STUDENT IN AN ORGANIZED HEALTH CARE EDUCATION/TRAINING PROGRAM

## 2024-12-19 RX ORDER — DOCUSATE SODIUM 100 MG/1
200 CAPSULE, LIQUID FILLED ORAL 2 TIMES DAILY
Status: DISCONTINUED | OUTPATIENT
Start: 2024-12-19 | End: 2024-12-24 | Stop reason: HOSPADM

## 2024-12-19 RX ORDER — POLYETHYLENE GLYCOL 3350 17 G/17G
17 POWDER, FOR SOLUTION ORAL DAILY PRN
Status: DISCONTINUED | OUTPATIENT
Start: 2024-12-19 | End: 2024-12-24 | Stop reason: HOSPADM

## 2024-12-19 RX ADMIN — Medication 5000 UNITS: at 08:48

## 2024-12-19 RX ADMIN — ACETAMINOPHEN 1000 MG: 500 TABLET ORAL at 20:35

## 2024-12-19 RX ADMIN — APIXABAN 5 MG: 5 TABLET, FILM COATED ORAL at 08:48

## 2024-12-19 RX ADMIN — OXYCODONE HYDROCHLORIDE AND ACETAMINOPHEN 500 MG: 500 TABLET ORAL at 08:48

## 2024-12-19 RX ADMIN — FERROUS SULFATE TAB 325 MG (65 MG ELEMENTAL FE) 325 MG: 325 (65 FE) TAB at 17:43

## 2024-12-19 RX ADMIN — APIXABAN 5 MG: 5 TABLET, FILM COATED ORAL at 20:34

## 2024-12-19 RX ADMIN — TAMSULOSIN HYDROCHLORIDE 0.8 MG: 0.4 CAPSULE ORAL at 20:33

## 2024-12-19 RX ADMIN — FERROUS SULFATE TAB 325 MG (65 MG ELEMENTAL FE) 325 MG: 325 (65 FE) TAB at 08:48

## 2024-12-19 RX ADMIN — Medication 400 MG: at 20:33

## 2024-12-19 RX ADMIN — ATORVASTATIN CALCIUM 40 MG: 40 TABLET, FILM COATED ORAL at 20:33

## 2024-12-19 RX ADMIN — FAMOTIDINE 20 MG: 20 TABLET, FILM COATED ORAL at 08:49

## 2024-12-19 RX ADMIN — TRAMADOL HYDROCHLORIDE 100 MG: 50 TABLET, COATED ORAL at 22:27

## 2024-12-19 RX ADMIN — Medication 400 MG: at 08:48

## 2024-12-19 RX ADMIN — TRAMADOL HYDROCHLORIDE 100 MG: 50 TABLET, COATED ORAL at 10:16

## 2024-12-19 RX ADMIN — CALCIUM 500 MG: 500 TABLET ORAL at 08:48

## 2024-12-19 RX ADMIN — FAMOTIDINE 20 MG: 20 TABLET, FILM COATED ORAL at 20:33

## 2024-12-19 RX ADMIN — OXYCODONE HYDROCHLORIDE AND ACETAMINOPHEN 500 MG: 500 TABLET ORAL at 17:43

## 2024-12-19 RX ADMIN — DOCUSATE SODIUM 200 MG: 100 CAPSULE, LIQUID FILLED ORAL at 20:33

## 2024-12-19 RX ADMIN — FINASTERIDE 5 MG: 5 TABLET, FILM COATED ORAL at 20:33

## 2024-12-19 RX ADMIN — ACETAMINOPHEN 1000 MG: 500 TABLET ORAL at 05:36

## 2024-12-19 RX ADMIN — DOCUSATE SODIUM 100 MG: 100 CAPSULE, LIQUID FILLED ORAL at 08:48

## 2024-12-19 RX ADMIN — CALCIUM 500 MG: 500 TABLET ORAL at 17:43

## 2024-12-19 RX ADMIN — TRAMADOL HYDROCHLORIDE 100 MG: 50 TABLET, COATED ORAL at 03:39

## 2024-12-19 RX ADMIN — ACETAMINOPHEN 1000 MG: 500 TABLET ORAL at 15:04

## 2024-12-19 RX ADMIN — SENNOSIDES 17.2 MG: 8.6 TABLET ORAL at 20:33

## 2024-12-19 ASSESSMENT — PAIN DESCRIPTION - ORIENTATION
ORIENTATION: LOWER
ORIENTATION: LEFT

## 2024-12-19 ASSESSMENT — PAIN DESCRIPTION - LOCATION
LOCATION: HIP
LOCATION: BACK

## 2024-12-19 ASSESSMENT — PAIN DESCRIPTION - DESCRIPTORS
DESCRIPTORS: ACHING
DESCRIPTORS: SHARP
DESCRIPTORS: DULL
DESCRIPTORS: ACHING;SHARP

## 2024-12-19 ASSESSMENT — PAIN SCALES - GENERAL
PAINLEVEL_OUTOF10: 0
PAINLEVEL_OUTOF10: 5
PAINLEVEL_OUTOF10: 8
PAINLEVEL_OUTOF10: 9
PAINLEVEL_OUTOF10: 7
PAINLEVEL_OUTOF10: 3
PAINLEVEL_OUTOF10: 4

## 2024-12-19 ASSESSMENT — PAIN - FUNCTIONAL ASSESSMENT: PAIN_FUNCTIONAL_ASSESSMENT: PREVENTS OR INTERFERES SOME ACTIVE ACTIVITIES AND ADLS

## 2024-12-19 ASSESSMENT — PAIN DESCRIPTION - FREQUENCY: FREQUENCY: INTERMITTENT

## 2024-12-19 ASSESSMENT — PAIN DESCRIPTION - PAIN TYPE: TYPE: ACUTE PAIN

## 2024-12-19 ASSESSMENT — PAIN DESCRIPTION - ONSET: ONSET: ON-GOING

## 2024-12-19 NOTE — PROGRESS NOTES
[facility]  INPATIENT PHYSICAL THERAPY  DAILY NOTE  [facility] - 8K-04/004-A      Discharge Recommendations: Continue to assess pending progress and Patient would benefit from continued PT at discharge  Equipment Recommendations: No (Continue to assess pending progress (Patient has RW and cane may require manual wheelchair))               Time In: 07  Time Out: 0834  Timed Code Treatment Minutes: 91 Minutes  Minutes: 91          Date: 2024  Patient Name: Gonzales Sheehan,  Gender:  male        MRN: [id number]  : 1940  (84 y.o.)     Referring Practitioner: Monserrat Hanson APRN - CNP (ordering); Priti Arreola DO (attending)  Diagnosis: Fall at home, initial encounter  Additional Pertinent Hx: Gonzales Sheehan is a 84 y.o. male admitted to [facility] on 2024. Patient  has a past medical history of AMD (age-related macular degeneration), wet (HCC), Bradycardia, Enlarged prostate, H/O cardiovascular stress test, H/O echocardiogram, History of Holter monitoring, Hyperlipidemia, Other and unspecified hyperlipidemia, PAF (paroxysmal atrial fibrillation) ([facility]), Psychosexual dysfunction with inhibited sexual excitement, and Unspecified hyperplasia of prostate without urinary obstruction and other lower urinary tract symptoms (LUTS).  Patient presented to [facility] from [facility] after suffering a fall downstairs.  Patient reported no loss of consciousness.  Patient reported getting up to use the restroom around 1:30 in the morning, he lost his balance, and fell down the stairway.  Denied head strike.  Wife was able to assist him and reportedly was moving him around the house his sheets.  Son arrived the following day and patient was unable to ambulate, he was taken to the ER for evaluation and found to have multiple pelvic fractures with associated hemorrhage and spinal fractures.  Patient on Eliquis for A-fib.  Patient's main complaint

## 2024-12-19 NOTE — PLAN OF CARE
Problem: Discharge Planning  Goal: Discharge to home or other facility with appropriate resources  12/19/2024 1326 by Freya Ortega LISW-S  Note: Team conference held Thursday, 12/19. Recommendations of the team were explained to the patient and his wife, Pat by Dr Arreola and SW. Team is recommending that patient continue on acute inpatient rehab for PT and OT, with expected discharge date of Tuesday, 12/24. Following discharge, team is recommending home health services for RN, PT, OT and HHA. Care plan reviewed with patient and Pat. Patient and Pat verbalized understanding of the plan of care and contributed to goal setting. SW to follow and maintain involvement in discharge planning.

## 2024-12-19 NOTE — CARE COORDINATION
Patient continues to only void small amounts and required to be straight cathed, amounts in the flow sheets. Patients scrotum also remains bruised and swollen. This nurse had patient apply ice to his scrotum, MD made aware. Patient continues to require Tramadol for  left pelvis and hip pain.

## 2024-12-19 NOTE — PLAN OF CARE
Problem: Discharge Planning  Goal: Discharge to home or other facility with appropriate resources  12/19/2024 0135 by Schuyler Giles, RN  Outcome: Progressing  Flowsheets (Taken 12/18/2024 2015)  Discharge to home or other facility with appropriate resources: Identify barriers to discharge with patient and caregiver     Problem: Safety - Adult  Goal: Free from fall injury  12/19/2024 0135 by Schuyler Giles, RN  Outcome: Progressing     Problem: ABCDS Injury Assessment  Goal: Absence of physical injury  12/19/2024 0135 by Schuyler Giles, RN  Outcome: Progressing     Problem: Pain  Goal: Verbalizes/displays adequate comfort level or baseline comfort level  12/19/2024 0135 by Schuyler Giles, RN  Outcome: Progressing  Flowsheets (Taken 12/18/2024 2015)  Verbalizes/displays adequate comfort level or baseline comfort level: Encourage patient to monitor pain and request assistance     Problem: Skin/Tissue Integrity  Goal: Absence of new skin breakdown  Description: 1.  Monitor for areas of redness and/or skin breakdown  2.  Assess vascular access sites hourly  3.  Every 4-6 hours minimum:  Change oxygen saturation probe site  4.  Every 4-6 hours:  If on nasal continuous positive airway pressure, respiratory therapy assess nares and determine need for appliance change or resting period.  12/19/2024 0135 by Schuyler Giles, RN  Outcome: Progressing

## 2024-12-19 NOTE — PROGRESS NOTES
[facility]  Occupational Therapy  Daily Note    Discharge Recommendations: Continue to assess pending progress and Patient would benefit from continued OT at discharge  Equipment Recommendations: Yes Continue to assess need for shower chair and LHAE, handouts provided to patient. Patient has ETS, grab in shower, family to install grab bar near toilet      Time In: 1100  Time Out: 1230  Timed Code Treatment Minutes: 90 Minutes  Minutes: 90          Date: 2024  Patient Name: Gonzales Sheehan,   Gender: male      Room: [facility]004-  MRN: [id number]  : 1940  (84 y.o.)  Referring Practitioner: Monserrat Hanson APRN - CNP (ordering); Priti Arreola DO (attending)  Diagnosis: Fall at home, initial encounter  Additional Pertinent Hx: Gonzales Sheehan is a 84 y.o. male admitted to [facility] on 2024. Patient  has a past medical history of AMD (age-related macular degeneration), wet (HCC), Bradycardia, Enlarged prostate, H/O cardiovascular stress test, H/O echocardiogram, History of Holter monitoring, Hyperlipidemia, Other and unspecified hyperlipidemia, PAF (paroxysmal atrial fibrillation) ([facility]), Psychosexual dysfunction with inhibited sexual excitement, and Unspecified hyperplasia of prostate without urinary obstruction and other lower urinary tract symptoms (LUTS).  Patient presented to [facility] from [facility] after suffering a fall downstairs.  Patient reported no loss of consciousness.  Patient reported getting up to use the restroom around 1:30 in the morning, he lost his balance, and fell down the stairway.  Denied head strike.  Wife was able to assist him and reportedly was moving him around the house his sheets.  Son arrived the following day and patient was unable to ambulate, he was taken to the ER for evaluation and found to have multiple pelvic fractures with associated hemorrhage and spinal fractures.  Patient on

## 2024-12-19 NOTE — CARE COORDINATION
Patient rested well last night. Flowmax tab restarted last night, patient was able to urinate multiple times during the shift with good amount-- no retention and dysuria noted. He had few episodes of back pain, managed by pain medication and repositioning. Vital signs within normal limits. Electronically signed by Schuyler Giles RN on 12/19/2024 at 6:24 AM

## 2024-12-19 NOTE — PLAN OF CARE
Problem: Discharge Planning  Goal: Discharge to home or other facility with appropriate resources  12/19/2024 1125 by Nina Merino RN  Outcome: Progressing  Flowsheets (Taken 12/19/2024 1125)  Discharge to home or other facility with appropriate resources:   Identify barriers to discharge with patient and caregiver   Arrange for needed discharge resources and transportation as appropriate   Identify discharge learning needs (meds, wound care, etc)   Refer to discharge planning if patient needs post-hospital services based on physician order or complex needs related to functional status, cognitive ability or social support system  Note: Patients discharge is planned for 12/24 home with wife and home health.  12/19/2024 0135 by Schuyler Giles RN  Outcome: Progressing  Flowsheets (Taken 12/18/2024 2015)  Discharge to home or other facility with appropriate resources: Identify barriers to discharge with patient and caregiver     Problem: Safety - Adult  Goal: Free from fall injury  12/19/2024 1125 by Nina Merino RN  Outcome: Progressing  Flowsheets (Taken 12/19/2024 1125)  Free From Fall Injury:   Instruct family/caregiver on patient safety   Based on caregiver fall risk screen, instruct family/caregiver to ask for assistance with transferring infant if caregiver noted to have fall risk factors  Note: Patient remains free from falls at this time. Patient requires 1 assist, walker nd gait belt for safe transfers and ambulation  12/19/2024 0135 by Schuyler Giles, RN  Outcome: Progressing     Problem: ABCDS Injury Assessment  Goal: Absence of physical injury  12/19/2024 1125 by Nina Merino RN  Outcome: Progressing  Flowsheets (Taken 12/19/2024 1125)  Absence of Physical Injury: Implement safety measures based on patient assessment  12/19/2024 0135 by Schuyler Giles RN  Outcome: Progressing     Problem: Pain  Goal: Verbalizes/displays adequate comfort level or baseline

## 2024-12-19 NOTE — PROGRESS NOTES
Physical Medicine & Rehabilitation   Progress Note    Chief Complaint:  Pelvic/sacral fractures     Subjective:  Patient seen this morning on rounds with FRANCISCO Montes,  following patient's inpatient Rehab Team Conference.  Patient seen with wife at bedside.  We discussed team conference and anticipated DC home next week with home health therapies.  Wife reports that she has been present for some therapy and has been able to see his progress.  Patient reports that he did have a bowel movement yesterday but this was hard and he is asking for additional stool softeners.  He reports significant loose stools with MiraLAX in the past and would prefer to avoid this.  No reports of any chest pain or shortness of breath.  He was urinating spontaneously last night.  However PVRs were not assessed. Will discuss with nursing to check PVR today      Rehabilitation: PT and OT updates reviewed during team rounds. See separate note.       Review of Systems:  CONSTITUTIONAL:  negative for  fevers and chills  EYES:  negative for  visual disturbance  HEENT:  negative for  nasal congestion and sore throat  RESPIRATORY:  negative for  dyspnea and wheezing  CARDIOVASCULAR:  negative for  chest pain, palpitations  GASTROINTESTINAL:  negative for nausea, vomiting, and diarrhea  GENITOURINARY:  negative for dysuria; per patient, positive for recent urinary retention  SKIN:  negative for rash  MUSCULOSKELETAL:  positive for  arthralgias, pain, decreased range of motion, and bone pain  NEUROLOGICAL:  positive for gait problems, negative for numbness or tingling  BEHAVIOR/PSYCH:  negative  10 point system review otherwise negative      Objective:  /80   Pulse 78   Temp 97.6 °F (36.4 °C) (Oral)   Resp 18   Ht 1.88 m (6' 2.02\")   Wt 96.5 kg (212 lb 11.9 oz)   SpO2 94%   BMI 27.30 kg/m²   Patient is awake and alert. Sitting up in bedside chair with wife at bedside   Orientation: not reassessed today  Mood: within normal

## 2024-12-20 LAB — MAGNESIUM SERPL-MCNC: 2.1 MG/DL (ref 1.6–2.4)

## 2024-12-20 PROCEDURE — 6370000000 HC RX 637 (ALT 250 FOR IP): Performed by: NURSE PRACTITIONER

## 2024-12-20 PROCEDURE — 6370000000 HC RX 637 (ALT 250 FOR IP): Performed by: FAMILY MEDICINE

## 2024-12-20 PROCEDURE — 6370000000 HC RX 637 (ALT 250 FOR IP): Performed by: STUDENT IN AN ORGANIZED HEALTH CARE EDUCATION/TRAINING PROGRAM

## 2024-12-20 PROCEDURE — 97116 GAIT TRAINING THERAPY: CPT

## 2024-12-20 PROCEDURE — 97110 THERAPEUTIC EXERCISES: CPT

## 2024-12-20 PROCEDURE — 36415 COLL VENOUS BLD VENIPUNCTURE: CPT

## 2024-12-20 PROCEDURE — 51798 US URINE CAPACITY MEASURE: CPT

## 2024-12-20 PROCEDURE — 97535 SELF CARE MNGMENT TRAINING: CPT

## 2024-12-20 PROCEDURE — 51701 INSERT BLADDER CATHETER: CPT

## 2024-12-20 PROCEDURE — 97530 THERAPEUTIC ACTIVITIES: CPT

## 2024-12-20 PROCEDURE — 51702 INSERT TEMP BLADDER CATH: CPT

## 2024-12-20 PROCEDURE — 1180000000 HC REHAB R&B

## 2024-12-20 PROCEDURE — 99232 SBSQ HOSP IP/OBS MODERATE 35: CPT | Performed by: STUDENT IN AN ORGANIZED HEALTH CARE EDUCATION/TRAINING PROGRAM

## 2024-12-20 PROCEDURE — 83735 ASSAY OF MAGNESIUM: CPT

## 2024-12-20 RX ADMIN — SENNOSIDES 17.2 MG: 8.6 TABLET ORAL at 21:10

## 2024-12-20 RX ADMIN — CALCIUM 500 MG: 500 TABLET ORAL at 18:20

## 2024-12-20 RX ADMIN — Medication 5000 UNITS: at 09:03

## 2024-12-20 RX ADMIN — FINASTERIDE 5 MG: 5 TABLET, FILM COATED ORAL at 21:06

## 2024-12-20 RX ADMIN — CALCIUM 500 MG: 500 TABLET ORAL at 09:03

## 2024-12-20 RX ADMIN — ACETAMINOPHEN 1000 MG: 500 TABLET ORAL at 06:12

## 2024-12-20 RX ADMIN — Medication 400 MG: at 21:06

## 2024-12-20 RX ADMIN — TRAMADOL HYDROCHLORIDE 100 MG: 50 TABLET, COATED ORAL at 12:34

## 2024-12-20 RX ADMIN — CYCLOBENZAPRINE 10 MG: 10 TABLET, FILM COATED ORAL at 01:54

## 2024-12-20 RX ADMIN — DOCUSATE SODIUM 200 MG: 100 CAPSULE, LIQUID FILLED ORAL at 21:10

## 2024-12-20 RX ADMIN — TRAMADOL HYDROCHLORIDE 100 MG: 50 TABLET, COATED ORAL at 06:12

## 2024-12-20 RX ADMIN — TAMSULOSIN HYDROCHLORIDE 0.8 MG: 0.4 CAPSULE ORAL at 21:06

## 2024-12-20 RX ADMIN — FAMOTIDINE 20 MG: 20 TABLET, FILM COATED ORAL at 21:10

## 2024-12-20 RX ADMIN — APIXABAN 5 MG: 5 TABLET, FILM COATED ORAL at 09:04

## 2024-12-20 RX ADMIN — Medication 400 MG: at 09:03

## 2024-12-20 RX ADMIN — ACETAMINOPHEN 1000 MG: 500 TABLET ORAL at 21:11

## 2024-12-20 RX ADMIN — ACETAMINOPHEN 1000 MG: 500 TABLET ORAL at 15:22

## 2024-12-20 RX ADMIN — DOCUSATE SODIUM 200 MG: 100 CAPSULE, LIQUID FILLED ORAL at 09:04

## 2024-12-20 RX ADMIN — ATORVASTATIN CALCIUM 40 MG: 40 TABLET, FILM COATED ORAL at 21:06

## 2024-12-20 RX ADMIN — APIXABAN 5 MG: 5 TABLET, FILM COATED ORAL at 21:06

## 2024-12-20 RX ADMIN — FAMOTIDINE 20 MG: 20 TABLET, FILM COATED ORAL at 09:03

## 2024-12-20 ASSESSMENT — PAIN DESCRIPTION - PAIN TYPE
TYPE: ACUTE PAIN
TYPE: ACUTE PAIN

## 2024-12-20 ASSESSMENT — PAIN SCALES - GENERAL
PAINLEVEL_OUTOF10: 7
PAINLEVEL_OUTOF10: 5
PAINLEVEL_OUTOF10: 7
PAINLEVEL_OUTOF10: 10
PAINLEVEL_OUTOF10: 7
PAINLEVEL_OUTOF10: 7
PAINLEVEL_OUTOF10: 4

## 2024-12-20 ASSESSMENT — PAIN SCALES - WONG BAKER
WONGBAKER_NUMERICALRESPONSE: HURTS A LITTLE BIT
WONGBAKER_NUMERICALRESPONSE: HURTS A LITTLE BIT

## 2024-12-20 ASSESSMENT — PAIN DESCRIPTION - DESCRIPTORS
DESCRIPTORS: SHARP
DESCRIPTORS: ACHING;DISCOMFORT
DESCRIPTORS: ACHING;SHARP
DESCRIPTORS: ACHING;DISCOMFORT

## 2024-12-20 ASSESSMENT — PAIN DESCRIPTION - ONSET
ONSET: ON-GOING
ONSET: ON-GOING

## 2024-12-20 ASSESSMENT — PAIN DESCRIPTION - LOCATION
LOCATION: BUTTOCKS
LOCATION: HIP

## 2024-12-20 ASSESSMENT — PAIN DESCRIPTION - ORIENTATION
ORIENTATION: LEFT
ORIENTATION: LEFT
ORIENTATION: RIGHT
ORIENTATION: LEFT;MID

## 2024-12-20 ASSESSMENT — PAIN DESCRIPTION - FREQUENCY: FREQUENCY: INTERMITTENT

## 2024-12-20 NOTE — PLAN OF CARE
Problem: Discharge Planning  Goal: Discharge to home or other facility with appropriate resources  12/19/2024 2310 by Radha Almaraz, RN  Outcome: Progressing  Flowsheets (Taken 12/19/2024 2310)  Discharge to home or other facility with appropriate resources:   Identify barriers to discharge with patient and caregiver   Identify discharge learning needs (meds, wound care, etc)   Refer to discharge planning if patient needs post-hospital services based on physician order or complex needs related to functional status, cognitive ability or social support system   Arrange for needed discharge resources and transportation as appropriate     Problem: Safety - Adult  Goal: Free from fall injury  12/19/2024 2310 by Radha Almaraz, RN  Outcome: Progressing  Flowsheets (Taken 12/19/2024 2310)  Free From Fall Injury:   Instruct family/caregiver on patient safety   Based on caregiver fall risk screen, instruct family/caregiver to ask for assistance with transferring infant if caregiver noted to have fall risk factors     Problem: ABCDS Injury Assessment  Goal: Absence of physical injury  12/19/2024 2310 by Radha Almaraz, RN  Outcome: Progressing  Flowsheets (Taken 12/19/2024 2310)  Absence of Physical Injury: Implement safety measures based on patient assessment     Problem: Pain  Goal: Verbalizes/displays adequate comfort level or baseline comfort level  12/19/2024 2310 by Radha Almaraz, RN  Outcome: Progressing  Flowsheets (Taken 12/19/2024 2310)  Verbalizes/displays adequate comfort level or baseline comfort level:   Encourage patient to monitor pain and request assistance   Administer analgesics based on type and severity of pain and evaluate response   Implement non-pharmacological measures as appropriate and evaluate response   Assess pain using appropriate pain scale     Problem: Skin/Tissue Integrity  Goal: Absence of new skin breakdown  Description: 1.  Monitor for areas of redness and/or skin breakdown  2.

## 2024-12-20 NOTE — PROGRESS NOTES
both lower extremities: ,Glut sets, Seated marches, Seated hamstring curls, Seated heel/toe raises, Long arc quads, Seated isometric hip adduction, and Seated abduction/adduction.  Exercises were completed for increased independence with functional mobility.    OT 12/19/2024:  ADL:   Lower Extremity Dressing: Stand By Assistance.  With increased time and use of reacher to doff and don pants  Footwear Management: Stand By Assistance, with verbal cues , and with increased time for completion.  To doff tennis shoes and to don, SBA to tie R shoe with figure four technique and total Assistance to tie L shoe.   Toileting: Contact Guard Assistance.     Toilet Transfer: Contact Guard Assistance.   .  ** Pt. And spouse educated on LHAE and availability for purchase from this facility and others.  Spouse demo difficulty with recall asking 3 times what to purchase.       IADL:   Meal Preparation: Pt. Completed IADL task of meal prep cooking a grilled cheese at stove top.  Pt. Completed gathering of items from cupboards and fridge with CGA, and performed sliding method along countertop to transport.  Pt. Demo good safety at stove top turning stove on and off.  Pt. Stood x 8 mins consecutively prior to needing to sit.  Pt. Educated on safety with stabilizing self during unilateral reaching and with safe RW placement. Pt. Did not complete clean up due to session time coming to an end.      BALANCE:  Standing Balance: Contact Guard Assistance.       TRANSFERS:  Sit to Stand:  Contact Guard Assistance.    Stand to Sit: Contact Guard Assistance.       FUNCTIONAL MOBILITY:  Assistive Device: Rolling Walker  Assist Level:  Contact Guard Assistance.   Distance: To and from bathroom, Within room, and within apartment        ADDITIONAL ACTIVITIES:  Patient completed BUE strengthening exercises with skilled education on HEP: completed x12 reps x1 set with 2# dowel in all joints and all planes in order to improve UE strength and activity  able to provide history  Attention/Concentration: He easily follows content of conversation  Language:  normal     Cranial Nerves:  cranial nerves II-XII are grossly intact  ROM:  abnormal -BLE related to pain  Motor Exam: Abraham to take resistance in all extremities   Tone:  normal  Muscle bulk: within normal limits  Sensory:  Sensory intact to light touch  Gait: Not assessed     Heart: Well-perfused extremities, RRR, no m/r/g noted  Lungs: breathing comfortably on room air without increased work of breathing; CTAB  Abdomen: soft and non-distended  Skin: warm and dry, no rash or erythema      Diagnostics:   Recent Results (from the past 24 hour(s))   Magnesium    Collection Time: 12/20/24  6:36 AM   Result Value Ref Range    Magnesium 2.1 1.6 - 2.4 mg/dL           Impression:  Trauma by fall  Multiple pelvic/ pubic fractures with associated hemorrhage; left sacral ala, comminuted and mildly displaced. Pubic symphyseal diastasis  Pelvic hemorrhage and hematoma, complicated by anticoagulant   Lumbar and Sacral spine fractures; Nondisplaced left L5 and L4 transverse process fractures. Fracture of the superior S5 vertebral body, nondisplaced.  Elevated CK   Bradycardia  enlarged prostate  A-fib  HLD    Plan:  Continue inpatient rehabilitation program involving at least 3 hours per day, 5 days per week of intensive rehabilitation.  Rehabilitation services will include PT and OT/RT in order to improve functional status prior to discharge.  Family education and training will be completed.  Equipment evaluations and recommendations will be completed as appropriate.       Rehabilitation nursing will be involved for bowel, bladder, skin, and pain management.  Nursing will also provide education and training to patient and family.    Multiple Fractures: 2/2 mechanical fall down flight of stairs. Pelvic/sacral fractures, lumbar and sacral spine fractures. WBAT BLE. Patient to follow up with ortho in 2-3 weeks with repeat

## 2024-12-20 NOTE — PLAN OF CARE
Problem: Discharge Planning  Goal: Discharge to home or other facility with appropriate resources  12/20/2024 1716 by Freya Ortega LISW-S  Note: SW met with patient's family on this date regarding discharge planning. SW answered questions regarding discharge including home health services. Elena reports that their choices are [facility], [facility] or [facility]. Family training was scheduled for Monday, 12/23 at 9 AM. No outstanding needs. SW will follow and maintain involvement in discharge planning.

## 2024-12-20 NOTE — PROGRESS NOTES
[facility]  INPATIENT PHYSICAL THERAPY  DAILY NOTE  [facility] - 8K-04/004-A      Discharge Recommendations: Continue to assess pending progress  Equipment Recommendations: No (Continue to assess pending progress (Patient has RW and cane may require manual wheelchair))               Time In: 702  Time Out: 832  Timed Code Treatment Minutes: 90 Minutes  Minutes: 90          Date: 2024  Patient Name: Gonzales Sheehan,  Gender:  male        MRN: [id number]  : 1940  (84 y.o.)     Referring Practitioner: Monserrat Hanson APRN - CNP (ordering); Priti Arreola DO (attending)  Diagnosis: Fall at home, initial encounter  Additional Pertinent Hx: Gonzales Sheehan is a 84 y.o. male admitted to [facility] on 2024. Patient  has a past medical history of AMD (age-related macular degeneration), wet ([facility]), Bradycardia, Enlarged prostate, H/O cardiovascular stress test, H/O echocardiogram, History of Holter monitoring, Hyperlipidemia, Other and unspecified hyperlipidemia, PAF (paroxysmal atrial fibrillation) ([facility]), Psychosexual dysfunction with inhibited sexual excitement, and Unspecified hyperplasia of prostate without urinary obstruction and other lower urinary tract symptoms (LUTS).  Patient presented to [facility] from [facility] after suffering a fall downstairs.  Patient reported no loss of consciousness.  Patient reported getting up to use the restroom around 1:30 in the morning, he lost his balance, and fell down the stairway.  Denied head strike.  Wife was able to assist him and reportedly was moving him around the house his sheets.  Son arrived the following day and patient was unable to ambulate, he was taken to the ER for evaluation and found to have multiple pelvic fractures with associated hemorrhage and spinal fractures.  Patient on Eliquis for A-fib.  Patient's main complaint with left-sided pelvic pain and groin pain.  Patient was

## 2024-12-20 NOTE — PROGRESS NOTES
Patient: Gonzales Sheehan  Unit/Bed: 8K-04/004-A  YOB: 1940  MRN: [id number] Acct: [id number]   Admitting Diagnosis: Fall at home, initial encounter [W19.XXXA, Y92.009]  Admit Date:  12/10/2024  Hospital Day: 10    Assessment:     Principal Problem:    Fall at home, initial encounter  Resolved Problems:    * No resolved hospital problems. *      Plan:     We reviewed the follow up magnesium level looking better.        Subjective:     Patient has no complaint of CP or SOB..   Medication side effects: none    Scheduled Meds:   docusate sodium  200 mg Oral BID    tamsulosin  0.8 mg Oral Daily    magnesium oxide  400 mg Oral BID    acetaminophen  1,000 mg Oral 3 times per day    Vitamin D  5,000 Units Oral Daily    finasteride  5 mg Oral Daily    atorvastatin  40 mg Oral Nightly    apixaban  5 mg Oral BID    senna  2 tablet Oral Nightly    famotidine  20 mg Oral BID    calcium elemental  500 mg Oral BID WC     Continuous Infusions:   sodium chloride       PRN Meds:polyethylene glycol, calcium carbonate, traMADol **OR** traMADol, ondansetron, cyclobenzaprine, sodium chloride flush, potassium chloride **OR** potassium chloride, magnesium sulfate, bisacodyl, sodium chloride, Menthol    Review of Systems  Pertinent items are noted in HPI.    Objective:     Patient Vitals for the past 8 hrs:   Resp   12/20/24 1304 18     I/O last 3 completed shifts:  In: 1960 [P.O.:1960]  Out: 4030 [Urine:4030]  I/O this shift:  In: -   Out: 1350 [Urine:1350]    /73   Pulse 66   Temp 97.3 °F (36.3 °C) (Oral)   Resp 18   Ht 1.88 m (6' 2.02\")   Wt 98.1 kg (216 lb 4.3 oz)   SpO2 98%   BMI 27.76 kg/m²     General appearance: alert, appears stated age, and cooperative  Head: Normocephalic, without obvious abnormality, atraumatic  Lungs: clear to auscultation bilaterally  Chest wall: no tenderness  Heart: regular rate and rhythm, S1, S2 normal, no murmur, click, rub or gallop  Abdomen: soft, non-tender; bowel sounds

## 2024-12-20 NOTE — PROGRESS NOTES
[facility]  Occupational Therapy  Daily Note    Discharge Recommendations: Continue to assess pending progress  Equipment Recommendations: Yes Continue to assess need for shower chair and LHAE, handouts provided to patient. Patient has ETS, grab in shower, family to install grab bar near toilet      Time In: 0930  Time Out: 1100  Timed Code Treatment Minutes: 90 Minutes  Minutes: 90          Date: 2024  Patient Name: Gonzales Sheehan,   Gender: male      Room: [facility]004-A  MRN: [id number]  : 1940  (84 y.o.)  Referring Practitioner: Monserrat Hanson APRN - CNP (ordering); Priti Arreola DO (attending)  Diagnosis: Fall at home, initial encounter  Additional Pertinent Hx: Gonzales Sheehan is a 84 y.o. male admitted to [facility] on 2024. Patient  has a past medical history of AMD (age-related macular degeneration), wet (HCC), Bradycardia, Enlarged prostate, H/O cardiovascular stress test, H/O echocardiogram, History of Holter monitoring, Hyperlipidemia, Other and unspecified hyperlipidemia, PAF (paroxysmal atrial fibrillation) ([facility]), Psychosexual dysfunction with inhibited sexual excitement, and Unspecified hyperplasia of prostate without urinary obstruction and other lower urinary tract symptoms (LUTS).  Patient presented to [facility] from [facility] after suffering a fall downstairs.  Patient reported no loss of consciousness.  Patient reported getting up to use the restroom around 1:30 in the morning, he lost his balance, and fell down the stairway.  Denied head strike.  Wife was able to assist him and reportedly was moving him around the house his sheets.  Son arrived the following day and patient was unable to ambulate, he was taken to the ER for evaluation and found to have multiple pelvic fractures with associated hemorrhage and spinal fractures.  Patient on Eliquis for A-fib.  Patient's main complaint with left-sided  of 91/100    Following session, patient left in safe position with all fall risk precautions in place.

## 2024-12-20 NOTE — PLAN OF CARE
Problem: Discharge Planning  Goal: Discharge to home or other facility with appropriate resources  12/20/2024 1143 by Jillian Light LPN  Outcome: Progressing  Flowsheets (Taken 12/20/2024 1143)  Discharge to home or other facility with appropriate resources:   Identify barriers to discharge with patient and caregiver   Identify discharge learning needs (meds, wound care, etc)   Refer to discharge planning if patient needs post-hospital services based on physician order or complex needs related to functional status, cognitive ability or social support system   Arrange for needed discharge resources and transportation as appropriate  Note: Discharge 12/24/24 12/19/2024 2310 by Radha Almaraz, RN  Outcome: Progressing  Flowsheets (Taken 12/19/2024 2310)  Discharge to home or other facility with appropriate resources:   Identify barriers to discharge with patient and caregiver   Identify discharge learning needs (meds, wound care, etc)   Refer to discharge planning if patient needs post-hospital services based on physician order or complex needs related to functional status, cognitive ability or social support system   Arrange for needed discharge resources and transportation as appropriate     Problem: Safety - Adult  Goal: Free from fall injury  12/20/2024 1143 by Jillian Light LPN  Outcome: Progressing  Flowsheets (Taken 12/20/2024 1143)  Free From Fall Injury:   Instruct family/caregiver on patient safety   Based on caregiver fall risk screen, instruct family/caregiver to ask for assistance with transferring infant if caregiver noted to have fall risk factors  12/19/2024 2310 by Radha Almaraz, RN  Outcome: Progressing  Flowsheets (Taken 12/19/2024 2310)  Free From Fall Injury:   Instruct family/caregiver on patient safety   Based on caregiver fall risk screen, instruct family/caregiver to ask for assistance with transferring infant if caregiver noted to have fall risk factors     Problem: ABCDS  improved  12/20/2024 1143 by Jillian Light LPN  Outcome: Progressing  Flowsheets (Taken 12/20/2024 1143)  Care Plan - Patient's Chronic Conditions and Co-Morbidity Symptoms are Monitored and Maintained or Improved:   Monitor and assess patient's chronic conditions and comorbid symptoms for stability, deterioration, or improvement   Update acute care plan with appropriate goals if chronic or comorbid symptoms are exacerbated and prevent overall improvement and discharge  12/19/2024 2310 by Radha Almaraz, RN  Outcome: Progressing  Flowsheets (Taken 12/19/2024 2310)  Care Plan - Patient's Chronic Conditions and Co-Morbidity Symptoms are Monitored and Maintained or Improved:   Monitor and assess patient's chronic conditions and comorbid symptoms for stability, deterioration, or improvement   Collaborate with multidisciplinary team to address chronic and comorbid conditions and prevent exacerbation or deterioration   Update acute care plan with appropriate goals if chronic or comorbid symptoms are exacerbated and prevent overall improvement and discharge     Problem: Nutrition Deficit:  Goal: Optimize nutritional status  12/20/2024 1143 by Jillian Light LPN  Outcome: Progressing  Flowsheets (Taken 12/20/2024 1143)  Nutrient intake appropriate for improving, restoring, or maintaining nutritional needs:   Assess nutritional status and recommend course of action   Monitor oral intake, labs, and treatment plans  12/19/2024 2310 by Radha Almaraz, RN  Outcome: Progressing  Flowsheets (Taken 12/19/2024 2310)  Nutrient intake appropriate for improving, restoring, or maintaining nutritional needs:   Assess nutritional status and recommend course of action   Monitor oral intake, labs, and treatment plans

## 2024-12-20 NOTE — CONSULTS
[facility]  [address] [address]  Dept: [phone]  Loc: [phone]  Visit Date: 12/10/2024    Urology Consult Note    Reason for Consult:  retention  Requesting Physician:  medicine    History Obtained From:  patient, electronic medical record    Chief Complaint: urinary retention    HISTORY OF PRESENT ILLNESS:                The patient is a 84 y.o. male with significant past medical history of BPH, elevated PSA, and negative prostate biopsy x2 who presents with urinary retention and BPH. Patient was initially transferred from [facility] on 12/8 for pelvic and sacral fractures secondary to a fall down the stairs at home. Trauma surgery and orthopedics determined injuries were non operative and patient was discharged to inpatient rehab. Flomax was held from hypotension and patient subsequently developed urinary retention. Flomax resumed on 12/16 with patient continuing with high PVRs. Urine was negative for infection, Flomax was increased to 0.8mg and patient continued to have PVRs in 300-500ml range and was requiring straight catheterization. Urology was subsequently consulted. Patient noted to have enlarged prostate on CT and prior Urology notes from 2015. He last saw Dr. Ronak De Oliveira in 2015 where he was noted to have trilobar enlargement of the prostate. He has had 2 negative biopsies for elevated PSA. Patient reports prior TURP performed. Today, he notes some constipation and limited mobility. He denies abdominal and flank pain, but notes suprapubic tenderness and incomplete emptying. Denies any hematuria, urgency, or frequency but notes weak stream.     Past Medical History:        Diagnosis Date    AMD (age-related macular degeneration), wet (HCC)     Bradycardia     Enlarged prostate     H/O cardiovascular stress test 02/13/2018    Equivocal myocardial perfusion study. There is a small/moderate perfusion  Resource Strain: Low Risk  (12/11/2023)    Overall Financial Resource Strain (CARDIA)     Difficulty of Paying Living Expenses: Not hard at all   Food Insecurity: No Food Insecurity (12/10/2024)    Hunger Vital Sign     Worried About Running Out of Food in the Last Year: Never true     Ran Out of Food in the Last Year: Never true   Transportation Needs: No Transportation Needs (12/10/2024)    PRAPARE - Transportation     Lack of Transportation (Medical): No     Lack of Transportation (Non-Medical): No   Physical Activity: Insufficiently Active (12/11/2023)    Exercise Vital Sign     Days of Exercise per Week: 3 days     Minutes of Exercise per Session: 30 min   Stress: No Stress Concern Present (12/11/2023)    [place] [employer] - Occupational Stress Questionnaire     Feeling of Stress : Not at all   Social Connections: Socially Integrated (12/11/2023)    Social Connection and Isolation Panel [NHANES]     Frequency of Communication with Friends and Family: More than three times a week     Frequency of Social Gatherings with Friends and Family: More than three times a week     Attends [employer] Services: More than 4 times per year     Active Member of Clubs or Organizations: Yes     Attends Club or Organization Meetings: 1 to 4 times per year     Marital Status:    Intimate Partner Violence: Not At Risk (12/11/2023)    Humiliation, Afraid, Rape, and Kick questionnaire     Fear of Current or Ex-Partner: No     Emotionally Abused: No     Physically Abused: No     Sexually Abused: No   Housing Stability: Low Risk  (12/10/2024)    Housing Stability Vital Sign     Unable to Pay for Housing in the Last Year: No     Number of Times Moved in the Last Year: 1     Homeless in the Last Year: No     Family History:       Problem Relation Age of Onset    Heart Disease Mother     Liver Cancer Father     Diabetes Father     Breast Cancer Sister     Cancer Son         bladder    Breast Cancer Daughter

## 2024-12-20 NOTE — PROGRESS NOTES
Patient: Gonzales Sheehan  Unit/Bed: 8K-04/004-A  YOB: 1940  MRN: [id number] Acct: [id number]   Admitting Diagnosis: Fall at home, initial encounter [W19.XXXA, Y92.009]  Admit Date:  12/10/2024  Hospital Day: 10    Assessment:     Principal Problem:    Fall at home, initial encounter  Resolved Problems:    * No resolved hospital problems. *      Plan:     Check the Mag in the AM        Subjective:     Patient has no complaint of CP or SOB. We discussed the bladder troubles he's having..   Medication side effects: none    Scheduled Meds:   docusate sodium  200 mg Oral BID    tamsulosin  0.8 mg Oral Daily    magnesium oxide  400 mg Oral BID    acetaminophen  1,000 mg Oral 3 times per day    Vitamin D  5,000 Units Oral Daily    finasteride  5 mg Oral Daily    atorvastatin  40 mg Oral Nightly    apixaban  5 mg Oral BID    senna  2 tablet Oral Nightly    famotidine  20 mg Oral BID    calcium elemental  500 mg Oral BID WC     Continuous Infusions:   sodium chloride       PRN Meds:polyethylene glycol, calcium carbonate, traMADol **OR** traMADol, ondansetron, cyclobenzaprine, sodium chloride flush, potassium chloride **OR** potassium chloride, magnesium sulfate, bisacodyl, sodium chloride, Menthol    Review of Systems  Pertinent items are noted in HPI.    Objective:     Patient Vitals for the past 8 hrs:   Weight   12/20/24 0419 98.1 kg (216 lb 4.3 oz)     I/O last 3 completed shifts:  In: 1840 [P.O.:1840]  Out: 4030 [Urine:4030]  No intake/output data recorded.    BP (!) 147/67   Pulse 77   Temp 97.7 °F (36.5 °C) (Oral)   Resp 18   Ht 1.88 m (6' 2.02\")   Wt 98.1 kg (216 lb 4.3 oz)   SpO2 95%   BMI 27.76 kg/m²     General appearance: alert, appears stated age, and cooperative  Head: Normocephalic, without obvious abnormality, atraumatic  Lungs: clear to auscultation bilaterally  Chest wall: no tenderness  Heart: regular rate and rhythm, S1, S2 normal, no murmur, click, rub or gallop  Abdomen: soft,

## 2024-12-21 PROCEDURE — 6370000000 HC RX 637 (ALT 250 FOR IP): Performed by: NURSE PRACTITIONER

## 2024-12-21 PROCEDURE — 6370000000 HC RX 637 (ALT 250 FOR IP): Performed by: FAMILY MEDICINE

## 2024-12-21 PROCEDURE — 1180000000 HC REHAB R&B

## 2024-12-21 PROCEDURE — 99231 SBSQ HOSP IP/OBS SF/LOW 25: CPT | Performed by: UROLOGY

## 2024-12-21 PROCEDURE — 6370000000 HC RX 637 (ALT 250 FOR IP): Performed by: STUDENT IN AN ORGANIZED HEALTH CARE EDUCATION/TRAINING PROGRAM

## 2024-12-21 RX ADMIN — ACETAMINOPHEN 1000 MG: 500 TABLET ORAL at 14:12

## 2024-12-21 RX ADMIN — TAMSULOSIN HYDROCHLORIDE 0.8 MG: 0.4 CAPSULE ORAL at 20:56

## 2024-12-21 RX ADMIN — ACETAMINOPHEN 1000 MG: 500 TABLET ORAL at 04:38

## 2024-12-21 RX ADMIN — TRAMADOL HYDROCHLORIDE 100 MG: 50 TABLET, COATED ORAL at 04:37

## 2024-12-21 RX ADMIN — Medication 5000 UNITS: at 08:04

## 2024-12-21 RX ADMIN — FAMOTIDINE 20 MG: 20 TABLET, FILM COATED ORAL at 20:55

## 2024-12-21 RX ADMIN — FINASTERIDE 5 MG: 5 TABLET, FILM COATED ORAL at 20:56

## 2024-12-21 RX ADMIN — DOCUSATE SODIUM 200 MG: 100 CAPSULE, LIQUID FILLED ORAL at 08:05

## 2024-12-21 RX ADMIN — ATORVASTATIN CALCIUM 40 MG: 40 TABLET, FILM COATED ORAL at 20:56

## 2024-12-21 RX ADMIN — SENNOSIDES 17.2 MG: 8.6 TABLET ORAL at 20:55

## 2024-12-21 RX ADMIN — APIXABAN 5 MG: 5 TABLET, FILM COATED ORAL at 20:56

## 2024-12-21 RX ADMIN — FAMOTIDINE 20 MG: 20 TABLET, FILM COATED ORAL at 08:05

## 2024-12-21 RX ADMIN — DOCUSATE SODIUM 200 MG: 100 CAPSULE, LIQUID FILLED ORAL at 20:56

## 2024-12-21 RX ADMIN — APIXABAN 5 MG: 5 TABLET, FILM COATED ORAL at 08:05

## 2024-12-21 RX ADMIN — ACETAMINOPHEN 1000 MG: 500 TABLET ORAL at 20:55

## 2024-12-21 RX ADMIN — TRAMADOL HYDROCHLORIDE 100 MG: 50 TABLET, COATED ORAL at 11:49

## 2024-12-21 RX ADMIN — CALCIUM 500 MG: 500 TABLET ORAL at 08:05

## 2024-12-21 RX ADMIN — Medication 400 MG: at 08:05

## 2024-12-21 RX ADMIN — CALCIUM 500 MG: 500 TABLET ORAL at 18:39

## 2024-12-21 RX ADMIN — Medication 400 MG: at 20:56

## 2024-12-21 ASSESSMENT — PAIN SCALES - WONG BAKER
WONGBAKER_NUMERICALRESPONSE: HURTS A LITTLE BIT
WONGBAKER_NUMERICALRESPONSE: HURTS A LITTLE BIT

## 2024-12-21 ASSESSMENT — PAIN DESCRIPTION - LOCATION
LOCATION: BACK
LOCATION: HIP
LOCATION: HIP
LOCATION: HIP;LEG
LOCATION: HIP

## 2024-12-21 ASSESSMENT — PAIN DESCRIPTION - DESCRIPTORS
DESCRIPTORS: ACHING;DISCOMFORT
DESCRIPTORS: ACHING;DISCOMFORT
DESCRIPTORS: DISCOMFORT;ACHING
DESCRIPTORS: ACHING;DISCOMFORT

## 2024-12-21 ASSESSMENT — PAIN DESCRIPTION - ORIENTATION
ORIENTATION: LEFT
ORIENTATION: MID;LEFT
ORIENTATION: LEFT

## 2024-12-21 ASSESSMENT — PAIN DESCRIPTION - ONSET: ONSET: ON-GOING

## 2024-12-21 ASSESSMENT — PAIN SCALES - GENERAL
PAINLEVEL_OUTOF10: 5
PAINLEVEL_OUTOF10: 9
PAINLEVEL_OUTOF10: 10
PAINLEVEL_OUTOF10: 4
PAINLEVEL_OUTOF10: 8
PAINLEVEL_OUTOF10: 8

## 2024-12-21 ASSESSMENT — PAIN - FUNCTIONAL ASSESSMENT: PAIN_FUNCTIONAL_ASSESSMENT: ACTIVITIES ARE NOT PREVENTED

## 2024-12-21 ASSESSMENT — PAIN DESCRIPTION - PAIN TYPE: TYPE: ACUTE PAIN

## 2024-12-21 NOTE — CARE COORDINATION
Galeana catheter in place draining clear yellow urine. Up to the bathroom with one assist and states that his scheduled Tylenol has been effective for his pain management. Resting quietly in bed @ present time. No discomfort noted.

## 2024-12-21 NOTE — PLAN OF CARE
Problem: Discharge Planning  Goal: Discharge to home or other facility with appropriate resources  12/21/2024 1114 by Jillian Light LPN  Outcome: Progressing  Flowsheets (Taken 12/21/2024 1114)  Discharge to home or other facility with appropriate resources:   Identify barriers to discharge with patient and caregiver   Identify discharge learning needs (meds, wound care, etc)   Refer to discharge planning if patient needs post-hospital services based on physician order or complex needs related to functional status, cognitive ability or social support system   Arrange for needed discharge resources and transportation as appropriate  Note: Discharge 12/24/24 12/21/2024 0355 by Sosa Gonzales, RN  Outcome: Progressing  Flowsheets (Taken 12/20/2024 2130)  Discharge to home or other facility with appropriate resources: Identify barriers to discharge with patient and caregiver     Problem: Safety - Adult  Goal: Free from fall injury  12/21/2024 1114 by Jillian Light LPN  Outcome: Progressing  Flowsheets (Taken 12/21/2024 1114)  Free From Fall Injury: Based on caregiver fall risk screen, instruct family/caregiver to ask for assistance with transferring infant if caregiver noted to have fall risk factors  12/21/2024 0355 by Sosa Gonzales, RN  Outcome: Progressing     Problem: ABCDS Injury Assessment  Goal: Absence of physical injury  12/21/2024 1114 by Jillian Light LPN  Outcome: Progressing  Flowsheets (Taken 12/21/2024 1114)  Absence of Physical Injury: Implement safety measures based on patient assessment  12/21/2024 0355 by Sosa Gonzales, RN  Outcome: Progressing     Problem: Pain  Goal: Verbalizes/displays adequate comfort level or baseline comfort level  12/21/2024 1114 by Jillian Light LPN  Outcome: Progressing  Flowsheets (Taken 12/21/2024 1114)  Verbalizes/displays adequate comfort level or baseline comfort level: Encourage patient to monitor pain and request assistance  12/21/2024 0355  by Sosa Gonzales, RN  Outcome: Progressing  Flowsheets (Taken 12/20/2024 2100)  Verbalizes/displays adequate comfort level or baseline comfort level: Encourage patient to monitor pain and request assistance     Problem: Skin/Tissue Integrity  Goal: Absence of new skin breakdown  Description: 1.  Monitor for areas of redness and/or skin breakdown  2.  Assess vascular access sites hourly  3.  Every 4-6 hours minimum:  Change oxygen saturation probe site  4.  Every 4-6 hours:  If on nasal continuous positive airway pressure, respiratory therapy assess nares and determine need for appliance change or resting period.  12/21/2024 1114 by Jillian Light LPN  Outcome: Progressing  12/21/2024 0355 by Sosa Gonzales RN  Outcome: Progressing     Problem: Chronic Conditions and Co-morbidities  Goal: Patient's chronic conditions and co-morbidity symptoms are monitored and maintained or improved  12/21/2024 1114 by Jillian Light LPN  Flowsheets (Taken 12/21/2024 1114)  Care Plan - Patient's Chronic Conditions and Co-Morbidity Symptoms are Monitored and Maintained or Improved: Monitor and assess patient's chronic conditions and comorbid symptoms for stability, deterioration, or improvement  12/21/2024 0355 by Sosa Gonzales, RN  Outcome: Progressing  Flowsheets (Taken 12/20/2024 2130)  Care Plan - Patient's Chronic Conditions and Co-Morbidity Symptoms are Monitored and Maintained or Improved: Monitor and assess patient's chronic conditions and comorbid symptoms for stability, deterioration, or improvement     Problem: Nutrition Deficit:  Goal: Optimize nutritional status  Outcome: Progressing  Flowsheets (Taken 12/21/2024 1114)  Nutrient intake appropriate for improving, restoring, or maintaining nutritional needs:   Assess nutritional status and recommend course of action   Monitor oral intake, labs, and treatment plans

## 2024-12-21 NOTE — PLAN OF CARE
Problem: Discharge Planning  Goal: Discharge to home or other facility with appropriate resources  12/21/2024 0355 by Sosa Gonzales RN  Outcome: Progressing  Flowsheets (Taken 12/20/2024 2130)  Discharge to home or other facility with appropriate resources: Identify barriers to discharge with patient and caregiver     Problem: Safety - Adult  Goal: Free from fall injury  Outcome: Progressing     Problem: ABCDS Injury Assessment  Goal: Absence of physical injury  Outcome: Progressing     Problem: Pain  Goal: Verbalizes/displays adequate comfort level or baseline comfort level  Outcome: Progressing  Flowsheets (Taken 12/20/2024 2100)  Verbalizes/displays adequate comfort level or baseline comfort level: Encourage patient to monitor pain and request assistance     Problem: Skin/Tissue Integrity  Goal: Absence of new skin breakdown  Description: 1.  Monitor for areas of redness and/or skin breakdown  2.  Assess vascular access sites hourly  3.  Every 4-6 hours minimum:  Change oxygen saturation probe site  4.  Every 4-6 hours:  If on nasal continuous positive airway pressure, respiratory therapy assess nares and determine need for appliance change or resting period.  Outcome: Progressing

## 2024-12-21 NOTE — PROGRESS NOTES
Urology Progress Note    Reason for Consult:  retention  Requesting Physician:  medicine     History Obtained From:  patient, electronic medical record     Chief Complaint: urinary retention    Subjective: \"    Patient is resting in bed, voiding yellow urine via conklin, +flatus, +BM, ambulating with assistance, tolerating regular diet, denies any nausea or vomiting. There are complaints of controlled pain at this time.    Discharge with conklin    IMPRESSION/Plan:     Urinary Retention  -Multifactorial due to BPH, Constipation, Limited mobility, and pelvic fractures  -cont conklin. Patient needs cystoscopy to evaluate for bladder outlet obstruction.     BPH with Obstruction/LUTS  -Continue Flomax 0.8mg and Finasteride 5mg. Likely primary factor contributing to above. Patient will need outpatient cystoscopy with either  or [facility].   -Will d/c with conklin     Elevated PSA  -Last PSA 6.81 in   -PSA at time of last biopsy was 10.3 in , with Finasteride started in   - Elevated PSA likely due to enlarged prostate gland.      Constipation  - Secondary to limited mobility and narcotics.  - Continue Colace and Senna daily, prn Miralax. Suspect this is partially contributing to #1.     Multiple pelvic/pubic fractures with associated hemorrhage  -management per primary, likely contributing to #1.     Urology will follow peripherally, please call with questions  Case discussed with Dr. Noland        Vitals:  /83   Pulse 70   Temp 97.5 °F (36.4 °C) (Oral)   Resp 18   Ht 1.88 m (6' 2.02\")   Wt 97.2 kg (214 lb 4.6 oz)   SpO2 96%   BMI 27.50 kg/m²   Temp  Av.6 °F (36.4 °C)  Min: 97.5 °F (36.4 °C)  Max: 97.7 °F (36.5 °C)    Intake/Output Summary (Last 24 hours) at 2024 0929  Last data filed at 2024 0911  Gross per 24 hour   Intake 1160 ml   Output 4325 ml   Net -3165 ml       Social History     Socioeconomic History    Marital status:      Spouse name: Not on file    Number     Intimate Partner Violence: Not At Risk (12/11/2023)    Humiliation, Afraid, Rape, and Kick questionnaire     Fear of Current or Ex-Partner: No     Emotionally Abused: No     Physically Abused: No     Sexually Abused: No   Housing Stability: Low Risk  (12/10/2024)    Housing Stability Vital Sign     Unable to Pay for Housing in the Last Year: No     Number of Times Moved in the Last Year: 1     Homeless in the Last Year: No     Family History   Problem Relation Age of Onset    Heart Disease Mother     Liver Cancer Father     Diabetes Father     Breast Cancer Sister     Cancer Son         bladder    Breast Cancer Daughter      No Known Allergies      Constitutional: Alert and oriented times x3, no acute distress, and cooperative to examination with appropriate mood and affect.   HEENT:   Head:         Normocephalic and atraumatic.          Mucous membranes are normal.   Eyes:         EOM are normal.  Nose:    The external appearance of the nose is normal  Ears:     The ears appear normal to external inspection.   Cardiovascular:       Normal rate, regular rhythm.  Pulmonary/Chest:  Normal respiratory rate and rhthym. No use of accessory muscles.   Abdominal:          Soft. No tenderness. Active bowel sounds.  Genitalia:    Galeana catheter draining yellow urine  Neurological:    Alert and oriented.     Labs:  WBC:    Lab Results   Component Value Date/Time    WBC 9.2 12/17/2024 05:39 AM     Hemoglobin/Hematocrit:    Lab Results   Component Value Date/Time    HGB 10.3 12/17/2024 05:39 AM    HCT 31.8 12/17/2024 05:39 AM     BMP:    Lab Results   Component Value Date/Time     12/17/2024 05:39 AM    K 4.3 12/17/2024 05:39 AM    K 4.2 12/11/2024 06:43 AM     12/17/2024 05:39 AM    CO2 25 12/17/2024 05:39 AM    BUN 23 12/17/2024 05:39 AM    CREATININE 0.9 12/17/2024 05:39 AM    CALCIUM 8.9 12/17/2024 05:39 AM    GFRAA >60 05/06/2021 08:31 AM    LABGLOM 83 12/17/2024 05:39 AM    LABGLOM 83 10/06/2023

## 2024-12-22 PROCEDURE — 1180000000 HC REHAB R&B

## 2024-12-22 PROCEDURE — 6370000000 HC RX 637 (ALT 250 FOR IP): Performed by: NURSE PRACTITIONER

## 2024-12-22 PROCEDURE — 97530 THERAPEUTIC ACTIVITIES: CPT

## 2024-12-22 PROCEDURE — 6370000000 HC RX 637 (ALT 250 FOR IP): Performed by: STUDENT IN AN ORGANIZED HEALTH CARE EDUCATION/TRAINING PROGRAM

## 2024-12-22 PROCEDURE — 6370000000 HC RX 637 (ALT 250 FOR IP): Performed by: FAMILY MEDICINE

## 2024-12-22 PROCEDURE — 97535 SELF CARE MNGMENT TRAINING: CPT

## 2024-12-22 PROCEDURE — 99231 SBSQ HOSP IP/OBS SF/LOW 25: CPT | Performed by: UROLOGY

## 2024-12-22 PROCEDURE — 97116 GAIT TRAINING THERAPY: CPT

## 2024-12-22 PROCEDURE — 97110 THERAPEUTIC EXERCISES: CPT

## 2024-12-22 RX ADMIN — POLYETHYLENE GLYCOL 3350 17 G: 17 POWDER, FOR SOLUTION ORAL at 09:45

## 2024-12-22 RX ADMIN — Medication 5000 UNITS: at 09:39

## 2024-12-22 RX ADMIN — DOCUSATE SODIUM 200 MG: 100 CAPSULE, LIQUID FILLED ORAL at 09:39

## 2024-12-22 RX ADMIN — DOCUSATE SODIUM 200 MG: 100 CAPSULE, LIQUID FILLED ORAL at 21:01

## 2024-12-22 RX ADMIN — CALCIUM 500 MG: 500 TABLET ORAL at 18:17

## 2024-12-22 RX ADMIN — ACETAMINOPHEN 1000 MG: 500 TABLET ORAL at 21:01

## 2024-12-22 RX ADMIN — FINASTERIDE 5 MG: 5 TABLET, FILM COATED ORAL at 21:02

## 2024-12-22 RX ADMIN — CALCIUM 500 MG: 500 TABLET ORAL at 09:39

## 2024-12-22 RX ADMIN — TAMSULOSIN HYDROCHLORIDE 0.8 MG: 0.4 CAPSULE ORAL at 21:02

## 2024-12-22 RX ADMIN — SENNOSIDES 17.2 MG: 8.6 TABLET ORAL at 21:02

## 2024-12-22 RX ADMIN — ACETAMINOPHEN 1000 MG: 500 TABLET ORAL at 05:55

## 2024-12-22 RX ADMIN — APIXABAN 5 MG: 5 TABLET, FILM COATED ORAL at 09:39

## 2024-12-22 RX ADMIN — ATORVASTATIN CALCIUM 40 MG: 40 TABLET, FILM COATED ORAL at 21:01

## 2024-12-22 RX ADMIN — FAMOTIDINE 20 MG: 20 TABLET, FILM COATED ORAL at 09:39

## 2024-12-22 RX ADMIN — TRAMADOL HYDROCHLORIDE 100 MG: 50 TABLET, COATED ORAL at 21:01

## 2024-12-22 RX ADMIN — Medication 400 MG: at 21:01

## 2024-12-22 RX ADMIN — TRAMADOL HYDROCHLORIDE 100 MG: 50 TABLET, COATED ORAL at 09:39

## 2024-12-22 RX ADMIN — FAMOTIDINE 20 MG: 20 TABLET, FILM COATED ORAL at 21:01

## 2024-12-22 RX ADMIN — APIXABAN 5 MG: 5 TABLET, FILM COATED ORAL at 21:01

## 2024-12-22 RX ADMIN — Medication 400 MG: at 09:39

## 2024-12-22 RX ADMIN — ACETAMINOPHEN 1000 MG: 500 TABLET ORAL at 13:52

## 2024-12-22 ASSESSMENT — PAIN SCALES - GENERAL
PAINLEVEL_OUTOF10: 7
PAINLEVEL_OUTOF10: 9
PAINLEVEL_OUTOF10: 7
PAINLEVEL_OUTOF10: 9

## 2024-12-22 ASSESSMENT — PAIN - FUNCTIONAL ASSESSMENT: PAIN_FUNCTIONAL_ASSESSMENT: ACTIVITIES ARE NOT PREVENTED

## 2024-12-22 ASSESSMENT — PAIN DESCRIPTION - DESCRIPTORS
DESCRIPTORS: ACHING;DISCOMFORT
DESCRIPTORS: SHOOTING;SHARP;STABBING

## 2024-12-22 ASSESSMENT — PAIN DESCRIPTION - LOCATION
LOCATION: HIP
LOCATION: HIP
LOCATION: BUTTOCKS;HIP

## 2024-12-22 ASSESSMENT — PAIN DESCRIPTION - ORIENTATION
ORIENTATION: LEFT

## 2024-12-22 NOTE — PLAN OF CARE
Problem: Discharge Planning  Goal: Discharge to home or other facility with appropriate resources  12/22/2024 0014 by Sosa Gonzales, RN  Outcome: Progressing  Flowsheets (Taken 12/21/2024 2156)  Discharge to home or other facility with appropriate resources: Identify barriers to discharge with patient and caregiver     Problem: Safety - Adult  Goal: Free from fall injury  12/22/2024 0014 by Sosa Gonzales, RN  Outcome: Progressing     Problem: ABCDS Injury Assessment  Goal: Absence of physical injury  12/22/2024 0014 by Sosa Gonzales, RN  Outcome: Progressing     Problem: Pain  Goal: Verbalizes/displays adequate comfort level or baseline comfort level  12/22/2024 0014 by Sosa Gonzales, RN  Outcome: Progressing     Problem: Skin/Tissue Integrity  Goal: Absence of new skin breakdown  Description: 1.  Monitor for areas of redness and/or skin breakdown  2.  Assess vascular access sites hourly  3.  Every 4-6 hours minimum:  Change oxygen saturation probe site  4.  Every 4-6 hours:  If on nasal continuous positive airway pressure, respiratory therapy assess nares and determine need for appliance change or resting period.  12/22/2024 0014 by Sosa Gonzales, RN  Outcome: Progressing

## 2024-12-22 NOTE — PROGRESS NOTES
[facility]  INPATIENT PHYSICAL THERAPY  DAILY NOTE  [facility] - 8K-04/004-A      Discharge Recommendations: Continue to assess pending progress and Home with Home Health PT  Equipment Recommendations: No (Continue to assess pending progress (Patient has RW and cane may require manual wheelchair))               Time In: 905  Time Out: 935  Timed Code Treatment Minutes: 30 Minutes  Minutes: 30          Date: 2024  Patient Name: Gonzales Sheehan,  Gender:  male        MRN: [id number]  : 1940  (84 y.o.)     Referring Practitioner: Monserrat Hanson APRN - CNP (ordering); Priti Arreola DO (attending)  Diagnosis: Fall at home, initial encounter  Additional Pertinent Hx: Gonzales Sheehan is a 84 y.o. male admitted to [facility] on 2024. Patient  has a past medical history of AMD (age-related macular degeneration), wet (HCC), Bradycardia, Enlarged prostate, H/O cardiovascular stress test, H/O echocardiogram, History of Holter monitoring, Hyperlipidemia, Other and unspecified hyperlipidemia, PAF (paroxysmal atrial fibrillation) ([facility]), Psychosexual dysfunction with inhibited sexual excitement, and Unspecified hyperplasia of prostate without urinary obstruction and other lower urinary tract symptoms (LUTS).  Patient presented to [facility] from [facility] after suffering a fall downstairs.  Patient reported no loss of consciousness.  Patient reported getting up to use the restroom around 1:30 in the morning, he lost his balance, and fell down the stairway.  Denied head strike.  Wife was able to assist him and reportedly was moving him around the house his sheets.  Son arrived the following day and patient was unable to ambulate, he was taken to the ER for evaluation and found to have multiple pelvic fractures with associated hemorrhage and spinal fractures.  Patient on Eliquis for A-fib.  Patient's main complaint with left-sided pelvic pain  assist with getting into and out of bed.  Long Term Goal 2: Patient to perform sit<>stand transfers with Mod I in order to assist with home mobility.  Long Term Goal 3: Patient to ambulate >/=100 feet with use of RW and Supervision in order to assist with home mobility.  Long Term Goal 4: Patient to perform car transfer with Supervision in order to assist with getting to and from appointments.  Long Term Goal 5: Patient to ascend/descend 3 steps with B handrails and Supervision in order to assist with home entry.  Additional Goals?: Yes  Long term goal 6: PT to assess Tinetti/TUG when able    Following session, patient left in safe position with all fall risk precautions in place.

## 2024-12-22 NOTE — CARE COORDINATION
Pleasant and cooperative. Galeana catheter draining well. Scheduled Tylenol given @ 2055 and is effective for Lt hip pain per patient. Did offer Tramadol if Tylenol did not help as patient rated his pain a 9. We did apply ice to the Lt hip and patient is now resting quietly in bed with no discomfort noted.

## 2024-12-22 NOTE — PROGRESS NOTES
[facility]  Occupational Therapy  Daily Note    Discharge Recommendations: Home with Home Health OT  Equipment Recommendations: Yes Continue to assess need for shower chair and LHAE, handouts provided to patient. Patient has ETS, grab in shower, family to install grab bar near toilet      Time In: 0800  Time Out: 0830  Timed Code Treatment Minutes: 30 Minutes  Minutes: 30          Date: 2024  Patient Name: Gonzales Sheehan,   Gender: male      Room: [facility]-A  MRN: [id number]  : 1940  (84 y.o.)  Referring Practitioner: Monserrat Hanson APRN - CNP (ordering); Priti Arreola DO (attending)  Diagnosis: Fall at home, initial encounter  Additional Pertinent Hx: Gonzales Sheehan is a 84 y.o. male admitted to [facility] on 2024. Patient  has a past medical history of AMD (age-related macular degeneration), wet (HCC), Bradycardia, Enlarged prostate, H/O cardiovascular stress test, H/O echocardiogram, History of Holter monitoring, Hyperlipidemia, Other and unspecified hyperlipidemia, PAF (paroxysmal atrial fibrillation) ([facility]), Psychosexual dysfunction with inhibited sexual excitement, and Unspecified hyperplasia of prostate without urinary obstruction and other lower urinary tract symptoms (LUTS).  Patient presented to [facility] from [facility] after suffering a fall downstairs.  Patient reported no loss of consciousness.  Patient reported getting up to use the restroom around 1:30 in the morning, he lost his balance, and fell down the stairway.  Denied head strike.  Wife was able to assist him and reportedly was moving him around the house his sheets.  Son arrived the following day and patient was unable to ambulate, he was taken to the ER for evaluation and found to have multiple pelvic fractures with associated hemorrhage and spinal fractures.  Patient on Eliquis for A-fib.  Patient's main complaint with left-sided pelvic pain

## 2024-12-22 NOTE — PLAN OF CARE
Problem: Discharge Planning  Goal: Discharge to home or other facility with appropriate resources  12/22/2024 1123 by Tipton, Rylee, LPN  Outcome: Progressing  Flowsheets (Taken 12/22/2024 1120)  Discharge to home or other facility with appropriate resources:   Identify barriers to discharge with patient and caregiver   Identify discharge learning needs (meds, wound care, etc)     Problem: Safety - Adult  Goal: Free from fall injury  12/22/2024 1123 by Tipton, Rylee, LPN  Outcome: Progressing  Flowsheets (Taken 12/22/2024 1123)  Free From Fall Injury: Instruct family/caregiver on patient safety     Problem: ABCDS Injury Assessment  Goal: Absence of physical injury  12/22/2024 1123 by Tipton, Rylee, LPN  Outcome: Progressing  Flowsheets (Taken 12/21/2024 1114 by Jillian Light LPN)  Absence of Physical Injury: Implement safety measures based on patient assessment     Problem: Pain  Goal: Verbalizes/displays adequate comfort level or baseline comfort level  12/22/2024 1123 by Tipton, Rylee, LPN  Outcome: Progressing  Flowsheets (Taken 12/21/2024 1114 by Jillian Light LPN)  Verbalizes/displays adequate comfort level or baseline comfort level: Encourage patient to monitor pain and request assistance     Problem: Skin/Tissue Integrity  Goal: Absence of new skin breakdown  Description: 1.  Monitor for areas of redness and/or skin breakdown  2.  Assess vascular access sites hourly  3.  Every 4-6 hours minimum:  Change oxygen saturation probe site  4.  Every 4-6 hours:  If on nasal continuous positive airway pressure, respiratory therapy assess nares and determine need for appliance change or resting period.  12/22/2024 1123 by Tipton, Rylee, LPN  Outcome: Progressing     Problem: Chronic Conditions and Co-morbidities  Goal: Patient's chronic conditions and co-morbidity symptoms are monitored and maintained or improved  12/22/2024 1123 by Tipton, Rylee, LPN  Outcome: Progressing  Flowsheets (Taken 12/22/2024  1120)  Care Plan - Patient's Chronic Conditions and Co-Morbidity Symptoms are Monitored and Maintained or Improved:   Monitor and assess patient's chronic conditions and comorbid symptoms for stability, deterioration, or improvement   Collaborate with multidisciplinary team to address chronic and comorbid conditions and prevent exacerbation or deterioration     Problem: Nutrition Deficit:  Goal: Optimize nutritional status  Outcome: Progressing  Flowsheets (Taken 12/22/2024 1123)  Nutrient intake appropriate for improving, restoring, or maintaining nutritional needs: Monitor oral intake, labs, and treatment plans

## 2024-12-23 LAB
ANION GAP SERPL CALC-SCNC: 8 MEQ/L (ref 8–16)
BASOPHILS ABSOLUTE: 0.1 THOU/MM3 (ref 0–0.1)
BASOPHILS NFR BLD AUTO: 0.5 %
BUN SERPL-MCNC: 17 MG/DL (ref 7–22)
CALCIUM SERPL-MCNC: 9 MG/DL (ref 8.5–10.5)
CHLORIDE SERPL-SCNC: 101 MEQ/L (ref 98–111)
CO2 SERPL-SCNC: 26 MEQ/L (ref 23–33)
CREAT SERPL-MCNC: 0.8 MG/DL (ref 0.4–1.2)
DEPRECATED RDW RBC AUTO: 47.9 FL (ref 35–45)
EOSINOPHIL NFR BLD AUTO: 6.7 %
EOSINOPHILS ABSOLUTE: 0.7 THOU/MM3 (ref 0–0.4)
ERYTHROCYTE [DISTWIDTH] IN BLOOD BY AUTOMATED COUNT: 13.8 % (ref 11.5–14.5)
GFR SERPL CREATININE-BSD FRML MDRD: 86 ML/MIN/1.73M2
GLUCOSE SERPL-MCNC: 95 MG/DL (ref 70–108)
HCT VFR BLD AUTO: 34.5 % (ref 42–52)
HGB BLD-MCNC: 11.2 GM/DL (ref 14–18)
IMM GRANULOCYTES # BLD AUTO: 0.05 THOU/MM3 (ref 0–0.07)
IMM GRANULOCYTES NFR BLD AUTO: 0.5 %
LYMPHOCYTES ABSOLUTE: 1.5 THOU/MM3 (ref 1–4.8)
LYMPHOCYTES NFR BLD AUTO: 15.3 %
MCH RBC QN AUTO: 31.2 PG (ref 26–33)
MCHC RBC AUTO-ENTMCNC: 32.5 GM/DL (ref 32.2–35.5)
MCV RBC AUTO: 96.1 FL (ref 80–94)
MONOCYTES ABSOLUTE: 0.9 THOU/MM3 (ref 0.4–1.3)
MONOCYTES NFR BLD AUTO: 9.3 %
NEUTROPHILS ABSOLUTE: 6.8 THOU/MM3 (ref 1.8–7.7)
NEUTROPHILS NFR BLD AUTO: 67.7 %
NRBC BLD AUTO-RTO: 0 /100 WBC
PLATELET # BLD AUTO: 389 THOU/MM3 (ref 130–400)
PMV BLD AUTO: 8 FL (ref 9.4–12.4)
POTASSIUM SERPL-SCNC: 4.3 MEQ/L (ref 3.5–5.2)
RBC # BLD AUTO: 3.59 MILL/MM3 (ref 4.7–6.1)
SODIUM SERPL-SCNC: 135 MEQ/L (ref 135–145)
WBC # BLD AUTO: 10 THOU/MM3 (ref 4.8–10.8)

## 2024-12-23 PROCEDURE — 97116 GAIT TRAINING THERAPY: CPT

## 2024-12-23 PROCEDURE — 51702 INSERT TEMP BLADDER CATH: CPT

## 2024-12-23 PROCEDURE — 80048 BASIC METABOLIC PNL TOTAL CA: CPT

## 2024-12-23 PROCEDURE — 97530 THERAPEUTIC ACTIVITIES: CPT

## 2024-12-23 PROCEDURE — 85025 COMPLETE CBC W/AUTO DIFF WBC: CPT

## 2024-12-23 PROCEDURE — 1180000000 HC REHAB R&B

## 2024-12-23 PROCEDURE — 6370000000 HC RX 637 (ALT 250 FOR IP): Performed by: NURSE PRACTITIONER

## 2024-12-23 PROCEDURE — 6370000000 HC RX 637 (ALT 250 FOR IP): Performed by: STUDENT IN AN ORGANIZED HEALTH CARE EDUCATION/TRAINING PROGRAM

## 2024-12-23 PROCEDURE — 99231 SBSQ HOSP IP/OBS SF/LOW 25: CPT | Performed by: NURSE PRACTITIONER

## 2024-12-23 PROCEDURE — 6370000000 HC RX 637 (ALT 250 FOR IP): Performed by: FAMILY MEDICINE

## 2024-12-23 PROCEDURE — 36415 COLL VENOUS BLD VENIPUNCTURE: CPT

## 2024-12-23 PROCEDURE — 97535 SELF CARE MNGMENT TRAINING: CPT

## 2024-12-23 RX ORDER — SENNOSIDES A AND B 8.6 MG/1
2 TABLET, FILM COATED ORAL NIGHTLY
Qty: 60 TABLET | Refills: 0 | Status: SHIPPED | OUTPATIENT
Start: 2024-12-23 | End: 2025-01-22

## 2024-12-23 RX ORDER — LANOLIN ALCOHOL/MO/W.PET/CERES
400 CREAM (GRAM) TOPICAL 2 TIMES DAILY
Qty: 60 TABLET | Refills: 3 | Status: SHIPPED | OUTPATIENT
Start: 2024-12-23

## 2024-12-23 RX ORDER — POLYETHYLENE GLYCOL 3350 17 G/17G
17 POWDER, FOR SOLUTION ORAL DAILY PRN
COMMUNITY
Start: 2024-12-23 | End: 2025-01-22

## 2024-12-23 RX ORDER — CALCIUM CARBONATE 500(1250)
500 TABLET ORAL 2 TIMES DAILY WITH MEALS
Qty: 60 TABLET | Refills: 3 | Status: SHIPPED | OUTPATIENT
Start: 2024-12-23

## 2024-12-23 RX ORDER — PSEUDOEPHEDRINE HCL 30 MG
200 TABLET ORAL 2 TIMES DAILY
Qty: 60 CAPSULE | Refills: 3 | Status: SHIPPED | OUTPATIENT
Start: 2024-12-23

## 2024-12-23 RX ORDER — TRAMADOL HYDROCHLORIDE 50 MG/1
50-100 TABLET ORAL EVERY 8 HOURS PRN
Qty: 42 TABLET | Refills: 0 | Status: SHIPPED | OUTPATIENT
Start: 2024-12-23 | End: 2024-12-30

## 2024-12-23 RX ORDER — TAMSULOSIN HYDROCHLORIDE 0.4 MG/1
0.8 CAPSULE ORAL DAILY
Qty: 30 CAPSULE | Refills: 3
Start: 2024-12-23

## 2024-12-23 RX ORDER — VITAMIN B COMPLEX
5000 TABLET ORAL DAILY
Qty: 150 TABLET | Refills: 3 | Status: SHIPPED | OUTPATIENT
Start: 2024-12-24

## 2024-12-23 RX ORDER — CYCLOBENZAPRINE HCL 10 MG
10 TABLET ORAL 3 TIMES DAILY PRN
Qty: 21 TABLET | Refills: 0 | Status: SHIPPED | OUTPATIENT
Start: 2024-12-23 | End: 2025-01-02

## 2024-12-23 RX ORDER — FAMOTIDINE 20 MG/1
20 TABLET, FILM COATED ORAL 2 TIMES DAILY
Qty: 60 TABLET | Refills: 3 | Status: SHIPPED | OUTPATIENT
Start: 2024-12-23

## 2024-12-23 RX ADMIN — Medication 5000 UNITS: at 08:29

## 2024-12-23 RX ADMIN — ACETAMINOPHEN 1000 MG: 500 TABLET ORAL at 12:39

## 2024-12-23 RX ADMIN — CALCIUM 500 MG: 500 TABLET ORAL at 08:29

## 2024-12-23 RX ADMIN — APIXABAN 5 MG: 5 TABLET, FILM COATED ORAL at 08:29

## 2024-12-23 RX ADMIN — TAMSULOSIN HYDROCHLORIDE 0.8 MG: 0.4 CAPSULE ORAL at 19:50

## 2024-12-23 RX ADMIN — CALCIUM 500 MG: 500 TABLET ORAL at 17:21

## 2024-12-23 RX ADMIN — Medication 400 MG: at 19:50

## 2024-12-23 RX ADMIN — ACETAMINOPHEN 1000 MG: 500 TABLET ORAL at 23:48

## 2024-12-23 RX ADMIN — ATORVASTATIN CALCIUM 40 MG: 40 TABLET, FILM COATED ORAL at 19:50

## 2024-12-23 RX ADMIN — APIXABAN 5 MG: 5 TABLET, FILM COATED ORAL at 19:50

## 2024-12-23 RX ADMIN — FINASTERIDE 5 MG: 5 TABLET, FILM COATED ORAL at 19:50

## 2024-12-23 RX ADMIN — TRAMADOL HYDROCHLORIDE 100 MG: 50 TABLET, COATED ORAL at 12:38

## 2024-12-23 RX ADMIN — ACETAMINOPHEN 1000 MG: 500 TABLET ORAL at 05:38

## 2024-12-23 RX ADMIN — Medication 400 MG: at 08:29

## 2024-12-23 RX ADMIN — FAMOTIDINE 20 MG: 20 TABLET, FILM COATED ORAL at 08:29

## 2024-12-23 RX ADMIN — FAMOTIDINE 20 MG: 20 TABLET, FILM COATED ORAL at 19:53

## 2024-12-23 RX ADMIN — DOCUSATE SODIUM 200 MG: 100 CAPSULE, LIQUID FILLED ORAL at 19:53

## 2024-12-23 RX ADMIN — SENNOSIDES 17.2 MG: 8.6 TABLET ORAL at 19:53

## 2024-12-23 RX ADMIN — DOCUSATE SODIUM 200 MG: 100 CAPSULE, LIQUID FILLED ORAL at 08:29

## 2024-12-23 ASSESSMENT — PAIN DESCRIPTION - LOCATION
LOCATION: HIP
LOCATION: BUTTOCKS
LOCATION: HIP

## 2024-12-23 ASSESSMENT — PAIN SCALES - GENERAL
PAINLEVEL_OUTOF10: 5
PAINLEVEL_OUTOF10: 10
PAINLEVEL_OUTOF10: 8
PAINLEVEL_OUTOF10: 8

## 2024-12-23 ASSESSMENT — PAIN DESCRIPTION - ORIENTATION
ORIENTATION: LEFT
ORIENTATION: LEFT
ORIENTATION: RIGHT

## 2024-12-23 ASSESSMENT — PAIN DESCRIPTION - DESCRIPTORS
DESCRIPTORS: SHARP;STABBING
DESCRIPTORS: ACHING;DISCOMFORT

## 2024-12-23 NOTE — PROGRESS NOTES
Patient: Gonzales Sheehan  Unit/Bed: 8K-04/004-A  YOB: 1940  MRN: [id number] Acct: [id number]   Admitting Diagnosis: Fall at home, initial encounter [W19.XXXA, Y92.009]  Admit Date:  12/10/2024  Hospital Day: 13    Assessment:     Principal Problem:    Fall at home, initial encounter  Resolved Problems:    * No resolved hospital problems. *      Plan:     Continue to follow        Subjective:     Patient has no complaint of CP or SOB..   Medication side effects: none    Scheduled Meds:   docusate sodium  200 mg Oral BID    tamsulosin  0.8 mg Oral Daily    magnesium oxide  400 mg Oral BID    acetaminophen  1,000 mg Oral 3 times per day    Vitamin D  5,000 Units Oral Daily    finasteride  5 mg Oral Daily    atorvastatin  40 mg Oral Nightly    apixaban  5 mg Oral BID    senna  2 tablet Oral Nightly    famotidine  20 mg Oral BID    calcium elemental  500 mg Oral BID WC     Continuous Infusions:   sodium chloride       PRN Meds:polyethylene glycol, calcium carbonate, traMADol **OR** traMADol, ondansetron, cyclobenzaprine, sodium chloride flush, potassium chloride **OR** potassium chloride, magnesium sulfate, bisacodyl, sodium chloride, Menthol    Review of Systems  Pertinent items are noted in HPI.    Objective:     Patient Vitals for the past 8 hrs:   Resp   12/23/24 1308 18   12/23/24 1238 18     I/O last 3 completed shifts:  In: 700 [P.O.:700]  Out: 7175 [Urine:7175]  No intake/output data recorded.    BP (!) 169/82   Pulse 80   Temp 97.5 °F (36.4 °C) (Oral)   Resp 18   Ht 1.88 m (6' 2.02\")   Wt 95.2 kg (209 lb 14.1 oz)   SpO2 98%   BMI 26.94 kg/m²     General appearance: alert, appears stated age, and cooperative  Head: Normocephalic, without obvious abnormality, atraumatic  Lungs: clear to auscultation bilaterally  Chest wall: no tenderness  Heart: regular rate and rhythm, S1, S2 normal, no murmur, click, rub or gallop  Abdomen: soft, non-tender; bowel sounds normal; no masses,  no  organomegaly  Extremities: extremities normal, atraumatic, no cyanosis or edema  Skin: Skin color, texture, turgor normal. No rashes or lesions  Neurologic:  weak    Electronically signed by Chinedu Rendon MD on 12/23/2024 at 5:58 PM

## 2024-12-23 NOTE — PLAN OF CARE
Problem: Discharge Planning  Goal: Discharge to home or other facility with appropriate resources  Outcome: Progressing  Flowsheets (Taken 12/23/2024 0815)  Discharge to home or other facility with appropriate resources: Identify barriers to discharge with patient and caregiver     Problem: Safety - Adult  Goal: Free from fall injury  Outcome: Progressing  Flowsheets (Taken 12/23/2024 1334)  Free From Fall Injury: Instruct family/caregiver on patient safety     Problem: ABCDS Injury Assessment  Goal: Absence of physical injury  Outcome: Progressing  Flowsheets (Taken 12/23/2024 1334)  Absence of Physical Injury: Implement safety measures based on patient assessment     Problem: Pain  Goal: Verbalizes/displays adequate comfort level or baseline comfort level  Outcome: Progressing  Flowsheets (Taken 12/23/2024 0815)  Verbalizes/displays adequate comfort level or baseline comfort level:   Encourage patient to monitor pain and request assistance   Assess pain using appropriate pain scale     Problem: Skin/Tissue Integrity  Goal: Absence of new skin breakdown  Description: 1.  Monitor for areas of redness and/or skin breakdown  2.  Assess vascular access sites hourly  3.  Every 4-6 hours minimum:  Change oxygen saturation probe site  4.  Every 4-6 hours:  If on nasal continuous positive airway pressure, respiratory therapy assess nares and determine need for appliance change or resting period.  Outcome: Progressing     Problem: Chronic Conditions and Co-morbidities  Goal: Patient's chronic conditions and co-morbidity symptoms are monitored and maintained or improved  Outcome: Progressing  Flowsheets (Taken 12/23/2024 0815)  Care Plan - Patient's Chronic Conditions and Co-Morbidity Symptoms are Monitored and Maintained or Improved: Monitor and assess patient's chronic conditions and comorbid symptoms for stability, deterioration, or improvement     Problem: Nutrition Deficit:  Goal: Optimize nutritional status  Outcome:  Progressing  Flowsheets (Taken 12/23/2024 4965)  Nutrient intake appropriate for improving, restoring, or maintaining nutritional needs:   Assess nutritional status and recommend course of action   Monitor oral intake, labs, and treatment plans

## 2024-12-23 NOTE — DISCHARGE SUMMARY
Physical Medicine & Rehabilitation   Discharge Summary     Patient Identification:  Gonzales Sheehan  : 1940  Admit date: 12/10/2024  Discharge date: 24   Attending provider: Priti Arreola DO        Primary care provider: Darrian Edwards APRN - CNP     Discharge Diagnoses:   Trauma by fall  Multiple pelvic/ pubic fractures with associated hemorrhage; left sacral ala, comminuted and mildly displaced. Pubic symphyseal diastasis  Pelvic hemorrhage and hematoma, complicated by anticoagulant   Lumbar and Sacral spine fractures; Nondisplaced left L5 and L4 transverse process fractures. Fracture of the superior S5 vertebral body, nondisplaced.  Elevated CK   Bradycardia  enlarged prostate  A-fib  HLD    Discharge Functional Status:    Occupational Therapy:  EATING:Independent      ORAL HYGIENE:Supervision or touching assistance Pt requires supervision to complete oral care standing at sink. Pt able to toelrate > 5 min with 1-2 UE release from RW.    TOILETING HYGIENE:Supervision or touching assistance Pt requires supervision for standing balance during clothing mgmt and bottom hygiene. Pt dependent to urinate in conklin.    SHOWERING/BATHING:Supervision or touching assistance Pt requires supervison for bathing tasks while seated on TTB. Pt utilizes  sponge for BLE and completes stand with 1 UE release from grab bar for bottom hygiene.    UPPER BODY DRESSING:Independent  To doff/don pullover shirt.    LOWER BODY DRESSING:Supervision or touching assistance  Pt requires supervision. Education provided to Pt and Pt's wife to thread conklin with good understanding and carryover noted. Pt utilizes reacher to thread  and doff BLE through depends and pants. Pt compeltes hip mgmt with 1-2 UE release from RW for hip mgmt.    FOOTWEAR:Partial/moderate assistance  Pt able to don B socks with use of sock aid, doff socks with reacher and don shoes with LH shoe horn. Pt requires A to tie B shoelaces.    TOILET

## 2024-12-23 NOTE — PLAN OF CARE
Problem: Discharge Planning  Goal: Discharge to home or other facility with appropriate resources  12/23/2024 1542 by Freya Ortega LISW-S  Note: SW received a message from patient's wife and patient regarding discharge plans over the weekend. Elena had Medicare questions, while patient was interested in a transition to the [facility] in [place].    SW met with patient to discuss discharge plans. Patient asked SW to check into the [facility] [place].    SW spoke with Felicia at the [facility] and referral was made. Felicia reported that they could accept patient for admission on 12/24.    SW met with patient and family on this date. SW answered their questions regarding Medicare benefits, services, DME, etc. Patient reports wanting to discharge to home with home health services. Patient had no outstanding needs.    SW made a referral to [facility] in [place]. They reported they could not accept patient for admission to their services.    SW made a referral to Cali at [facility].    SW will follow and maintain involvement in discharge planning.    Transportation:   Has transportation kept you from medical appointments, meetings, work, or from getting things needed for daily living? (Check all that apply)  No.      Health Literacy:   How often do you need to have someone help you when you read instructions, pamphlets, or other written material from your doctor or pharmacy?  0. - Never    Social Isolation:  How often do you feel lonely or isolated from those around you?  0. Never      Patient Mood Interview (PHQ-2 to 9) (from [employer]©)   Say to Patient: \"Over the last 2 weeks, have you been bothered by any of the following problems?\"   If symptom is present, enter 1 (yes) in column 1 (Symptom Presence)  If yes in column 1, then ask the patient: “About how often have you been bothered by this?”  Read and show the patient a card with the symptom frequency choices.    Indicate response in column 2,

## 2024-12-23 NOTE — PROGRESS NOTES
[facility]  Recreational Therapy  Discharge Note  Inpatient Rehabilitation Unit         Date:  12/23/2024       Patient Name: Gonzales Sheehan      MRN: [id number]       YOB: 1940 (84 y.o.)       Gender: male  Diagnosis: Fall at home, initial encounter  Referring Practitioner: Monserrat Hanson APRN - CNP (ordering); Priti Arreola DO (attending)    Patient discharged from Recreational Therapy at this time.  See recreational therapy notes for details.    Electronically signed by: ELAINE Coombs  Date: 12/23/2024

## 2024-12-23 NOTE — PROGRESS NOTES
[facility]  Occupational Therapy  Daily Note    Discharge Recommendations: Home with Home Health OT  Equipment Recommendations: Yes Continue to assess need for shower chair and LHAE, handouts provided to patient. Patient has ETS, grab in shower, family to install grab bar near toilet    Time In: 1100  Time Out: 1230  Timed Code Treatment Minutes: 90 Minutes  Minutes: 90          Date: 2024  Patient Name: Gonzales Sheehan,   Gender: male      Room: [facility]004-  MRN: [id number]  : 1940  (84 y.o.)  Referring Practitioner: Monserrat Hanson APRN - CNP (ordering); Priti Arreola DO (attending)  Diagnosis: Fall at home, initial encounter  Additional Pertinent Hx: Gonzales Sheehan is a 84 y.o. male admitted to [facility] on 2024. Patient  has a past medical history of AMD (age-related macular degeneration), wet (HCC), Bradycardia, Enlarged prostate, H/O cardiovascular stress test, H/O echocardiogram, History of Holter monitoring, Hyperlipidemia, Other and unspecified hyperlipidemia, PAF (paroxysmal atrial fibrillation) ([facility]), Psychosexual dysfunction with inhibited sexual excitement, and Unspecified hyperplasia of prostate without urinary obstruction and other lower urinary tract symptoms (LUTS).  [facility] after suffering a fall downstairs.  Patient reported no loss of consciousness.  Patient reported getting up to use the restroom around 1:30 in the morning, he lost his balance, and fell down the stairway.  Denied head strike.  Wife was able to assist him and reportedly was moving him around the house his sheets.  Son arrived the following day and patient was unable to ambulate, he was taken to the ER for evaluation and found to have multiple pelvic fractures with associated hemorrhage and spinal fractures.  Patient on Eliquis for A-fib.  Patient's main complaint with left-sided pelvic pain  Pt utilizes reacher to thread  and doff BLE through depends and pants. Pt compeltes hip mgmt with 1-2 UE release from RW for hip mgmt..  CARE Score: 4.       FOOTWEAR:Partial/moderate assistance  Pt able to don B socks with use of sock aid, doff socks with reacher and don shoes with LH shoe horn. Pt requires A to tie B shoelaces..  CARE Score: 3.       TOILET TRANSFER: Supervision or touching assistance.  <> raised toilet seat.. CARE Score: 4.        Shower Transfer: Supervision.  <> walk in shower .    Modified Barthel Index administered this date with pt scoring 79/100 indicating Moderate Dependence (61-90) with daily task completion.     IADL:   Not Tested    BALANCE:  Sitting Balance:  Modified Independent.    Standing Balance: Supervision.      BED MOBILITY:  Not Tested    TRANSFERS:  Sit to Stand:  Supervision, with increased time for completion.    Stand to Sit: Supervision, with increased time for completion.      FUNCTIONAL MOBILITY:  Assistive Device: Rolling Walker  Assist Level:  Supervision.   Distance: To and from bathroom and short distances within hallways      ADDITIONAL ACTIVITIES:  Pt and family educated on AE, precautions and wife educated on threading conklin catheter through pant leg. Pt and family demo'ed good understanding with education provided on AE. Pt's family states no further questions at this time.       Modified Leandra Scale:  Not Applicable    ASSESSMENT:     Activity Tolerance:  Patient tolerance of  treatment: Good treatment tolerance      Plan: Times Per Week: 5x/wk for 90min  Times Per Day: Once a day  Current Treatment Recommendations: Strengthening, Balance training, Endurance training, Patient/Caregiver education & training, Self-Care / ADL, Home management training, Equipment evaluation, education, & procurement, Safety education & training, Functional mobility training    Education:  Learners: Patient  Role of OT, Plan of Care, ADL's, Precautions, Family Education, Equipment

## 2024-12-23 NOTE — PROGRESS NOTES
Comprehensive Nutrition Assessment    Type and Reason for Visit:  Reassess    Nutrition Recommendations/Plan:   Recommend diet as tolerated.  Continue Ensure Plus BID.  Recommend MVI.  Encouraged po, ONS at best efforts.  Discussed appropriate use of ONS at home.     Malnutrition Assessment:  Malnutrition Status:  At risk for malnutrition (12/12/24 1345)    Context:  Acute Illness     Findings of the 6 clinical characteristics of malnutrition:  Energy Intake:  Mild decrease in energy intake  Weight Loss:  No weight loss     Body Fat Loss:  No body fat loss     Muscle Mass Loss:  No muscle mass loss    Fluid Accumulation:  Unable to assess     Strength:  Not Performed    Nutrition Assessment:     Pt. nutritionally compromised AEB wounds. At risk for further nutrition compromise r/t increased nutrient needs for wound healing, admit s/p fall, fractures; advanced age and underlying medical condition (hx HLD).     Nutrition Related Findings:    Pt. Report/Treatments/Miscellaneous:   12/23 - pt. Seen - reports appetite is ok; denies any trouble tolerating diet; states drinking the Ensures; discussed appropriate home use; voices frustrations with quality of meals, etc  12/18-pt. Seen - reports appetite is getting better; states appetite had decreased appetite d/t pain but pain is now a little better; states acceptance of Ensures; states likes milk and is consuming; denies any trouble tolerating diet  12/12-pt. Seen - reports good appetite but states not eating well d/t dislikes hospital food; denies any trouble tolerating diet; reports good appetite and intake pta (consumes 2-3 meals/day); agrees to trial Ensure Plus BID; wife seems receptive to bringing in food from outside   GI Status: BM 12/22  Pertinent Labs: 12/23: Glucose 95, BUN 17, Cr 0.8  Pertinent Meds: Vitamin D, Colace, Glycolax, Senokot      Wound Type:  (PI - buttocks)       Current Nutrition Intake & Therapies:    Average Meal Intake: 51-75%,  %  Average Supplements Intake:  (states acceptance)  ADULT DIET; Regular  ADULT ORAL NUTRITION SUPPLEMENT; Breakfast, Dinner; Standard High Calorie/High Protein Oral Supplement    Anthropometric Measures:  Height: 188 cm (6' 2.02\")  Ideal Body Weight (IBW): 190 lbs (86 kg)    Admission Body Weight: 92 kg (202 lb 13.2 oz) (12/10 no edema)  Current Body Weight: 95.2 kg (209 lb 14.1 oz) (12/23 trace edema),      Current BMI (kg/m2): 26.9  Usual Body Weight:  (per pt. 208#; per EMR: 11/17/23: 203# 11 oz, 12/10/24: 202# 13 oz)                          BMI Categories: Overweight (BMI 25.0-29.9)    Estimated Daily Nutrient Needs:  Energy Requirements Based On: Kcal/kg  Weight Used for Energy Requirements: Other (98)  Energy (kcal/day): 5715-2398 kcals (20-25)  Weight Used for Protein Requirements: Ideal (86)  Protein (g/day): 112+ grams (1.3+)       Nutrition Diagnosis:   Increased nutrient needs related to increase demand for energy/nutrients as evidenced by wounds    Nutrition Interventions:   Food and/or Nutrient Delivery: Continue Current Diet, Continue Oral Nutrition Supplement  Nutrition Education/Counseling: Education/Counseling initiated  Coordination of Nutrition Care: Continue to monitor while inpatient       Goals:  Goals: PO intake 75% or greater, by next RD assessment  Type of Goal: Continue current goal  Previous Goal Met: Progressing toward Goal(s)    Nutrition Monitoring and Evaluation:      Food/Nutrient Intake Outcomes: Diet Advancement/Tolerance, Food and Nutrient Intake, Supplement Intake  Physical Signs/Symptoms Outcomes: Biochemical Data, Chewing or Swallowing, GI Status, Fluid Status or Edema, Nutrition Focused Physical Findings, Skin, Weight    Discharge Planning:    Too soon to determine     Lala Pascual, RD, LD  Contact: [phone]

## 2024-12-23 NOTE — PROGRESS NOTES
[facility]  INPATIENT PHYSICAL THERAPY  DAILY NOTE  [facility] - [id number]      Discharge Recommendations: Continue to assess pending progress and Patient would benefit from continued PT at discharge  Equipment Recommendations: No (Continue to assess pending progress (Patient has RW and cane may require manual wheelchair))               Time In: 926  Time Out: 1056  Timed Code Treatment Minutes: 90 Minutes  Minutes: 90          Date: 2024  Patient Name: Gonzales Sheehan,  Gender:  male        MRN: [id number]  : 1940  (84 y.o.)     Referring Practitioner: Monserrat Hanson APRN - CNP (ordering); Priti Arreola DO (attending)  Diagnosis: Fall at home, initial encounter  Additional Pertinent Hx: Gonzales Sheehan is a 84 y.o. male admitted to [facility] on 2024. Patient  has a past medical history of AMD (age-related macular degeneration), wet (HCC), Bradycardia, Enlarged prostate, H/O cardiovascular stress test, H/O echocardiogram, History of Holter monitoring, Hyperlipidemia, Other and unspecified hyperlipidemia, PAF (paroxysmal atrial fibrillation) ([facility]), Psychosexual dysfunction with inhibited sexual excitement, and Unspecified hyperplasia of prostate without urinary obstruction and other lower urinary tract symptoms (LUTS).  Patient presented to [facility] from [facility] after suffering a fall downstairs.  Patient reported no loss of consciousness.  Patient reported getting up to use the restroom around 1:30 in the morning, he lost his balance, and fell down the stairway.  Denied head strike.  Wife was able to assist him and reportedly was moving him around the house his sheets.  Son arrived the following day and patient was unable to ambulate, he was taken to the ER for evaluation and found to have multiple pelvic fractures with associated hemorrhage and spinal fractures.  Patient on Eliquis for A-fib.  Patient's main complaint

## 2024-12-23 NOTE — PROGRESS NOTES
Physical Medicine & Rehabilitation   Progress Note    Chief Complaint:  Pelvic/sacral fractures     Subjective:  Patient seen today, sitting up in wheelchair.  Galeana in place.  Family at bedside.  Family underwent family training today.  Wife and patient asking about discharge medications, patient reports [facility]  Patient reports he has Eliquis, Flomax, his cholesterol medication, and his other prostate medication already available at home.  Patient was wanting to follow-up with [place] [facility], discussed timelines for follow-up and needing to get in sooner than later due to his Galeana.  Patient and family understanding, they are open to [facility] urology if unable to get into [place] in a timely manner.  Patient and family deny any other questions or concerns about plan for today or discharge for tomorrow.      Rehabilitation:   PT   Transfers:  Sit to Stand: Stand By Assistance, Contact Guard Assistance  Stand to Sit:Stand By Assistance, Contact Guard Assistance  Ambulation:  Stand By Assistance  Distance: 10'x1  Surface: Level Tile  Device: Rolling Walker  Gait Deviations: Slow Mari, Decreased Step Length Bilaterally, Decreased Weight Shift Left, Decreased Gait Speed, Decreased Heel Strike Bilaterally, Decreased Terminal Knee Extension, and Increased reliance on assistive device   *Pt demo antalgic gait with decreased wt shift to L LE   Stairs:  Not Tested  Balance:  Static Sitting Balance:  Modified Independent  Static Standing Balance: Stand By Assistance  Dynamic Standing Balance: Contact Guard Assistance  *pt required use of restroom during session with continence of small BM. Pt required CGAx1 from toileted surface.  Exercise:  Patient was guided in 1 set(s) 10 reps of exercises to both lower extremities: Glut sets, Seated marches, Seated hamstring curls, Seated heel/toe raises, Long arc quads, Seated isometric hip adduction, Seated  and recommendations.    Bladder: Will monitor for signs and symptoms of infection/retention  Bowel: Continue Docusate 200 mg BID and senna nightly. PRN suppository and miralax  Labs: none. Routine labs stable   and case management consultations for coordination of care and discharge planning. Anticipated DC home on 12/24/2024 with  services    Missed Therapy Time:  None    Monserrat Hanson, ROSIE - CNP

## 2024-12-23 NOTE — PROGRESS NOTES
Pt has worked well with therapy today.  He did complain of pain and PRN pain medication given with good effect.  All his discharge appointments have been made.  Wife has been with him thorough out the day.  He did have some questions about his conklin cath that was answered.  He is comfortable going home with it.  He stated that he has had one at home before.

## 2024-12-24 VITALS
HEIGHT: 74 IN | HEART RATE: 74 BPM | BODY MASS INDEX: 25.94 KG/M2 | OXYGEN SATURATION: 96 % | SYSTOLIC BLOOD PRESSURE: 142 MMHG | DIASTOLIC BLOOD PRESSURE: 73 MMHG | WEIGHT: 202.1 LBS | RESPIRATION RATE: 14 BRPM | TEMPERATURE: 97.5 F

## 2024-12-24 LAB
BASOPHILS ABSOLUTE: 0 THOU/MM3 (ref 0–0.1)
BASOPHILS NFR BLD AUTO: 0.4 %
DEPRECATED RDW RBC AUTO: 49.2 FL (ref 35–45)
EOSINOPHIL NFR BLD AUTO: 5.2 %
EOSINOPHILS ABSOLUTE: 0.5 THOU/MM3 (ref 0–0.4)
ERYTHROCYTE [DISTWIDTH] IN BLOOD BY AUTOMATED COUNT: 14.1 % (ref 11.5–14.5)
HCT VFR BLD AUTO: 36.6 % (ref 42–52)
HGB BLD-MCNC: 11.9 GM/DL (ref 14–18)
IMM GRANULOCYTES # BLD AUTO: 0.04 THOU/MM3 (ref 0–0.07)
IMM GRANULOCYTES NFR BLD AUTO: 0.4 %
LYMPHOCYTES ABSOLUTE: 1.2 THOU/MM3 (ref 1–4.8)
LYMPHOCYTES NFR BLD AUTO: 12.1 %
MCH RBC QN AUTO: 31.2 PG (ref 26–33)
MCHC RBC AUTO-ENTMCNC: 32.5 GM/DL (ref 32.2–35.5)
MCV RBC AUTO: 96.1 FL (ref 80–94)
MONOCYTES ABSOLUTE: 0.7 THOU/MM3 (ref 0.4–1.3)
MONOCYTES NFR BLD AUTO: 6.5 %
NEUTROPHILS ABSOLUTE: 7.8 THOU/MM3 (ref 1.8–7.7)
NEUTROPHILS NFR BLD AUTO: 75.4 %
NRBC BLD AUTO-RTO: 0 /100 WBC
PLATELET # BLD AUTO: 429 THOU/MM3 (ref 130–400)
PMV BLD AUTO: 8 FL (ref 9.4–12.4)
RBC # BLD AUTO: 3.81 MILL/MM3 (ref 4.7–6.1)
WBC # BLD AUTO: 10.3 THOU/MM3 (ref 4.8–10.8)

## 2024-12-24 PROCEDURE — 97530 THERAPEUTIC ACTIVITIES: CPT

## 2024-12-24 PROCEDURE — 6370000000 HC RX 637 (ALT 250 FOR IP): Performed by: NURSE PRACTITIONER

## 2024-12-24 PROCEDURE — 6370000000 HC RX 637 (ALT 250 FOR IP): Performed by: FAMILY MEDICINE

## 2024-12-24 PROCEDURE — 85025 COMPLETE CBC W/AUTO DIFF WBC: CPT

## 2024-12-24 PROCEDURE — 97535 SELF CARE MNGMENT TRAINING: CPT

## 2024-12-24 PROCEDURE — 6370000000 HC RX 637 (ALT 250 FOR IP): Performed by: STUDENT IN AN ORGANIZED HEALTH CARE EDUCATION/TRAINING PROGRAM

## 2024-12-24 PROCEDURE — 36415 COLL VENOUS BLD VENIPUNCTURE: CPT

## 2024-12-24 PROCEDURE — 99239 HOSP IP/OBS DSCHRG MGMT >30: CPT | Performed by: NURSE PRACTITIONER

## 2024-12-24 PROCEDURE — 99231 SBSQ HOSP IP/OBS SF/LOW 25: CPT | Performed by: NURSE PRACTITIONER

## 2024-12-24 RX ORDER — CEPHALEXIN 500 MG/1
500 CAPSULE ORAL 4 TIMES DAILY
Qty: 28 CAPSULE | Refills: 0 | Status: SHIPPED | OUTPATIENT
Start: 2024-12-24 | End: 2024-12-31

## 2024-12-24 RX ADMIN — Medication 400 MG: at 10:04

## 2024-12-24 RX ADMIN — APIXABAN 5 MG: 5 TABLET, FILM COATED ORAL at 10:04

## 2024-12-24 RX ADMIN — DOCUSATE SODIUM 200 MG: 100 CAPSULE, LIQUID FILLED ORAL at 10:04

## 2024-12-24 RX ADMIN — Medication 5000 UNITS: at 10:03

## 2024-12-24 RX ADMIN — FAMOTIDINE 20 MG: 20 TABLET, FILM COATED ORAL at 10:04

## 2024-12-24 RX ADMIN — CALCIUM 500 MG: 500 TABLET ORAL at 10:04

## 2024-12-24 RX ADMIN — ACETAMINOPHEN 1000 MG: 500 TABLET ORAL at 04:54

## 2024-12-24 RX ADMIN — ACETAMINOPHEN 1000 MG: 500 TABLET ORAL at 12:34

## 2024-12-24 ASSESSMENT — PAIN DESCRIPTION - LOCATION
LOCATION: BUTTOCKS
LOCATION: HIP

## 2024-12-24 ASSESSMENT — PAIN - FUNCTIONAL ASSESSMENT: PAIN_FUNCTIONAL_ASSESSMENT: ACTIVITIES ARE NOT PREVENTED

## 2024-12-24 ASSESSMENT — PAIN DESCRIPTION - DESCRIPTORS
DESCRIPTORS: STABBING
DESCRIPTORS: ACHING;DISCOMFORT

## 2024-12-24 ASSESSMENT — PAIN SCALES - GENERAL
PAINLEVEL_OUTOF10: 9
PAINLEVEL_OUTOF10: 0
PAINLEVEL_OUTOF10: 9

## 2024-12-24 ASSESSMENT — PAIN DESCRIPTION - ORIENTATION
ORIENTATION: LEFT
ORIENTATION: LEFT

## 2024-12-24 NOTE — PROGRESS NOTES
Bladder irrigated using sterile technique with 180 ml sterile water, 150 ml irrigant immediately drained with another 50 mls draining into tubing after catheter re-conncted. Conklin care completed, demonstrated for patient and wife. Talked through process for home catheter care. Wife and patient voice understanding. Table given for wife to document conklin output, urine description, catheter care and any comments. Extra graduate cylinders and stat locks included in patient belongings.

## 2024-12-24 NOTE — DISCHARGE INSTR - COC
Hyperlipidemia E78.5    Frequent PVCs I49.3    Paroxysmal atrial fibrillation (HCC) I48.0    Severe sinus bradycardia R00.1    Closed fracture of sacrum, initial encounter ([facility]) S32.10XA    Multiple closed pelvic fractures with disruption of pelvic [place], sequela S32.810S    Pelvic hematoma, male N50.1    Closed fracture of left side of symphysis pubis with diastasis with routine healing S32.592D    Lumbar transverse process fracture, closed, initial encounter ([facility]) S32.009A    Fall at home, initial encounter W19.XXXA, Y92.009       Isolation/Infection:   Isolation            No Isolation          Patient Infection Status       None to display            Nurse Assessment:  Last Vital Signs: BP (!) 142/73   Pulse 74   Temp 97.5 °F (36.4 °C) (Oral)   Resp 14   Ht 1.88 m (6' 2.02\")   Wt 91.7 kg (202 lb 1.6 oz)   SpO2 96%   BMI 25.94 kg/m²     Last documented pain score (0-10 scale): Pain Level: 0  Last Weight:   Wt Readings from Last 1 Encounters:   12/24/24 91.7 kg (202 lb 1.6 oz)     Mental Status:  oriented and alert    IV Access:  - None    Nursing Mobility/ADLs:  Walking   Independent  Transfer  Assisted  Bathing  Assisted  Dressing  Assisted  Toileting  Assisted  Feeding  Assisted  Med Admin  Assisted  Med Delivery   whole    Wound Care Documentation and Therapy:  Wound 12/10/24 Buttocks Right (Active)   Wound Image   12/21/24 1519   Dressing Status Other (Comment) 12/24/24 0958   Wound Cleansed Soap and water 12/24/24 0958   Dressing/Treatment Open to air 12/24/24 0958   Drainage Amount None (dry) 12/24/24 0958   Odor None 12/21/24 1519   Number of days: 13        Elimination:  Continence:   Bowel: Yes  Bladder: conklin  Urinary Catheter: Indication for Use of Catheter: Urology/Urologist seeing this patient or inserted indwelling catheter   Colostomy/Ileostomy/Ileal Conduit: No       Date of Last BM: 12/22/24    Intake/Output Summary (Last 24 hours) at 12/24/2024 1206  Last data filed at 12/24/2024

## 2024-12-24 NOTE — PROGRESS NOTES
Stand to Sit: Modified Independent.    No verbal cues required to control ascend/descend as pt demo good hand placement and attentiveness to cath.     FUNCTIONAL MOBILITY:  Assistive Device: Rolling Walker  Assist Level:  Modified Independent.   Distance: To and from bathroom and Within room        ADDITIONAL ACTIVITIES:  OT reviewed progression made with OT POC at this point in time and d/c recommendations of home with HH OT, verbal understanding obtained.  Pt.'s spouse reports a reacher has been obtained and grab bars have been installed to enter the home.  OT answered all questions to best of ability.          Modified [place] Scale:  Not Applicable    ASSESSMENT:  Activity Tolerance:  Patient tolerance of  treatment: Good treatment tolerance       Assessment: Assessment: Gonzales has made good progress with OT POC and has been an active participant to date.  Pt.'s self care performance varies based on pain but does have a good support system to help as needed.  Pt. To progress to home with HH services including HH OT.  Plan: D/C to home with strong support and home with HH services.    Education:  Learners: Patient and Significant Other  Progression with OT POC    Goals  Patient goals : use a walker, be as indep as possible at home.    Time Frame for Short Term Goals: 1 week  Short Term Goal 1: Pt will safely navigate environment to complete item retrieval & transport of >/= 5 items with CGA and no VC for safety to increase indep with ADLs GOAL MET  Short Term Goal 2: Pt will tolerate dynamic standing x5min with SBA and 1-2 UE release to increase indep with grooming GOAL MET AND DISCONTINUED  Short Term Goal 3: Pt will complete LB ADLs with LHAE prn and no > than Min A for increased indep with dressing GOAL MET AND DISCONTINUED  Short Term Goal 4: Pt will safely complete simple homemaking task with Min A and min VC for technique and adaptations to increase indep with heating a meal up GOAL MET AND  DISCONTINUED  Short Term Goal 5: Pt will verbalize and incorporate >/= 3 fall prevention techniques during IADLs to increase safety during daily routine GOAL NOT MET.  GOAL DISCONTINUE.    Time Frame for Long Term Goals : 2 weeks from eval  Long Term Goal 1: Pt will complete BADL routine with Mod I and adaptations prn to maintain spinal precautions to increase indep with self care GOAL NOT MET.  GOAL DISCONTINUE.  Long Term Goal 2: Pt will complete multi-step homemaking task with Supervision and adaptations prn while maintaining spinal precautions to increase indep with meal prep GOAL NOT MET.  GOAL DISCONTINUE.  Long Term Goal 3: Pt will engage in and complete community re-entry task with Supervision and no safety cues to increase indep with getting groceries GOAL NOT MET.  GOAL DISCONTINUE.  Long Term Goal 4: Pt will demo improved occupational performance as evidenced by modifed barthel index of 91/100 GOAL NOT MET.  GOAL DISCONTINUE.        Following session, patient left in safe position with all fall risk precautions in place.

## 2024-12-24 NOTE — PROGRESS NOTES
[facility]  Diagnosis List for Inpatient Rehab facility (IRF) - Patient Assessment Instrument (BARRY)    Patient Name: Gonzales Sheehan        MRN: [id number]    : 1940  (84 y.o.)  Gender: male     Primary impairment requiring rehabilitation: 8.3 Pelvic fracture      Etiologic Diagnosis that led to the condition: Multiple closed pelvic fractures with disruption of pelvic [place]     Comorbid conditions affecting rehabilitation:   Trauma by fall  Multiple pelvic/ pubic fractures with associated hemorrhage; left sacral ala, comminuted and mildly displaced. Pubic symphyseal diastasis  Pelvic hemorrhage and hematoma, complicated by anticoagulant   Lumbar and Sacral spine fractures; Nondisplaced left L5 and L4 transverse process fractures. Fracture of the superior S5 vertebral body, nondisplaced.  Elevated CK   Bradycardia  enlarged prostate  A-fib  HLD

## 2024-12-24 NOTE — PLAN OF CARE
Problem: Discharge Planning  Goal: Discharge to home or other facility with appropriate resources  12/24/2024 0817 by Freya Ortega LISW-S  Note: Patient to be discharged on Tuesday, 12/24 to home. Patient will be under the supervision of family. Family education was provided on Monday, 12/23. Patient will be receiving services through [facility]. SW provided information to Cali on 12/23 via Epic. SW spoke with Cali on this date to confirm patient's discharge. No outstanding needs.

## 2024-12-24 NOTE — PROGRESS NOTES
Patient Seen in: [facility] Emergency Department      History   Patient presents with:   Allergic Rxn Allergies    Stated Complaint: right facial swelling that started this morning    HPI/Subjective:   SUNITHA Vang Later is a 10-year-old who presents for sarah Well appearing adult in no acute distress. HEENT: Atraumatic, normocephalic. She has slight swelling of her right lower eyelid. She does not have any tenderness on palpation of this eyelid. She also does not have any swelling elsewhere.   She also does prior to discharge, that an emergency medical condition was not or was no longer present. There was no indication for further evaluation, treatment or admission on an emergency basis.   Comprehensive verbal and written discharge and follow-up instructions     Following session, patient left in safe position with all fall risk precautions in place.

## 2024-12-24 NOTE — PROGRESS NOTES
Patient discharged in stable condition as per order of attending physician to Home with Home Health Care per staff at Time: 1445 and [address] Wheelchair to car.    AVS provided by RN at time of discharge, which includes all necessary medical information pertaining to the patients current course of illness, treatment, medications, post-discharge goals of care, and treatment preferences.     Patient/ family verbalize understanding of discharge plan and are in agreement with goal/plan/treatment preferences.     Belongings including Glasses sent with patient.      Home medications sent home with patient N/A    Availability of \"My Chart\" offered to patient as a tool for updated health record.  Steps for activation discussed with patient as mentioned on AVS.

## 2024-12-24 NOTE — PROGRESS NOTES
this is partially contributing to #1.     Multiple pelvic/pubic fractures with associated hemorrhage  -management per primary, likely contributing to #1.    6.  Hematuria, onset overnight. Unknown if catheter was tugged on.   -now clear yellow after hand irrigated and one clot removed.   -CT with Lobular hyperdensity in the bladder is favored to represent prostatic  enlargement, however an intravesical hematoma is not strictly excluded.   -Multiple right renal cysts, the largest measuring approximately 5.5 cm. No  urinary tract calculus or obstruction or inflammation.    7.  UTI, mixed growth 12/18/24.    8. Lobular hyperdensity in the bladder is favored to represent prostatic  enlargement, however an intravesical hematoma is not strictly excluded. Per ct with..       Discharge with conklin catheter.  Primary to give keflex for 7 days for mixed growth on culture.   Okay to discharge from urology standpoint.     Urology will follow peripherally, please call with questions. Pt plans to follow with [facility] urology.      ROSIE Jones - JOÃO, APRN  12/24/24 8:57 AM  Urology

## 2024-12-24 NOTE — CARE COORDINATION
Wife here this shift. Very excited to be going home today. Galeana catheter in place and draining well, although it continues to have some blood in the urine. Tylenol that is scheduled takes care of his pain and ice to his Lt hip. No needs voiced this morning, Rested well.

## 2024-12-24 NOTE — PLAN OF CARE
Problem: Discharge Planning  Goal: Discharge to home or other facility with appropriate resources  12/24/2024 1345 by Amber Merino LPN  Outcome: Completed     Problem: Safety - Adult  Goal: Free from fall injury  12/24/2024 1345 by Amber Merino LPN  Outcome: Completed     Problem: ABCDS Injury Assessment  Goal: Absence of physical injury  12/24/2024 1345 by Amber Merino LPN  Outcome: Completed     Problem: Pain  Goal: Verbalizes/displays adequate comfort level or baseline comfort level  12/24/2024 1345 by Amber Merino LPN  Outcome: Completed     Problem: Skin/Tissue Integrity  Goal: Absence of new skin breakdown  Description: 1.  Monitor for areas of redness and/or skin breakdown  2.  Assess vascular access sites hourly  3.  Every 4-6 hours minimum:  Change oxygen saturation probe site  4.  Every 4-6 hours:  If on nasal continuous positive airway pressure, respiratory therapy assess nares and determine need for appliance change or resting period.  12/24/2024 1345 by Amber Merino LPN  Outcome: Completed     Problem: Chronic Conditions and Co-morbidities  Goal: Patient's chronic conditions and co-morbidity symptoms are monitored and maintained or improved  12/24/2024 1345 by Amber Merino LPN  Outcome: Completed     Problem: Nutrition Deficit:  Goal: Optimize nutritional status  12/24/2024 1345 by Amber Merino LPN  Outcome: Completed

## 2024-12-24 NOTE — PLAN OF CARE
Problem: Discharge Planning  Goal: Discharge to home or other facility with appropriate resources  12/24/2024 0207 by Sosa Gonzales, RN  Outcome: Progressing  Flowsheets (Taken 12/23/2024 2030)  Discharge to home or other facility with appropriate resources: Identify barriers to discharge with patient and caregiver     Problem: Safety - Adult  Goal: Free from fall injury  12/24/2024 0207 by Sosa Gonzales, RN  Outcome: Progressing     Problem: ABCDS Injury Assessment  Goal: Absence of physical injury  12/24/2024 0207 by Sosa Gonzales, RN  Outcome: Progressing     Problem: Pain  Goal: Verbalizes/displays adequate comfort level or baseline comfort level  12/24/2024 0207 by Sosa Gonzales, RN  Outcome: Progressing  Flowsheets (Taken 12/23/2024 1945)  Verbalizes/displays adequate comfort level or baseline comfort level: Encourage patient to monitor pain and request assistance     Problem: Skin/Tissue Integrity  Goal: Absence of new skin breakdown  Description: 1.  Monitor for areas of redness and/or skin breakdown  2.  Assess vascular access sites hourly  3.  Every 4-6 hours minimum:  Change oxygen saturation probe site  4.  Every 4-6 hours:  If on nasal continuous positive airway pressure, respiratory therapy assess nares and determine need for appliance change or resting period.  12/24/2024 0207 by Sosa Gonzales, RN  Outcome: Progressing

## 2025-01-06 ENCOUNTER — OFFICE VISIT (OUTPATIENT)
Dept: UROLOGY | Age: 85
End: 2025-01-06
Payer: MEDICARE

## 2025-01-06 ENCOUNTER — TELEPHONE (OUTPATIENT)
Dept: UROLOGY | Age: 85
End: 2025-01-06

## 2025-01-06 VITALS
DIASTOLIC BLOOD PRESSURE: 76 MMHG | BODY MASS INDEX: 25.15 KG/M2 | SYSTOLIC BLOOD PRESSURE: 120 MMHG | TEMPERATURE: 97.5 F | WEIGHT: 196 LBS | HEART RATE: 99 BPM

## 2025-01-06 DIAGNOSIS — R33.9 URINARY RETENTION: ICD-10-CM

## 2025-01-06 DIAGNOSIS — N40.1 BPH WITH OBSTRUCTION/LOWER URINARY TRACT SYMPTOMS: Primary | ICD-10-CM

## 2025-01-06 DIAGNOSIS — N13.8 BPH WITH OBSTRUCTION/LOWER URINARY TRACT SYMPTOMS: Primary | ICD-10-CM

## 2025-01-06 DIAGNOSIS — N43.0 ENCYSTED HYDROCELE: ICD-10-CM

## 2025-01-06 PROCEDURE — G8427 DOCREV CUR MEDS BY ELIG CLIN: HCPCS | Performed by: UROLOGY

## 2025-01-06 PROCEDURE — G8417 CALC BMI ABV UP PARAM F/U: HCPCS | Performed by: UROLOGY

## 2025-01-06 PROCEDURE — 1123F ACP DISCUSS/DSCN MKR DOCD: CPT | Performed by: UROLOGY

## 2025-01-06 PROCEDURE — 1111F DSCHRG MED/CURRENT MED MERGE: CPT | Performed by: UROLOGY

## 2025-01-06 PROCEDURE — 1036F TOBACCO NON-USER: CPT | Performed by: UROLOGY

## 2025-01-06 PROCEDURE — 99204 OFFICE O/P NEW MOD 45 MIN: CPT | Performed by: UROLOGY

## 2025-01-06 PROCEDURE — 1159F MED LIST DOCD IN RCRD: CPT | Performed by: UROLOGY

## 2025-01-06 RX ORDER — TAMSULOSIN HYDROCHLORIDE 0.4 MG/1
0.4 CAPSULE ORAL 2 TIMES DAILY
Qty: 180 CAPSULE | Refills: 3 | Status: SHIPPED | OUTPATIENT
Start: 2025-01-06

## 2025-01-06 NOTE — PROGRESS NOTES
HPI:          Patient is a 84 y.o. male in no acute distress.  He is alert and oriented to person, place, and time.       History  Followed with Dr. Rivas in past for elevated PSA and BPH. Jan 2005 negative biopsy (PSA 11.69),  Jan 2011 negative biopsy (PSA 10.3).   cysto showed trilobar enlargement  LUTS markedly improved with Flomax and Proscar. At last visit IPSS 6, QOL 2.   [facility]    Currently  Patient being seen here today as a new patient.  Patient was last seen in our office in 2015.  Patient was recently discharged from [facility].  He does have a Galeana catheter in place.  Patient was having nocturia prior to this fall.  He does report going up to 6 times per night.  He also has frequency during the day.  Patient does report having a bowel movement every other day.  He does use stool softeners.  Patient has been taking Flomax 2 pills daily.  He has not had any dizziness.  He is unsure whether or not he was dizzy before he fell.  Patient has had no leakage around the catheter.  He has had no gross hematuria in the catheter.  Patient's wife does report that he may have seen some gross hematuria prior to being admitted to the hospital    Past Medical History:   Diagnosis Date    AMD (age-related macular degeneration), wet (HCC)     Bradycardia     Enlarged prostate     H/O cardiovascular stress test 02/13/2018    Equivocal myocardial perfusion study. There is a small/moderate perfusion defect of mild/moderate intensity in the inferolateral and inferior regions during stress imaging which is most consistent with ischemia but may be due to artifact. Overall, thses results are most consistent with a low risk for CAD    H/O echocardiogram 02/13/2018    EF 55-60%. The left ventiruclar wall thickness is mildly incrased. Aortic valve calcification without significant stenosis or regurgirtation. Evidence of mild (grade I) diastolic dysfucntion is seen.     History of Holter monitoring 01/29/2018

## 2025-01-06 NOTE — TELEPHONE ENCOUNTER
Patient called to report he is drinking 1-2 bottles of water.  The patient reports he urinated X1 since his catheter was removed around 11:00 am this morning.  The patient states he does not feel like his bladder is distended at this time.   This nurse advised the patient that he should continue to try to urinate every 2 hours and call office or go to the ER if he is unable to urinate or has a distended bladder.  The patient verbalizes understanding of all information.

## 2025-01-06 NOTE — PROGRESS NOTES
Pt had conklin catheter placed on 12/20/2024 for Urinary retention.    Balloon deflated on catheter and catheter removed. Patient tolerated procedure well.    Pt instructed to drink plenty of fluids, try to urinate q 2 hrs, call office in 6 hrs with update of amount voided, and if not voiding will need to return to office to have conklin replaced.   Also instructed to return to office or ER if goes >6 hrs without voiding or has strong urge to void/suprapubic discomfort and cannot urinate.  Pt verbalizes understanding of plan.  Follow up 2 weeks.

## 2025-01-07 NOTE — TELEPHONE ENCOUNTER
This can be considered normal after catheter removal.  If this does not start to improve going towards end of the week he should call our office back.

## 2025-01-07 NOTE — TELEPHONE ENCOUNTER
Patient said he started urinating last night at 8pm and was going every two hours.  He filled 1 and a half of the urinals. He said now the problem is when he has to urinate, he needs to go right now.  Is that normal and will that go away?  he said he last urinated at 6:30 or 7am.

## 2025-01-10 ENCOUNTER — HOSPITAL ENCOUNTER (OUTPATIENT)
Dept: ULTRASOUND IMAGING | Age: 85
Discharge: HOME | End: 2025-01-12
Attending: UROLOGY
Payer: MEDICARE

## 2025-01-10 DIAGNOSIS — N43.0 ENCYSTED HYDROCELE: ICD-10-CM

## 2025-01-10 PROCEDURE — 93976 VASCULAR STUDY: CPT

## 2025-01-30 ENCOUNTER — PROCEDURE VISIT (OUTPATIENT)
Dept: UROLOGY | Age: 85
End: 2025-01-30
Payer: MEDICARE

## 2025-01-30 VITALS
DIASTOLIC BLOOD PRESSURE: 68 MMHG | WEIGHT: 200 LBS | SYSTOLIC BLOOD PRESSURE: 108 MMHG | TEMPERATURE: 96.9 F | BODY MASS INDEX: 25.67 KG/M2

## 2025-01-30 DIAGNOSIS — R33.9 URINARY RETENTION: ICD-10-CM

## 2025-01-30 DIAGNOSIS — N40.1 BPH WITH OBSTRUCTION/LOWER URINARY TRACT SYMPTOMS: Primary | ICD-10-CM

## 2025-01-30 DIAGNOSIS — N13.8 BPH WITH OBSTRUCTION/LOWER URINARY TRACT SYMPTOMS: Primary | ICD-10-CM

## 2025-01-30 DIAGNOSIS — N43.0 ENCYSTED HYDROCELE: ICD-10-CM

## 2025-01-30 PROCEDURE — G8417 CALC BMI ABV UP PARAM F/U: HCPCS | Performed by: UROLOGY

## 2025-01-30 PROCEDURE — 1036F TOBACCO NON-USER: CPT | Performed by: UROLOGY

## 2025-01-30 PROCEDURE — 1159F MED LIST DOCD IN RCRD: CPT | Performed by: UROLOGY

## 2025-01-30 PROCEDURE — 1123F ACP DISCUSS/DSCN MKR DOCD: CPT | Performed by: UROLOGY

## 2025-01-30 PROCEDURE — 99214 OFFICE O/P EST MOD 30 MIN: CPT | Performed by: UROLOGY

## 2025-01-30 PROCEDURE — G8427 DOCREV CUR MEDS BY ELIG CLIN: HCPCS | Performed by: UROLOGY

## 2025-01-30 PROCEDURE — 52000 CYSTOURETHROSCOPY: CPT | Performed by: UROLOGY

## 2025-01-30 NOTE — PROGRESS NOTES
During cystoscopy the following was utilized on patient with no adverse affects:    45% SODIUM CHLORIDE 500 ML BAG  Lot number: [id number]  Expiration date: 09/2025      LIDOCAINE HYDROCHLORIDE JELLY 2%   Lot number: [id number]  Expiration date: 05/2026      CYSTOSCOPE  Lot number: [id number]  Expiration date: 10/06/2027    
tablet by mouth 2 times daily 60 tablet 3    docusate sodium (COLACE, DULCOLAX) 100 MG CAPS Take 200 mg by mouth 2 times daily 60 capsule 3    atorvastatin (LIPITOR) 40 MG tablet Take 1 tablet by mouth at bedtime 90 tablet 3    ELIQUIS 5 MG TABS tablet TAKE 1 TABLET BY MOUTH TWICE DAILY 180 tablet 3    finasteride (PROSCAR) 5 MG tablet Take 1 tablet by mouth daily      Multiple Vitamins-Minerals (ICAPS AREDS 2 PO) Take by mouth       No facility-administered encounter medications on file as of 1/30/2025.      Current Outpatient Medications on File Prior to Visit   Medication Sig Dispense Refill    tamsulosin (FLOMAX) 0.4 MG capsule Take 1 capsule by mouth in the morning and at bedtime 180 capsule 3    calcium elemental (OSCAL) 500 MG TABS tablet Take 1 tablet by mouth 2 times daily (with meals) 60 tablet 3    magnesium oxide (MAG-OX) 400 (240 Mg) MG tablet Take 1 tablet by mouth 2 times daily 60 tablet 3    Vitamin D (CHOLECALCIFEROL) 25 MCG (1000 UT) TABS tablet Take 5 tablets by mouth daily 150 tablet 3    famotidine (PEPCID) 20 MG tablet Take 1 tablet by mouth 2 times daily 60 tablet 3    docusate sodium (COLACE, DULCOLAX) 100 MG CAPS Take 200 mg by mouth 2 times daily 60 capsule 3    atorvastatin (LIPITOR) 40 MG tablet Take 1 tablet by mouth at bedtime 90 tablet 3    ELIQUIS 5 MG TABS tablet TAKE 1 TABLET BY MOUTH TWICE DAILY 180 tablet 3    finasteride (PROSCAR) 5 MG tablet Take 1 tablet by mouth daily      Multiple Vitamins-Minerals (ICAPS AREDS 2 PO) Take by mouth       No current facility-administered medications on file prior to visit.     Patient has no known allergies.  Family History   Problem Relation Age of Onset    Heart Disease Mother     Liver Cancer Father     Diabetes Father     Breast Cancer Sister     Cancer Son         bladder    Breast Cancer Daughter      Social History     Tobacco Use   Smoking Status Former    Current packs/day: 0.00    Average packs/day: 2.0 packs/day for 20.0 years (40.0

## 2025-02-05 ENCOUNTER — HOSPITAL ENCOUNTER (OUTPATIENT)
Age: 85
Discharge: HOME | End: 2025-02-05
Payer: MEDICARE

## 2025-02-05 ENCOUNTER — TELEPHONE (OUTPATIENT)
Dept: UROLOGY | Age: 85
End: 2025-02-05

## 2025-02-05 DIAGNOSIS — R30.0 DYSURIA: ICD-10-CM

## 2025-02-05 DIAGNOSIS — R30.0 DYSURIA: Primary | ICD-10-CM

## 2025-02-05 PROCEDURE — 87086 URINE CULTURE/COLONY COUNT: CPT

## 2025-02-05 PROCEDURE — 87186 SC STD MICRODIL/AGAR DIL: CPT

## 2025-02-05 PROCEDURE — 87077 CULTURE AEROBIC IDENTIFY: CPT

## 2025-02-05 RX ORDER — CIPROFLOXACIN 500 MG/1
500 TABLET, FILM COATED ORAL 2 TIMES DAILY
Qty: 14 TABLET | Refills: 0 | Status: SHIPPED | OUTPATIENT
Start: 2025-02-05 | End: 2025-02-07 | Stop reason: ALTCHOICE

## 2025-02-05 NOTE — TELEPHONE ENCOUNTER
Drop off urine for culture    Increase water intake    Continue Flomax twice per day    We sent in Cipro antibiotic to start. Take this antibiotic until gone. Take this with food and eat yogurt once per day to prevent GI upset. If you develop nausea, vomiting, or fevers call the office or go to the ER. If your urinary symptoms do not improve once completing the antibiotics call our office.

## 2025-02-05 NOTE — TELEPHONE ENCOUNTER
Patient had a recent cysto.  Now he has burning with urination.  He also has an odor to his urine. He wasn't sure if that was from taking two Flomax. No fever.

## 2025-02-07 ENCOUNTER — TELEPHONE (OUTPATIENT)
Dept: UROLOGY | Age: 85
End: 2025-02-07

## 2025-02-07 LAB
MICROORGANISM SPEC CULT: ABNORMAL
MICROORGANISM SPEC CULT: ABNORMAL
SERVICE CMNT-IMP: ABNORMAL
SPECIMEN DESCRIPTION: ABNORMAL

## 2025-02-07 RX ORDER — NITROFURANTOIN 25; 75 MG/1; MG/1
100 CAPSULE ORAL 2 TIMES DAILY
Qty: 14 CAPSULE | Refills: 0 | Status: SHIPPED | OUTPATIENT
Start: 2025-02-07 | End: 2025-02-14

## 2025-02-07 NOTE — TELEPHONE ENCOUNTER
Urine culture is positive for infection.  Stop Cipro.  We sent in culture specific Macrobid. Take this antibiotic until gone. Take this with food and eat yogurt once per day to prevent GI upset. If you develop nausea, vomiting, or fevers call the office or go to the ER. If your urinary symptoms do not improve once completing the antibiotics call our office.

## 2025-02-10 NOTE — TELEPHONE ENCOUNTER
Patient called office writer advised him of provider's response and results. Patient voiced understanding. He did state he was unable to use his car at this time to  new medication because it is covered in ice and they are supposed to have their car serviced. Patient states he will call [facility] to see if they can deliver medication.

## 2025-02-11 NOTE — TELEPHONE ENCOUNTER
Writer attempted to contact patient. Left voicemail for patient to call office back to let us know if he picked up antibiotic and started this.

## 2025-04-30 ENCOUNTER — OFFICE VISIT (OUTPATIENT)
Dept: UROLOGY | Age: 85
End: 2025-04-30
Payer: MEDICARE

## 2025-04-30 VITALS
HEART RATE: 68 BPM | DIASTOLIC BLOOD PRESSURE: 70 MMHG | WEIGHT: 199 LBS | HEIGHT: 74 IN | BODY MASS INDEX: 25.54 KG/M2 | TEMPERATURE: 97.1 F | OXYGEN SATURATION: 95 % | SYSTOLIC BLOOD PRESSURE: 130 MMHG

## 2025-04-30 DIAGNOSIS — N40.1 BPH WITH OBSTRUCTION/LOWER URINARY TRACT SYMPTOMS: ICD-10-CM

## 2025-04-30 DIAGNOSIS — R33.9 INCOMPLETE BLADDER EMPTYING: Primary | ICD-10-CM

## 2025-04-30 DIAGNOSIS — N13.8 BPH WITH OBSTRUCTION/LOWER URINARY TRACT SYMPTOMS: ICD-10-CM

## 2025-04-30 PROCEDURE — 51798 US URINE CAPACITY MEASURE: CPT | Performed by: PHYSICIAN ASSISTANT

## 2025-04-30 PROCEDURE — G8417 CALC BMI ABV UP PARAM F/U: HCPCS | Performed by: PHYSICIAN ASSISTANT

## 2025-04-30 PROCEDURE — 1036F TOBACCO NON-USER: CPT | Performed by: PHYSICIAN ASSISTANT

## 2025-04-30 PROCEDURE — 99214 OFFICE O/P EST MOD 30 MIN: CPT | Performed by: PHYSICIAN ASSISTANT

## 2025-04-30 PROCEDURE — G8427 DOCREV CUR MEDS BY ELIG CLIN: HCPCS | Performed by: PHYSICIAN ASSISTANT

## 2025-04-30 PROCEDURE — PBSHW PBB SHADOW CHARGE: Performed by: PHYSICIAN ASSISTANT

## 2025-04-30 PROCEDURE — 1159F MED LIST DOCD IN RCRD: CPT | Performed by: PHYSICIAN ASSISTANT

## 2025-04-30 PROCEDURE — 1123F ACP DISCUSS/DSCN MKR DOCD: CPT | Performed by: PHYSICIAN ASSISTANT

## 2025-04-30 NOTE — PROGRESS NOTES
HPI:      Patient is a 84 y.o. male in no acute distress.  He is alert and oriented to person, place, and time.       History  Followed with Dr. Rivas in past for elevated PSA and BPH. Jan 2005 negative biopsy (PSA 11.69),  Jan 2011 negative biopsy (PSA 10.3).   cysto showed trilobar enlargement  LUTS markedly improved with Flomax and Proscar. At last visit IPSS 6, QOL 2.   [facility]      12/2024 Fall - Multiple pelvic/ pubic fractures with associated hemorrhage; left sacral ala, comminuted and mildly displaced. Pubic symphyseal diastasis  Pelvic hemorrhage and hematoma, complicated by anticoagulant   Lumbar and Sacral spine fractures; Nondisplaced left L5 and L4 transverse process fractures. Fracture of the superior S5 vertebral body, nondisplaced.   Urinary retention     1/2025 Cystoscopy for gross hematuria and LUTS/urinary -trilobar hyperplasia, no lesions    Today:  Patient is here today for follow-up BPH and history of urinary retention secondary to fall and trilobar hyperplasia.  At last visit he did have a cystoscopy showing no lesions but extensive trilobar hyperplasia.  Patient continues take Flomax twice a day and Proscar.  He states that his stream has significantly improved and his nocturia has lessened on these medications [place] performed in office today: Pt voided - 45 mL, PVR - 276 mL.  He feels though he is emptying well.  He denies any suprapubic pain.    Past Medical History:   Diagnosis Date    AMD (age-related macular degeneration), wet (HCC)     Bradycardia     Enlarged prostate     H/O cardiovascular stress test 02/13/2018    Equivocal myocardial perfusion study. There is a small/moderate perfusion defect of mild/moderate intensity in the inferolateral and inferior regions during stress imaging which is most consistent with ischemia but may be due to artifact. Overall, thses results are most consistent with a low risk for CAD    H/O echocardiogram 02/13/2018    EF 55-60%. The left

## 2025-05-08 ENCOUNTER — HOSPITAL ENCOUNTER (OUTPATIENT)
Age: 85
Discharge: HOME | End: 2025-05-08
Payer: MEDICARE

## 2025-05-08 DIAGNOSIS — I48.0 PAROXYSMAL ATRIAL FIBRILLATION (HCC): ICD-10-CM

## 2025-05-08 LAB
ALBUMIN SERPL-MCNC: 4 G/DL (ref 3.5–5.2)
ALBUMIN/GLOB SERPL: 1.3 {RATIO} (ref 1–2.5)
ALP SERPL-CCNC: 133 U/L (ref 40–129)
ALT SERPL-CCNC: 13 U/L (ref 10–50)
ANION GAP SERPL CALCULATED.3IONS-SCNC: 8 MMOL/L (ref 9–16)
AST SERPL-CCNC: 20 U/L (ref 10–50)
BILIRUB SERPL-MCNC: 0.4 MG/DL (ref 0–1.2)
BUN SERPL-MCNC: 13 MG/DL (ref 8–23)
BUN/CREAT SERPL: 13 (ref 9–20)
CALCIUM SERPL-MCNC: 9.2 MG/DL (ref 8.6–10.4)
CHLORIDE SERPL-SCNC: 104 MMOL/L (ref 98–107)
CO2 SERPL-SCNC: 26 MMOL/L (ref 20–31)
CREAT SERPL-MCNC: 1 MG/DL (ref 0.7–1.2)
ERYTHROCYTE [DISTWIDTH] IN BLOOD BY AUTOMATED COUNT: 14.6 % (ref 11.8–14.4)
GFR, ESTIMATED: 74 ML/MIN/1.73M2
GLUCOSE SERPL-MCNC: 100 MG/DL (ref 74–99)
HCT VFR BLD AUTO: 41.9 % (ref 40.7–50.3)
HGB BLD-MCNC: 13.6 G/DL (ref 13–17)
MAGNESIUM SERPL-MCNC: 1.9 MG/DL (ref 1.6–2.4)
MCH RBC QN AUTO: 29.6 PG (ref 25.2–33.5)
MCHC RBC AUTO-ENTMCNC: 32.5 G/DL (ref 28.4–34.8)
MCV RBC AUTO: 91.1 FL (ref 82.6–102.9)
NRBC BLD-RTO: 0 PER 100 WBC
PLATELET # BLD AUTO: 259 K/UL (ref 138–453)
PMV BLD AUTO: 8.4 FL (ref 8.1–13.5)
POTASSIUM SERPL-SCNC: 4.4 MMOL/L (ref 3.7–5.3)
PROT SERPL-MCNC: 6.9 G/DL (ref 6.6–8.7)
RBC # BLD AUTO: 4.6 M/UL (ref 4.21–5.77)
SODIUM SERPL-SCNC: 138 MMOL/L (ref 136–145)
WBC OTHER # BLD: 7.4 K/UL (ref 3.5–11.3)

## 2025-05-08 PROCEDURE — 83735 ASSAY OF MAGNESIUM: CPT

## 2025-05-08 PROCEDURE — 85027 COMPLETE CBC AUTOMATED: CPT

## 2025-05-08 PROCEDURE — 80053 COMPREHEN METABOLIC PANEL: CPT

## 2025-05-08 PROCEDURE — 36415 COLL VENOUS BLD VENIPUNCTURE: CPT

## 2025-07-29 PROBLEM — C44.90: Status: ACTIVE | Noted: 2025-07-29

## 2025-08-06 ENCOUNTER — OFFICE VISIT (OUTPATIENT)
Dept: UROLOGY | Age: 85
End: 2025-08-06
Payer: MEDICARE

## 2025-08-06 VITALS
BODY MASS INDEX: 24.26 KG/M2 | HEIGHT: 74 IN | TEMPERATURE: 97.3 F | DIASTOLIC BLOOD PRESSURE: 60 MMHG | WEIGHT: 189 LBS | SYSTOLIC BLOOD PRESSURE: 124 MMHG

## 2025-08-06 DIAGNOSIS — N13.8 BPH WITH OBSTRUCTION/LOWER URINARY TRACT SYMPTOMS: ICD-10-CM

## 2025-08-06 DIAGNOSIS — N40.1 BPH WITH OBSTRUCTION/LOWER URINARY TRACT SYMPTOMS: ICD-10-CM

## 2025-08-06 DIAGNOSIS — R33.9 INCOMPLETE BLADDER EMPTYING: Primary | ICD-10-CM

## 2025-08-06 DIAGNOSIS — R97.20 ELEVATED PSA, GREATER THAN OR EQUAL TO 20 NG/ML: ICD-10-CM

## 2025-08-06 PROCEDURE — G8420 CALC BMI NORM PARAMETERS: HCPCS | Performed by: PHYSICIAN ASSISTANT

## 2025-08-06 PROCEDURE — 1036F TOBACCO NON-USER: CPT | Performed by: PHYSICIAN ASSISTANT

## 2025-08-06 PROCEDURE — 1159F MED LIST DOCD IN RCRD: CPT | Performed by: PHYSICIAN ASSISTANT

## 2025-08-06 PROCEDURE — G8427 DOCREV CUR MEDS BY ELIG CLIN: HCPCS | Performed by: PHYSICIAN ASSISTANT

## 2025-08-06 PROCEDURE — 1123F ACP DISCUSS/DSCN MKR DOCD: CPT | Performed by: PHYSICIAN ASSISTANT

## 2025-08-06 PROCEDURE — PBSHW MEAS,POST-VOID RES,US,NON-IMAGING: Performed by: PHYSICIAN ASSISTANT

## 2025-08-06 PROCEDURE — 99214 OFFICE O/P EST MOD 30 MIN: CPT | Performed by: PHYSICIAN ASSISTANT

## 2025-08-06 PROCEDURE — 51798 US URINE CAPACITY MEASURE: CPT | Performed by: PHYSICIAN ASSISTANT

## 2025-08-25 ENCOUNTER — HOSPITAL ENCOUNTER (OUTPATIENT)
Age: 85
Discharge: HOME | End: 2025-08-25
Payer: MEDICARE

## 2025-08-25 ENCOUNTER — HOSPITAL ENCOUNTER (OUTPATIENT)
Dept: MRI IMAGING | Age: 85
Discharge: HOME | End: 2025-08-27
Payer: MEDICARE

## 2025-08-25 DIAGNOSIS — R97.20 ELEVATED PSA, GREATER THAN OR EQUAL TO 20 NG/ML: ICD-10-CM

## 2025-08-25 LAB
BUN SERPL-MCNC: 11 MG/DL (ref 8–23)
CREAT SERPL-MCNC: 0.9 MG/DL (ref 0.7–1.2)
GFR, ESTIMATED: 80 ML/MIN/1.73M2

## 2025-08-25 PROCEDURE — 36415 COLL VENOUS BLD VENIPUNCTURE: CPT

## 2025-08-25 PROCEDURE — 82565 ASSAY OF CREATININE: CPT

## 2025-08-25 PROCEDURE — 84520 ASSAY OF UREA NITROGEN: CPT

## 2025-08-25 PROCEDURE — 72197 MRI PELVIS W/O & W/DYE: CPT

## 2025-08-25 PROCEDURE — 6360000004 HC RX CONTRAST MEDICATION: Performed by: PHYSICIAN ASSISTANT

## 2025-08-25 PROCEDURE — A9579 GAD-BASE MR CONTRAST NOS,1ML: HCPCS | Performed by: PHYSICIAN ASSISTANT

## 2025-08-25 RX ORDER — GADOTERIDOL 279.3 MG/ML
17 INJECTION INTRAVENOUS
Status: COMPLETED | OUTPATIENT
Start: 2025-08-25 | End: 2025-08-25

## 2025-08-25 RX ADMIN — GADOTERIDOL 17 ML: 279.3 INJECTION, SOLUTION INTRAVENOUS at 10:57

## 2025-09-05 ENCOUNTER — OFFICE VISIT (OUTPATIENT)
Dept: UROLOGY | Age: 85
End: 2025-09-05
Payer: MEDICARE

## 2025-09-05 VITALS
SYSTOLIC BLOOD PRESSURE: 149 MMHG | DIASTOLIC BLOOD PRESSURE: 81 MMHG | HEIGHT: 74 IN | WEIGHT: 189 LBS | TEMPERATURE: 96.9 F | BODY MASS INDEX: 24.26 KG/M2 | HEART RATE: 56 BPM

## 2025-09-05 DIAGNOSIS — N13.8 BPH WITH OBSTRUCTION/LOWER URINARY TRACT SYMPTOMS: ICD-10-CM

## 2025-09-05 DIAGNOSIS — N40.1 BPH WITH OBSTRUCTION/LOWER URINARY TRACT SYMPTOMS: ICD-10-CM

## 2025-09-05 DIAGNOSIS — R97.20 ELEVATED PSA, GREATER THAN OR EQUAL TO 20 NG/ML: Primary | ICD-10-CM

## 2025-09-05 DIAGNOSIS — R33.9 INCOMPLETE BLADDER EMPTYING: ICD-10-CM

## 2025-09-05 PROCEDURE — 51798 US URINE CAPACITY MEASURE: CPT | Performed by: NURSE PRACTITIONER
